# Patient Record
Sex: FEMALE | Race: OTHER | URBAN - METROPOLITAN AREA
[De-identification: names, ages, dates, MRNs, and addresses within clinical notes are randomized per-mention and may not be internally consistent; named-entity substitution may affect disease eponyms.]

---

## 2023-09-12 ENCOUNTER — APPOINTMENT (OUTPATIENT)
Dept: ULTRASOUND IMAGING | Age: 83
DRG: 981 | End: 2023-09-12
Attending: INTERNAL MEDICINE
Payer: MEDICARE

## 2023-09-12 ENCOUNTER — HOSPITAL ENCOUNTER (INPATIENT)
Age: 83
LOS: 17 days | Discharge: OTHER INSTITUTION | DRG: 981 | End: 2023-09-29
Attending: INTERNAL MEDICINE | Admitting: INTERNAL MEDICINE
Payer: MEDICARE

## 2023-09-12 DIAGNOSIS — B96.20 BACTEREMIA DUE TO ESCHERICHIA COLI: ICD-10-CM

## 2023-09-12 DIAGNOSIS — K80.00 CALCULUS OF GALLBLADDER WITH ACUTE CHOLECYSTITIS WITHOUT OBSTRUCTION: ICD-10-CM

## 2023-09-12 DIAGNOSIS — R10.11 RIGHT UPPER QUADRANT ABDOMINAL PAIN: ICD-10-CM

## 2023-09-12 DIAGNOSIS — R78.81 BACTEREMIA DUE TO ESCHERICHIA COLI: ICD-10-CM

## 2023-09-12 DIAGNOSIS — I49.9 CARDIAC ARRHYTHMIA, UNSPECIFIED CARDIAC ARRHYTHMIA TYPE: Primary | ICD-10-CM

## 2023-09-12 DIAGNOSIS — Z88.1 ALLERGY TO MULTIPLE ANTIBIOTICS: ICD-10-CM

## 2023-09-12 DIAGNOSIS — K63.1 PERFORATED BOWEL (HCC): ICD-10-CM

## 2023-09-12 PROBLEM — I10 BENIGN ESSENTIAL HTN: Status: ACTIVE | Noted: 2023-09-12

## 2023-09-12 PROBLEM — K52.9 ACUTE GASTROENTERITIS: Status: ACTIVE | Noted: 2023-09-12

## 2023-09-12 PROBLEM — K83.8 DILATION OF BILIARY TRACT: Status: ACTIVE | Noted: 2023-09-12

## 2023-09-12 PROBLEM — E78.2 MIXED HYPERLIPIDEMIA: Status: ACTIVE | Noted: 2023-09-12

## 2023-09-12 PROBLEM — R79.89 ABNORMAL LFTS: Status: ACTIVE | Noted: 2023-09-12

## 2023-09-12 PROBLEM — E11.9 DIABETES MELLITUS TYPE 2 IN NONOBESE (HCC): Status: ACTIVE | Noted: 2023-09-12

## 2023-09-12 PROBLEM — R63.4 WEIGHT LOSS: Status: ACTIVE | Noted: 2023-09-12

## 2023-09-12 PROBLEM — K80.50 CHOLEDOCHOLITHIASIS: Status: ACTIVE | Noted: 2023-09-12

## 2023-09-12 LAB
A1AT SERPL-MCNC: 136 MG/DL (ref 90–200)
ABSOLUTE BANDS #: 3.02 K/UL (ref 0–1)
AFP SERPL-MCNC: 2.1 UG/L
ALBUMIN SERPL-MCNC: 3.3 G/DL (ref 3.5–5.2)
ALBUMIN SERPL-MCNC: 3.3 G/DL (ref 3.5–5.2)
ALP SERPL-CCNC: 59 U/L (ref 35–104)
ALP SERPL-CCNC: 59 U/L (ref 35–104)
ALT SERPL-CCNC: 210 U/L (ref 5–33)
ALT SERPL-CCNC: 210 U/L (ref 5–33)
ANION GAP SERPL CALCULATED.3IONS-SCNC: 6 MMOL/L (ref 9–17)
AST SERPL-CCNC: 324 U/L
AST SERPL-CCNC: 324 U/L
BANDS: 19 % (ref 0–10)
BASOPHILS # BLD: 0 K/UL (ref 0–0.2)
BASOPHILS NFR BLD: 0 % (ref 0–2)
BILIRUB DIRECT SERPL-MCNC: 1.1 MG/DL
BILIRUB INDIRECT SERPL-MCNC: 0.2 MG/DL (ref 0–1)
BILIRUB SERPL-MCNC: 1.3 MG/DL (ref 0.3–1.2)
BILIRUB SERPL-MCNC: 1.3 MG/DL (ref 0.3–1.2)
BUN SERPL-MCNC: 23 MG/DL (ref 8–23)
CALCIUM SERPL-MCNC: 9.4 MG/DL (ref 8.6–10.4)
CERULOPLASMIN SERPL-MCNC: 19 MG/DL (ref 16–45)
CHLORIDE SERPL-SCNC: 103 MMOL/L (ref 98–107)
CK SERPL-CCNC: 145 U/L (ref 26–192)
CO2 SERPL-SCNC: 21 MMOL/L (ref 20–31)
CREAT SERPL-MCNC: 1 MG/DL (ref 0.5–0.9)
EOSINOPHIL # BLD: 0 K/UL (ref 0–0.4)
EOSINOPHILS RELATIVE PERCENT: 0 % (ref 0–4)
ERYTHROCYTE [DISTWIDTH] IN BLOOD BY AUTOMATED COUNT: 12.9 % (ref 11.5–14.9)
FERRITIN SERPL-MCNC: 238 NG/ML (ref 13–150)
FOLATE SERPL-MCNC: 17.5 NG/ML
GFR SERPL CREATININE-BSD FRML MDRD: 57 ML/MIN/1.73M2
GLUCOSE SERPL-MCNC: 172 MG/DL (ref 70–99)
HAV IGM SERPL QL IA: NONREACTIVE
HBV CORE IGM SERPL QL IA: NONREACTIVE
HBV SURFACE AG SERPL QL IA: NONREACTIVE
HCT VFR BLD AUTO: 27 % (ref 36–46)
HCV AB SERPL QL IA: NONREACTIVE
HGB BLD-MCNC: 8.6 G/DL (ref 12–16)
INR PPP: 1.1
IRON SATN MFR SERPL: 5 % (ref 20–55)
IRON SERPL-MCNC: 13 UG/DL (ref 37–145)
LYMPHOCYTES NFR BLD: 0.48 K/UL (ref 1–4.8)
LYMPHOCYTES RELATIVE PERCENT: 3 % (ref 24–44)
MCH RBC QN AUTO: 29 PG (ref 26–34)
MCHC RBC AUTO-ENTMCNC: 31.9 G/DL (ref 31–37)
MCV RBC AUTO: 90.9 FL (ref 80–100)
METAMYELOCYTES ABSOLUTE COUNT: 0.95 K/UL
METAMYELOCYTES: 6 %
MONOCYTES NFR BLD: 0.64 K/UL (ref 0.1–1.3)
MONOCYTES NFR BLD: 4 % (ref 1–7)
MORPHOLOGY: ABNORMAL
MORPHOLOGY: ABNORMAL
NEUTROPHILS NFR BLD: 68 % (ref 36–66)
NEUTS SEG NFR BLD: 10.81 K/UL (ref 1.3–9.1)
PLATELET # BLD AUTO: 179 K/UL (ref 150–450)
PMV BLD AUTO: 7.8 FL (ref 6–12)
POTASSIUM SERPL-SCNC: 5.3 MMOL/L (ref 3.7–5.3)
PROT SERPL-MCNC: 5.7 G/DL (ref 6.4–8.3)
PROT SERPL-MCNC: 5.7 G/DL (ref 6.4–8.3)
PROTHROMBIN TIME: 14.1 SEC (ref 11.8–14.6)
RBC # BLD AUTO: 2.97 M/UL (ref 4–5.2)
SODIUM SERPL-SCNC: 130 MMOL/L (ref 135–144)
TIBC SERPL-MCNC: 237 UG/DL (ref 250–450)
UNSATURATED IRON BINDING CAPACITY: 224 UG/DL (ref 112–347)
VIT B12 SERPL-MCNC: 850 PG/ML (ref 232–1245)
WBC OTHER # BLD: 15.9 K/UL (ref 3.5–11)

## 2023-09-12 PROCEDURE — 86225 DNA ANTIBODY NATIVE: CPT

## 2023-09-12 PROCEDURE — 86376 MICROSOMAL ANTIBODY EACH: CPT

## 2023-09-12 PROCEDURE — 2580000003 HC RX 258: Performed by: NURSE PRACTITIONER

## 2023-09-12 PROCEDURE — 86038 ANTINUCLEAR ANTIBODIES: CPT

## 2023-09-12 PROCEDURE — 82550 ASSAY OF CK (CPK): CPT

## 2023-09-12 PROCEDURE — 6360000002 HC RX W HCPCS: Performed by: INTERNAL MEDICINE

## 2023-09-12 PROCEDURE — 82103 ALPHA-1-ANTITRYPSIN TOTAL: CPT

## 2023-09-12 PROCEDURE — 85025 COMPLETE CBC W/AUTO DIFF WBC: CPT

## 2023-09-12 PROCEDURE — 83516 IMMUNOASSAY NONANTIBODY: CPT

## 2023-09-12 PROCEDURE — 99222 1ST HOSP IP/OBS MODERATE 55: CPT | Performed by: INTERNAL MEDICINE

## 2023-09-12 PROCEDURE — 82105 ALPHA-FETOPROTEIN SERUM: CPT

## 2023-09-12 PROCEDURE — 80074 ACUTE HEPATITIS PANEL: CPT

## 2023-09-12 PROCEDURE — 36415 COLL VENOUS BLD VENIPUNCTURE: CPT

## 2023-09-12 PROCEDURE — 76705 ECHO EXAM OF ABDOMEN: CPT

## 2023-09-12 PROCEDURE — 99223 1ST HOSP IP/OBS HIGH 75: CPT | Performed by: INTERNAL MEDICINE

## 2023-09-12 PROCEDURE — 82728 ASSAY OF FERRITIN: CPT

## 2023-09-12 PROCEDURE — 80053 COMPREHEN METABOLIC PANEL: CPT

## 2023-09-12 PROCEDURE — 83540 ASSAY OF IRON: CPT

## 2023-09-12 PROCEDURE — 2580000003 HC RX 258: Performed by: INTERNAL MEDICINE

## 2023-09-12 PROCEDURE — 82607 VITAMIN B-12: CPT

## 2023-09-12 PROCEDURE — 83550 IRON BINDING TEST: CPT

## 2023-09-12 PROCEDURE — 6360000002 HC RX W HCPCS: Performed by: NURSE PRACTITIONER

## 2023-09-12 PROCEDURE — 82746 ASSAY OF FOLIC ACID SERUM: CPT

## 2023-09-12 PROCEDURE — 2500000003 HC RX 250 WO HCPCS: Performed by: NURSE PRACTITIONER

## 2023-09-12 PROCEDURE — 82390 ASSAY OF CERULOPLASMIN: CPT

## 2023-09-12 PROCEDURE — 80076 HEPATIC FUNCTION PANEL: CPT

## 2023-09-12 PROCEDURE — 85610 PROTHROMBIN TIME: CPT

## 2023-09-12 PROCEDURE — 1200000000 HC SEMI PRIVATE

## 2023-09-12 PROCEDURE — C9113 INJ PANTOPRAZOLE SODIUM, VIA: HCPCS | Performed by: NURSE PRACTITIONER

## 2023-09-12 RX ORDER — LOSARTAN POTASSIUM 50 MG/1
50 TABLET ORAL DAILY
Status: ON HOLD | COMMUNITY
End: 2023-09-28 | Stop reason: HOSPADM

## 2023-09-12 RX ORDER — POLYETHYLENE GLYCOL 3350 17 G/17G
17 POWDER, FOR SOLUTION ORAL DAILY PRN
Status: DISCONTINUED | OUTPATIENT
Start: 2023-09-12 | End: 2023-09-29 | Stop reason: HOSPADM

## 2023-09-12 RX ORDER — PRAVASTATIN SODIUM 40 MG
40 TABLET ORAL DAILY
COMMUNITY

## 2023-09-12 RX ORDER — ONDANSETRON 4 MG/1
4 TABLET, ORALLY DISINTEGRATING ORAL EVERY 8 HOURS PRN
Status: DISCONTINUED | OUTPATIENT
Start: 2023-09-12 | End: 2023-09-29 | Stop reason: HOSPADM

## 2023-09-12 RX ORDER — GABAPENTIN 300 MG/1
300 CAPSULE ORAL 2 TIMES DAILY
Status: ON HOLD | COMMUNITY
End: 2023-09-29 | Stop reason: SDUPTHER

## 2023-09-12 RX ORDER — ACETAMINOPHEN 325 MG/1
650 TABLET ORAL EVERY 6 HOURS PRN
Status: DISCONTINUED | OUTPATIENT
Start: 2023-09-12 | End: 2023-09-29 | Stop reason: HOSPADM

## 2023-09-12 RX ORDER — ACETAMINOPHEN 650 MG/1
650 SUPPOSITORY RECTAL EVERY 6 HOURS PRN
Status: DISCONTINUED | OUTPATIENT
Start: 2023-09-12 | End: 2023-09-29 | Stop reason: HOSPADM

## 2023-09-12 RX ORDER — ENOXAPARIN SODIUM 100 MG/ML
40 INJECTION SUBCUTANEOUS DAILY
Status: DISCONTINUED | OUTPATIENT
Start: 2023-09-12 | End: 2023-09-29 | Stop reason: HOSPADM

## 2023-09-12 RX ORDER — METRONIDAZOLE 500 MG/100ML
500 INJECTION, SOLUTION INTRAVENOUS EVERY 8 HOURS
Status: DISCONTINUED | OUTPATIENT
Start: 2023-09-12 | End: 2023-09-22

## 2023-09-12 RX ORDER — DEXTROSE MONOHYDRATE, SODIUM CHLORIDE, AND POTASSIUM CHLORIDE 50; 1.49; 4.5 G/1000ML; G/1000ML; G/1000ML
INJECTION, SOLUTION INTRAVENOUS CONTINUOUS
Status: DISCONTINUED | OUTPATIENT
Start: 2023-09-12 | End: 2023-09-14

## 2023-09-12 RX ORDER — MAGNESIUM SULFATE 1 G/100ML
1000 INJECTION INTRAVENOUS PRN
Status: DISCONTINUED | OUTPATIENT
Start: 2023-09-12 | End: 2023-09-29 | Stop reason: HOSPADM

## 2023-09-12 RX ORDER — FAMOTIDINE 20 MG/1
20 TABLET, FILM COATED ORAL 2 TIMES DAILY
COMMUNITY

## 2023-09-12 RX ORDER — ONDANSETRON 2 MG/ML
4 INJECTION INTRAMUSCULAR; INTRAVENOUS EVERY 6 HOURS PRN
Status: DISCONTINUED | OUTPATIENT
Start: 2023-09-12 | End: 2023-09-29 | Stop reason: HOSPADM

## 2023-09-12 RX ORDER — SODIUM CHLORIDE 0.9 % (FLUSH) 0.9 %
10 SYRINGE (ML) INJECTION PRN
Status: DISCONTINUED | OUTPATIENT
Start: 2023-09-12 | End: 2023-09-29 | Stop reason: HOSPADM

## 2023-09-12 RX ORDER — MULTIVIT WITH MINERALS/LUTEIN
250 TABLET ORAL 2 TIMES DAILY
COMMUNITY

## 2023-09-12 RX ORDER — SODIUM CHLORIDE 0.9 % (FLUSH) 0.9 %
5-40 SYRINGE (ML) INJECTION EVERY 12 HOURS SCHEDULED
Status: DISCONTINUED | OUTPATIENT
Start: 2023-09-12 | End: 2023-09-29 | Stop reason: HOSPADM

## 2023-09-12 RX ORDER — POTASSIUM CHLORIDE 20 MEQ/1
40 TABLET, EXTENDED RELEASE ORAL PRN
Status: DISCONTINUED | OUTPATIENT
Start: 2023-09-12 | End: 2023-09-29 | Stop reason: HOSPADM

## 2023-09-12 RX ORDER — POTASSIUM CHLORIDE 7.45 MG/ML
10 INJECTION INTRAVENOUS PRN
Status: DISCONTINUED | OUTPATIENT
Start: 2023-09-12 | End: 2023-09-29 | Stop reason: HOSPADM

## 2023-09-12 RX ORDER — MELOXICAM 15 MG/1
15 TABLET ORAL DAILY
Status: ON HOLD | COMMUNITY
End: 2023-09-28 | Stop reason: HOSPADM

## 2023-09-12 RX ORDER — GLUCOSAMINE SULFATE 500 MG
500 CAPSULE ORAL 3 TIMES DAILY
COMMUNITY

## 2023-09-12 RX ORDER — SODIUM CHLORIDE 9 MG/ML
INJECTION, SOLUTION INTRAVENOUS PRN
Status: DISCONTINUED | OUTPATIENT
Start: 2023-09-12 | End: 2023-09-29 | Stop reason: HOSPADM

## 2023-09-12 RX ADMIN — CEFTRIAXONE SODIUM 1000 MG: 1 INJECTION, POWDER, FOR SOLUTION INTRAMUSCULAR; INTRAVENOUS at 11:17

## 2023-09-12 RX ADMIN — ENOXAPARIN SODIUM 40 MG: 100 INJECTION SUBCUTANEOUS at 13:24

## 2023-09-12 RX ADMIN — SODIUM CHLORIDE, PRESERVATIVE FREE 10 ML: 5 INJECTION INTRAVENOUS at 19:37

## 2023-09-12 RX ADMIN — POTASSIUM CHLORIDE, DEXTROSE MONOHYDRATE AND SODIUM CHLORIDE: 150; 5; 450 INJECTION, SOLUTION INTRAVENOUS at 11:14

## 2023-09-12 RX ADMIN — SODIUM CHLORIDE, PRESERVATIVE FREE 40 MG: 5 INJECTION INTRAVENOUS at 17:59

## 2023-09-12 RX ADMIN — METRONIDAZOLE 500 MG: 500 INJECTION, SOLUTION INTRAVENOUS at 22:59

## 2023-09-12 RX ADMIN — POTASSIUM CHLORIDE, DEXTROSE MONOHYDRATE AND SODIUM CHLORIDE: 150; 5; 450 INJECTION, SOLUTION INTRAVENOUS at 06:26

## 2023-09-12 RX ADMIN — METRONIDAZOLE 500 MG: 500 INJECTION, SOLUTION INTRAVENOUS at 13:24

## 2023-09-12 NOTE — PLAN OF CARE
Problem: Discharge Planning  Goal: Discharge to home or other facility with appropriate resources  Outcome: Progressing  Flowsheets (Taken 9/12/2023 0906)  Discharge to home or other facility with appropriate resources:   Identify barriers to discharge with patient and caregiver   Arrange for needed discharge resources and transportation as appropriate   Identify discharge learning needs (meds, wound care, etc)   Refer to discharge planning if patient needs post-hospital services based on physician order or complex needs related to functional status, cognitive ability or social support system     Problem: Safety - Adult  Goal: Free from fall injury  Outcome: Progressing     Problem: Pain  Goal: Verbalizes/displays adequate comfort level or baseline comfort level  Outcome: Progressing

## 2023-09-12 NOTE — PROGRESS NOTES
Pt direct admitted from [address] [place] [address]. Assessment as charted. No distress noted. Will continue to monitor.

## 2023-09-12 NOTE — CONSULTS
PROTIME 14.1   INR 1.1       BMP  Recent Labs     09/12/23  0852   *   K 5.3      CO2 21   BUN 23   CREATININE 1.0*   GLUCOSE 172*   CALCIUM 9.4       LIVER WORK UP  Hepatitis Functional Panel:  Recent Labs     09/12/23  0852   ALKPHOS 59  59   *  210*   *  324*   PROT 5.7*  5.7*   BILITOT 1.3*  1.3*   BILIDIR 1.1*   LABALBU 3.3*  3.3*       AMYLASE/LIPASE/AMMONIA  No results for input(s): \"AMYLASE\", \"LIPASE\", \"AMMONIA\" in the last 72 hours. Acute Hepatitis Panel   No results found for: \"HEPBSAG\", \"HEPCAB\", \"HEPBIGM\", \"HEPAIGM\"    HCV Genotype   No results found for: \"HEPATITISCGENOTYPE\"    HCV Quantitative   No results found for: \"HCVQNT\"    Metabolic work up:  Ceruloplasmin  AA work up:  Alpha 1 antitrypsin   No results found for: \"A1A\"  AMA:  No results found for: \"MITOAB\"    ASMA:  No results found for: \"SMOOTHMUSCAB\"    ANCA:    COTY:  No results found for: \"COTY\"    Anti - Liver/Kidney Ab:  No results found for: \"LIVER-KIDNEYMICROSOMALAB\"    Ceruloplasmin:  No results found for: \"CERULOPLSM\"    Celiac panel:  No results found for: \"TISSTRNTIIGG\", \"TTGIGA\", \"IGA\"    Cancer Markers:  CEA:    No results for input(s): \"CEA\" in the last 72 hours. Ca 125:   No results for input(s): \"\" in the last 72 hours. Ca 19-9:     Invalid input(s): \"\"  AFP: No results for input(s): \"AFP\" in the last 72 hours. ASSESSMENT and PLAN:   Joce Garland is a 82 y.o.  female  with PMH of DM, breast cancer s/p surgery, GERD, HTN who presented to [address] [place] [address] with abdominal pain. CT w/ biliary and PD dilation  and abnormal LFTs.     # Abdominal pain (epigastric / Right upper quadrant, lipase normal)  # Abnormal CT Abdomen w/ biliary and PD dilation  # Abnormal LFTs with AST >300, ALT >100 (normal TB and Alk)    Plan:  - Obtain MRCP / MRI  - Obtain US gallbladder  - Trend daily LFTs  - Antibiotics  - Check CK level  - Obtain workup for liver disease        Thank you for allowing

## 2023-09-12 NOTE — PLAN OF CARE
Patient is being bumped for an ICU patient-triple study. Pt may eat after her ultrasound if dr allows. Please keep NPO after midnight and patient will be scanned at 9am on 9/13/2023. Any questions, please call MRI at [phone]. Thank you!

## 2023-09-12 NOTE — CARE COORDINATION
Case Management Assessment  Initial Evaluation    Date/Time of Evaluation: 9/12/2023 12:20 PM  Assessment Completed by: Ana Stephenson RN    If patient is discharged prior to next notation, then this note serves as note for discharge by case management. Patient Name: Collins Pak                   YOB: 1940  Diagnosis: Choledocholithiasis [K80.50]                   Date / Time: 9/12/2023  3:31 AM    Patient Admission Status: Inpatient   Readmission Risk (Low < 19, Mod (19-27), High > 27): No data recorded  Current PCP: No primary care provider on file. PCP verified by CM? Yes    Chart Reviewed: Yes      History Provided by: Patient  Patient Orientation: Alert and Oriented    Patient Cognition: Alert    Hospitalization in the last 30 days (Readmission):  No    If yes, Readmission Assessment in CM Navigator will be completed. Advance Directives:      Code Status: Full Code   Patient's Primary Decision Maker is: Legal Next of Kin    Primary Decision Maker: Jin,Mahesh [facility] - [phone]    Discharge Planning:    Patient lives with: Alone Type of Home: House  Primary Care Giver: Self  Patient Support Systems include: Children, Family Members, Friends/Neighbors   Current Financial resources: Medicare  Current community resources: None  Current services prior to admission: Durable Medical Equipment            Current DME: Cane            Type of Home Care services:  OT, PT, Nursing Services    ADLS  Prior functional level: Independent in ADLs/IADLs  Current functional level: Independent in ADLs/IADLs    PT AM-PAC:   /24  OT AM-PAC:   /24    Family can provide assistance at DC: No (family live out of [place]. friend, [facility], helps)  Would you like Case Management to discuss the discharge plan with any other family members/significant others, and if so, who?  No  Plans to Return to Present Housing: Yes  Other Identified Issues/Barriers to RETURNING to current housing: No  Potential not observed

## 2023-09-12 NOTE — ACP (ADVANCE CARE PLANNING)
Advance Care Planning     Advance Care Planning Activator (Inpatient)  Conversation Note      Date of ACP Conversation: 9/12/2023     Conversation Conducted with: Patient with Decision Making Capacity    ACP Activator: Nima Rivera RN      Health Care Decision Maker:     Current Designated Health Care Decision Maker:     Primary Decision Maker: Olman Joy - [phone]  Click here to complete Healthcare Decision Makers including section of the Healthcare Decision Maker Relationship (ie \"Primary\")  Today we documented Decision Maker(s). The patient will provide ACP documents. Care Preferences    Ventilation: \"If you were in your present state of health and suddenly became very ill and were unable to breathe on your own, what would your preference be about the use of a ventilator (breathing machine) if it were available to you? \"      Would the patient desire the use of ventilator (breathing machine)?: yes    \"If your health worsens and it becomes clear that your chance of recovery is unlikely, what would your preference be about the use of a ventilator (breathing machine) if it were available to you? \"     Would the patient desire the use of ventilator (breathing machine)?: No      Resuscitation  \"CPR works best to restart the heart when there is a sudden event, like a heart attack, in someone who is otherwise healthy. Unfortunately, CPR does not typically restart the heart for people who have serious health conditions or who are very sick. \"    \"In the event your heart stopped as a result of an underlying serious health condition, would you want attempts to be made to restart your heart (answer \"yes\" for attempt to resuscitate) or would you prefer a natural death (answer \"no\" for do not attempt to resuscitate)? \" yes       [] Yes   [] No   Educated Patient / Hastings Crofts regarding differences between Advance Directives and portable DNR orders.     Length of ACP Conversation in minutes:

## 2023-09-12 NOTE — PLAN OF CARE
Please have patient or POA answer all MRI Screening questions in Epic. Exam will be scheduled after form has been completed and reviewed. Pt must be NPO for approximately 6 hours. Thank you!

## 2023-09-12 NOTE — PROGRESS NOTES
09/12/23 1449   Encounter Summary   Encounter Overview/Reason  Advance Care Planning   Service Provided For: Patient   Referral/Consult From: Patient   Support System Children;Friends/neighbors   Last Encounter  09/12/23   Complexity of Encounter Moderate   Begin Time 1325   End Time  1415   Total Time Calculated 50 min   Advance Care Planning   Type Completed AD/ACP document(s);ACP conversation   Assessment/Intervention/Outcome   Assessment Calm;Coping;Peaceful   Intervention Active listening;Prayer (assurance of)/Sanderson;Sustaining Presence/Ministry of presence   Outcome Peace; Receptive;Engaged in conversation

## 2023-09-12 NOTE — ACP (ADVANCE CARE PLANNING)
Advance Care Planning     Advance Care Planning Inpatient Note  [facility] Department    Today's Date: 9/12/2023  Unit: [address] MED SURG    Received request from patient. Upon review of chart and communication with care team, patient's decision making abilities are not in question. . Patient was/were present in the room during visit. Goals of ACP Conversation:  Discuss advance care planning documents  Facilitate a discussion related to patient's goals of care as they align with the patient's values and beliefs. Completing the documents. Health Care Decision Makers:       Primary Decision Maker: Giovana Vintondale - Child - [phone]    Secondary Decision Maker: Edu Palomo - Child - [phone]    Supplemental (Other) Decision Maker: Pardeepkorygraciela Newton - [phone]  Summary:  Completed New Documents  The primary decision maker, Mariela Beasley, PT's son, iis a medical doctor in [place], [place]. Advance Care Planning Documents (Patient Wishes):  Healthcare Power of /Advance Directive Appointment of [address]   No Living Will. Assessment:  PT wants to be a FULL code and stay as much as possible, but she wants to leave all the decision to her son who is a doctor. She was very emotional as today is when her  passed away 12 (16) years ago. Interventions:  Provided education on documents for clarity and greater understanding  Discussed and provided education on state decision maker hierarchy  Assisted in the completion of documents according to patient's wishes at this time  Encouraged ongoing ACP conversation with future decision makers and loved ones    Care Preferences Communicated:     Hospitalization:  If the patient's health worsens and it becomes clear that the chance of recovery is unlikely,     the patient wants hospitalization.     Ventilation:   If the patient, in their present state of health, suddenly became very ill and unable to breathe on their own,     the

## 2023-09-12 NOTE — CONSULTS
General Surgery Consult      Pt Name: Damaso Brenner  MRN: [id number]  YOB: 1940  Date of evaluation: 9/12/2023  Primary Care Physician: No primary care provider on file. Patient evaluated at the request of  HERBIE Velasquez PA-C  Reason for evaluation: Choledocholithiasis, abdominal pain    SUBJECTIVE:   History of Chief Complaint:    Damaso Brenner is a 82 y.o. female who presents with abdominal pain. Patient was transferred from [facility] earlier today. Patient is seen and examined at bedside this afternoon. Patient states that abdominal pain started last Friday. Complaining of pain to right upper quadrant of abdomen. Patient does complain of nausea without vomiting. States that her son is a physician who recommended that she follow a fat-free diet, however pain continued. She does complain of chills, also states she had fever. Review of CT abdomen pelvis completed yesterday at [facility] emergency department reveals biliary duct dilatation with the common bile duct measuring 1.4 cm. Additional findings noted. Pertinent lab work reviewed from the emergency department at [facility] yesterday reveals potassium of 5.3,  , alkaline phosphatase 66, white blood cell count 12.2, hemoglobin 10.1. Positive for nitrates, leukocytes, bacteria per UA. Patient with significant medical history for diabetes and hypertension, as well as breast cancer. Prior abdominal/pelvic surgeries include hysterectomy. Patient states that she was taking aspirin every other day. Past Medical History   has a past medical history of Arthritis, Cancer ([address]), Chronic back pain, Diabetes mellitus ([address]), DM (diabetes mellitus) ([address]), GERD (gastroesophageal reflux disease), Hypertension, Spinal stenosis, and Spondylolysis.     Past Surgical History   has a past surgical history that includes back surgery; Breast surgery; joint replacement; and Tonsillectomy.     Medications    Current Facility-Administered Medications:     dextrose 5 % and 0.45 % NaCl with KCl 20 mEq infusion, , IntraVENous, Continuous, GRACE Arauz CNP, Last Rate: 125 mL/hr at 09/12/23 1114, New Bag at 09/12/23 1114    sodium chloride flush 0.9 % injection 5-40 mL, 5-40 mL, IntraVENous, 2 times per day, GRACE Arauz CNP    sodium chloride flush 0.9 % injection 10 mL, 10 mL, IntraVENous, PRN, GRACE Arauz - CNP    0.9 % sodium chloride infusion, , IntraVENous, PRN, GRACE Arauz CNP    potassium chloride (KLOR-CON M) extended release tablet 40 mEq, 40 mEq, Oral, PRN **OR** potassium bicarb-citric acid (EFFER-K) effervescent tablet 40 mEq, 40 mEq, Oral, PRN **OR** potassium chloride 10 mEq/100 mL IVPB (Peripheral Line), 10 mEq, IntraVENous, PRN, GRACE Arauz - CNP    magnesium sulfate 1000 mg in dextrose 5% 100 mL IVPB, 1,000 mg, IntraVENous, PRN, GRACE Arauz - CNP    enoxaparin (LOVENOX) injection 40 mg, 40 mg, SubCUTAneous, Daily, GRACE Arauz CNP, 40 mg at 09/12/23 1324    ondansetron (ZOFRAN-ODT) disintegrating tablet 4 mg, 4 mg, Oral, Q8H PRN **OR** ondansetron (ZOFRAN) injection 4 mg, 4 mg, IntraVENous, Q6H PRN, GRACE Arauz - CNP    acetaminophen (TYLENOL) tablet 650 mg, 650 mg, Oral, Q6H PRN **OR** acetaminophen (TYLENOL) suppository 650 mg, 650 mg, Rectal, Q6H PRN, GRACE Arauz CNP    polyethylene glycol (GLYCOLAX) packet 17 g, 17 g, Oral, Daily PRN, GRACE Arauz CNP    cefTRIAXone (ROCEPHIN) 1,000 mg in sodium chloride 0.9 % 50 mL IVPB (mini-bag), 1,000 mg, IntraVENous, Q24H, Radha Langston MD, Stopped at 09/12/23 1157    metronidazole (FLAGYL) 500 mg in 0.9% NaCl 100 mL IVPB premix, 500 mg, IntraVENous, Q8H, Radha Langston MD, Stopped at 09/12/23 1441    pantoprazole (PROTONIX) 40 mg in sodium chloride (PF) 0.9 % 10 mL injection, 40 mg,

## 2023-09-12 NOTE — H&P
[address] [facility] Internal Medicine  oYssi Wright MD; Jody Avery MD; Delvin Cleaning MD; MD Alessandra Kapoor MD; Julio Cesar Fabian MD    [facility] Internal Medicine   [address] [place] [address] [address]     HISTORY AND PHYSICAL EXAMINATION            Date:   9/12/2023  Patient name:  Johnny Breaux  Date of admission:  9/12/2023  3:31 AM  MRN:   [id number]  Account:  [de-identified]  YOB: 1940  PCP:    No primary care provider on file. Room:   204204301  Code Status:    Full Code    Chief Complaint:     No chief complaint on file. Abdominal pain    History Obtained From:     patient    History of Present Illness:     Johnny Breaux is a 82 y.o.  female who presents with No chief complaint on file. and is admitted to the hospital for the management of Choledocholithiasis. Past Medical History:     Past Medical History:   Diagnosis Date    Arthritis     Cancer ([facility])     Chronic back pain     Diabetes mellitus ([address] [place] [address])     DM (diabetes mellitus) ([facility])     GERD (gastroesophageal reflux disease)     Hypertension     Spinal stenosis     Spondylolysis         Past Surgical History:     Past Surgical History:   Procedure Laterality Date    BACK SURGERY      BREAST SURGERY      JOINT REPLACEMENT      TONSILLECTOMY          Medications Prior to Admission:     Prior to Admission medications    Medication Sig Start Date End Date Taking?  Authorizing Provider   Ascorbic Acid (VITAMIN C) 250 MG tablet Take 1 tablet by mouth in the morning and at bedtime   Yes Historical Provider, MD   ASPIRIN 81 PO Take 81 mg by mouth daily   Yes Historical Provider, MD   vitamin D (CHOLECALCIFEROL) 25 MCG (1000 UT) TABS tablet Take 1 tablet by mouth daily   Yes Historical Provider, MD   cyanocobalamin 1000 MCG tablet Take 1 tablet by mouth daily   Yes Historical Provider, MD   famotidine (PEPCID) 20 MG tablet Take 1 tablet by mouth 2 times daily   Yes Historical Provider, MD over  Neurologic: There are no new focal motor or sensory deficits, normal muscle tone and bulk, no abnormal sensation, normal speech, cranial nerves II through XII grossly intact  Skin: No gross lesions, rashes, bruising or bleeding on exposed skin area  Extremities: peripheral pulses palpable, no pedal edema or calf pain with palpation  Psych: normal affect    Investigations:      Laboratory Testing:  Recent Results (from the past 24 hour(s))   Hepatic Function Panel    Collection Time: 09/12/23  8:52 AM   Result Value Ref Range    Albumin 3.3 (L) 3.5 - 5.2 g/dL    Alkaline Phosphatase 59 35 - 104 U/L     (H) 5 - 33 U/L     (H) <32 U/L    Total Bilirubin 1.3 (H) 0.3 - 1.2 mg/dL    Bilirubin, Direct 1.1 (H) <0.3 mg/dL    Bilirubin, Indirect 0.2 0.0 - 1.0 mg/dL    Total Protein 5.7 (L) 6.4 - 8.3 g/dL   CBC with Auto Differential    Collection Time: 09/12/23  8:52 AM   Result Value Ref Range    WBC 15.9 (H) 3.5 - 11.0 k/uL    RBC 2.97 (L) 4.0 - 5.2 m/uL    Hemoglobin 8.6 (L) 12.0 - 16.0 g/dL    Hematocrit 27.0 (L) 36 - 46 %    MCV 90.9 80 - 100 fL    MCH 29.0 26 - 34 pg    MCHC 31.9 31 - 37 g/dL    RDW 12.9 11.5 - 14.9 %    Platelets 772 557 - 314 k/uL    MPV 7.8 6.0 - 12.0 fL    Neutrophils % PENDING %    Lymphocytes % PENDING %    Monocytes % PENDING %    Eosinophils % PENDING %    Basophils % PENDING %    Neutrophils Absolute PENDING k/uL    Lymphocytes Absolute PENDING k/uL    Monocytes Absolute PENDING k/uL    Eosinophils Absolute PENDING k/uL    Basophils Absolute PENDING 0.0 - 0.2 k/uL   Protime-INR    Collection Time: 09/12/23  8:52 AM   Result Value Ref Range    Protime 14.1 11.8 - 14.6 sec    INR 1.1    Comprehensive Metabolic Panel w/ Reflex to MG    Collection Time: 09/12/23  8:52 AM   Result Value Ref Range    Sodium PENDING mmol/L    Potassium PENDING mmol/L    Chloride PENDING mmol/L    CO2 PENDING mmol/L    Anion Gap PENDING mmol/L    Glucose PENDING mg/dL    BUN PENDING mg/dL

## 2023-09-13 ENCOUNTER — APPOINTMENT (OUTPATIENT)
Dept: MRI IMAGING | Age: 83
DRG: 981 | End: 2023-09-13
Attending: INTERNAL MEDICINE
Payer: MEDICARE

## 2023-09-13 PROBLEM — B96.20 BACTEREMIA, ESCHERICHIA COLI: Status: ACTIVE | Noted: 2023-09-13

## 2023-09-13 PROBLEM — R78.81 BACTEREMIA, ESCHERICHIA COLI: Status: ACTIVE | Noted: 2023-09-13

## 2023-09-13 LAB
ABSOLUTE BANDS #: 2.09 K/UL (ref 0–1)
ALBUMIN SERPL-MCNC: 3.2 G/DL (ref 3.5–5.2)
ALP SERPL-CCNC: 66 U/L (ref 35–104)
ALT SERPL-CCNC: 138 U/L (ref 5–33)
ANION GAP SERPL CALCULATED.3IONS-SCNC: 7 MMOL/L (ref 9–17)
AST SERPL-CCNC: 113 U/L
BANDS: 14 % (ref 0–10)
BASOPHILS # BLD: 0 K/UL (ref 0–0.2)
BASOPHILS NFR BLD: 0 % (ref 0–2)
BILIRUB SERPL-MCNC: 1.1 MG/DL (ref 0.3–1.2)
BUN SERPL-MCNC: 14 MG/DL (ref 8–23)
CALCIUM SERPL-MCNC: 9.5 MG/DL (ref 8.6–10.4)
CHLORIDE SERPL-SCNC: 104 MMOL/L (ref 98–107)
CO2 SERPL-SCNC: 20 MMOL/L (ref 20–31)
CREAT SERPL-MCNC: 0.8 MG/DL (ref 0.5–0.9)
EOSINOPHIL # BLD: 0.15 K/UL (ref 0–0.4)
EOSINOPHILS RELATIVE PERCENT: 1 % (ref 0–4)
ERYTHROCYTE [DISTWIDTH] IN BLOOD BY AUTOMATED COUNT: 12.8 % (ref 11.5–14.9)
GFR SERPL CREATININE-BSD FRML MDRD: >60 ML/MIN/1.73M2
GLUCOSE SERPL-MCNC: 162 MG/DL (ref 70–99)
HCT VFR BLD AUTO: 26.5 % (ref 36–46)
HGB BLD-MCNC: 8.5 G/DL (ref 12–16)
LIPASE SERPL-CCNC: 20 U/L (ref 13–60)
LYMPHOCYTES NFR BLD: 0.6 K/UL (ref 1–4.8)
LYMPHOCYTES RELATIVE PERCENT: 4 % (ref 24–44)
MAGNESIUM SERPL-MCNC: 2 MG/DL (ref 1.6–2.6)
MCH RBC QN AUTO: 29.3 PG (ref 26–34)
MCHC RBC AUTO-ENTMCNC: 32.2 G/DL (ref 31–37)
MCV RBC AUTO: 90.9 FL (ref 80–100)
MONOCYTES NFR BLD: 0.3 K/UL (ref 0.1–1.3)
MONOCYTES NFR BLD: 2 % (ref 1–7)
MORPHOLOGY: ABNORMAL
MORPHOLOGY: ABNORMAL
NEUTROPHILS NFR BLD: 79 % (ref 36–66)
NEUTS SEG NFR BLD: 11.76 K/UL (ref 1.3–9.1)
PHOSPHATE SERPL-MCNC: 2.2 MG/DL (ref 2.6–4.5)
PLATELET # BLD AUTO: 170 K/UL (ref 150–450)
PMV BLD AUTO: 8.2 FL (ref 6–12)
POTASSIUM SERPL-SCNC: 4.9 MMOL/L (ref 3.7–5.3)
PROT SERPL-MCNC: 5.9 G/DL (ref 6.4–8.3)
RBC # BLD AUTO: 2.92 M/UL (ref 4–5.2)
SODIUM SERPL-SCNC: 131 MMOL/L (ref 135–144)
WBC OTHER # BLD: 14.9 K/UL (ref 3.5–11)

## 2023-09-13 PROCEDURE — C9113 INJ PANTOPRAZOLE SODIUM, VIA: HCPCS | Performed by: NURSE PRACTITIONER

## 2023-09-13 PROCEDURE — APPSS30 APP SPLIT SHARED TIME 16-30 MINUTES: Performed by: NURSE PRACTITIONER

## 2023-09-13 PROCEDURE — 85025 COMPLETE CBC W/AUTO DIFF WBC: CPT

## 2023-09-13 PROCEDURE — 2580000003 HC RX 258: Performed by: NURSE PRACTITIONER

## 2023-09-13 PROCEDURE — 84100 ASSAY OF PHOSPHORUS: CPT

## 2023-09-13 PROCEDURE — 6360000002 HC RX W HCPCS: Performed by: NURSE PRACTITIONER

## 2023-09-13 PROCEDURE — 2500000003 HC RX 250 WO HCPCS: Performed by: NURSE PRACTITIONER

## 2023-09-13 PROCEDURE — 99232 SBSQ HOSP IP/OBS MODERATE 35: CPT | Performed by: INTERNAL MEDICINE

## 2023-09-13 PROCEDURE — 2580000003 HC RX 258: Performed by: INTERNAL MEDICINE

## 2023-09-13 PROCEDURE — A4216 STERILE WATER/SALINE, 10 ML: HCPCS | Performed by: NURSE PRACTITIONER

## 2023-09-13 PROCEDURE — 36415 COLL VENOUS BLD VENIPUNCTURE: CPT

## 2023-09-13 PROCEDURE — 83690 ASSAY OF LIPASE: CPT

## 2023-09-13 PROCEDURE — 83735 ASSAY OF MAGNESIUM: CPT

## 2023-09-13 PROCEDURE — 82977 ASSAY OF GGT: CPT

## 2023-09-13 PROCEDURE — 6360000002 HC RX W HCPCS: Performed by: INTERNAL MEDICINE

## 2023-09-13 PROCEDURE — 80053 COMPREHEN METABOLIC PANEL: CPT

## 2023-09-13 PROCEDURE — 1200000000 HC SEMI PRIVATE

## 2023-09-13 PROCEDURE — 76377 3D RENDER W/INTRP POSTPROCES: CPT

## 2023-09-13 RX ADMIN — METRONIDAZOLE 500 MG: 500 INJECTION, SOLUTION INTRAVENOUS at 06:09

## 2023-09-13 RX ADMIN — METRONIDAZOLE 500 MG: 500 INJECTION, SOLUTION INTRAVENOUS at 10:44

## 2023-09-13 RX ADMIN — POTASSIUM CHLORIDE, DEXTROSE MONOHYDRATE AND SODIUM CHLORIDE: 150; 5; 450 INJECTION, SOLUTION INTRAVENOUS at 13:53

## 2023-09-13 RX ADMIN — METRONIDAZOLE 500 MG: 500 INJECTION, SOLUTION INTRAVENOUS at 18:00

## 2023-09-13 RX ADMIN — ONDANSETRON 4 MG: 2 INJECTION INTRAMUSCULAR; INTRAVENOUS at 21:45

## 2023-09-13 RX ADMIN — SODIUM CHLORIDE, PRESERVATIVE FREE 40 MG: 5 INJECTION INTRAVENOUS at 10:14

## 2023-09-13 RX ADMIN — POTASSIUM CHLORIDE, DEXTROSE MONOHYDRATE AND SODIUM CHLORIDE: 150; 5; 450 INJECTION, SOLUTION INTRAVENOUS at 02:02

## 2023-09-13 RX ADMIN — ENOXAPARIN SODIUM 40 MG: 100 INJECTION SUBCUTANEOUS at 10:14

## 2023-09-13 RX ADMIN — CEFTRIAXONE SODIUM 1000 MG: 1 INJECTION, POWDER, FOR SOLUTION INTRAMUSCULAR; INTRAVENOUS at 10:11

## 2023-09-13 NOTE — CARE COORDINATION
ONGOING DISCHARGE PLAN:    Patient is alert and oriented x4. Spoke with patient regarding discharge plan and patient confirms that plan is still home with [facility]. MRCP today. Active order for IV Rocephin and IV Flagyl. Will continue to follow for additional discharge needs. If patient is discharged prior to next notation, then this note serves as note for discharge by case management.     Electronically signed by Myriam Ochoa RN on 9/13/2023 at 11:18 AM

## 2023-09-13 NOTE — PROGRESS NOTES
[address] [facility] Internal Medicine  China Aiken MD; Neha Goldberg MD; Ruba Lira MD; MD Rosenda Peterson MD; Lorena Oliver MD    [facility] Internal Medicine   [address]    Progress note            Date:   9/13/2023  Patient name:  Rajeev Cook  Date of admission:  9/12/2023  3:31 AM  MRN:   [id number]  Account:  [de-identified]  YOB: 1940  PCP:    No primary care provider on file. Room:   204204301  Code Status:    Full Code    Chief Complaint:     No chief complaint on file. Abdominal pain    History Obtained From:     patient    History of Present Illness:     Rajeev Cook is a 82 y.o. Unavailable / unknown female who presents with No chief complaint on file. and is admitted to the hospital for the management of Choledocholithiasis. Past Medical History:     Past Medical History:   Diagnosis Date    Arthritis     Cancer ([facility])     Chronic back pain     Diabetes mellitus ([address] [place] [address])     DM (diabetes mellitus) ([address] [place] [address])     GERD (gastroesophageal reflux disease)     Hypertension     Spinal stenosis     Spondylolysis         Past Surgical History:     Past Surgical History:   Procedure Laterality Date    BACK SURGERY      BREAST SURGERY      JOINT REPLACEMENT      TONSILLECTOMY          Medications Prior to Admission:     Prior to Admission medications    Medication Sig Start Date End Date Taking?  Authorizing Provider   Ascorbic Acid (VITAMIN C) 250 MG tablet Take 1 tablet by mouth in the morning and at bedtime   Yes Historical Provider, MD   ASPIRIN 81 PO Take 81 mg by mouth daily   Yes Historical Provider, MD   vitamin D (CHOLECALCIFEROL) 25 MCG (1000 UT) TABS tablet Take 1 tablet by mouth daily   Yes Historical Provider, MD   cyanocobalamin 1000 MCG tablet Take 1 tablet by mouth daily   Yes Historical Provider, MD   famotidine (PEPCID) 20 MG tablet Take 1 tablet by mouth 2 times daily   Yes Historical Provider,

## 2023-09-13 NOTE — PLAN OF CARE
Problem: Discharge Planning  Goal: Discharge to home or other facility with appropriate resources  9/13/2023 1815 by Dulce Renee RN  Outcome: Progressing  9/13/2023 0638 by Boy De La Paz RN  Outcome: Progressing     Problem: Safety - Adult  Goal: Free from fall injury  9/13/2023 1815 by Dulce Renee RN  Outcome: Progressing  9/13/2023 0638 by Boy De La Paz RN  Outcome: Progressing     Problem: Pain  Goal: Verbalizes/displays adequate comfort level or baseline comfort level  9/13/2023 1815 by Dulce Renee RN  Outcome: Progressing  9/13/2023 0638 by Boy De La Paz RN  Outcome: Progressing

## 2023-09-13 NOTE — PROGRESS NOTES
Patient was seen and examined. Resting comfortably. Afebrile vital signs are stable. N.p.o. for MRCP. Urine output is adequate. Abdomen is soft nondistended nothing acute. Extremity nontender. Blood work reviewed. Sodium is improved. Potassium creatinine is normal.  Liver function test shows normal alkaline phosphatase and total bilirubin. ALT has improved AST has improved as well. Liver function test is improved significantly. WBC count is slightly improved. Hemoglobin 8.5. Stable. Patient has positive blood cultures. She has UTI. MRCP pending. Depending on the results I will make further recommendations. Gallbladder ultrasound shows sludge. Borderline dilated common bile duct. Small right pleural effusion. If MRCP is negative then patient needs to be treated for UTI first and recover medically. Patient eventually will need elective robotic cholecystectomy once cleared. Discussed with charge nurse.

## 2023-09-14 ENCOUNTER — APPOINTMENT (OUTPATIENT)
Age: 83
DRG: 981 | End: 2023-09-14
Attending: INTERNAL MEDICINE
Payer: MEDICARE

## 2023-09-14 ENCOUNTER — ANESTHESIA EVENT (OUTPATIENT)
Dept: OPERATING ROOM | Age: 83
End: 2023-09-14
Payer: MEDICARE

## 2023-09-14 ENCOUNTER — APPOINTMENT (OUTPATIENT)
Dept: GENERAL RADIOLOGY | Age: 83
DRG: 981 | End: 2023-09-14
Attending: INTERNAL MEDICINE
Payer: MEDICARE

## 2023-09-14 LAB
ALBUMIN SERPL-MCNC: 3.2 G/DL (ref 3.5–5.2)
ALP SERPL-CCNC: 72 U/L (ref 35–104)
ALT SERPL-CCNC: 90 U/L (ref 5–33)
ANION GAP SERPL CALCULATED.3IONS-SCNC: 7 MMOL/L (ref 9–17)
AST SERPL-CCNC: 44 U/L
BASOPHILS # BLD: 0 K/UL (ref 0–0.2)
BASOPHILS NFR BLD: 0 % (ref 0–2)
BILIRUB SERPL-MCNC: 0.4 MG/DL (ref 0.3–1.2)
BNP SERPL-MCNC: 5509 PG/ML
BUN SERPL-MCNC: 12 MG/DL (ref 8–23)
CALCIUM SERPL-MCNC: 9.7 MG/DL (ref 8.6–10.4)
CHLORIDE SERPL-SCNC: 104 MMOL/L (ref 98–107)
CO2 SERPL-SCNC: 21 MMOL/L (ref 20–31)
CREAT SERPL-MCNC: 0.8 MG/DL (ref 0.5–0.9)
ECHO AO ROOT DIAM: 2.8 CM
ECHO AO ROOT INDEX: 1.78 CM/M2
ECHO AV AREA PEAK VELOCITY: 2.1 CM2
ECHO AV AREA VTI: 2.3 CM2
ECHO AV AREA/BSA PEAK VELOCITY: 1.3 CM2/M2
ECHO AV AREA/BSA VTI: 1.5 CM2/M2
ECHO AV MEAN GRADIENT: 5 MMHG
ECHO AV MEAN VELOCITY: 1 M/S
ECHO AV PEAK GRADIENT: 10 MMHG
ECHO AV PEAK VELOCITY: 1.6 M/S
ECHO AV VELOCITY RATIO: 0.69
ECHO AV VTI: 30.1 CM
ECHO BSA: 1.6 M2
ECHO EST RA PRESSURE: 3 MMHG
ECHO LA AREA 2C: 20.1 CM2
ECHO LA AREA 4C: 14.6 CM2
ECHO LA DIAMETER INDEX: 2.55 CM/M2
ECHO LA DIAMETER: 4 CM
ECHO LA MAJOR AXIS: 5.1 CM
ECHO LA MINOR AXIS: 5.3 CM
ECHO LA TO AORTIC ROOT RATIO: 1.43
ECHO LA VOL 2C: 62 ML (ref 22–52)
ECHO LA VOL 4C: 34 ML (ref 22–52)
ECHO LA VOL BP: 47 ML (ref 22–52)
ECHO LA VOL/BSA BIPLANE: 30 ML/M2 (ref 16–34)
ECHO LA VOLUME INDEX A2C: 39 ML/M2 (ref 16–34)
ECHO LA VOLUME INDEX A4C: 22 ML/M2 (ref 16–34)
ECHO LV E' LATERAL VELOCITY: 7 CM/S
ECHO LV E' SEPTAL VELOCITY: 5 CM/S
ECHO LV FRACTIONAL SHORTENING: 36 % (ref 28–44)
ECHO LV INTERNAL DIMENSION DIASTOLE INDEX: 2.68 CM/M2
ECHO LV INTERNAL DIMENSION DIASTOLIC: 4.2 CM (ref 3.9–5.3)
ECHO LV INTERNAL DIMENSION SYSTOLIC INDEX: 1.72 CM/M2
ECHO LV INTERNAL DIMENSION SYSTOLIC: 2.7 CM
ECHO LV IVSD: 1.2 CM (ref 0.6–0.9)
ECHO LV MASS 2D: 178.2 G (ref 67–162)
ECHO LV MASS INDEX 2D: 113.5 G/M2 (ref 43–95)
ECHO LV POSTERIOR WALL DIASTOLIC: 1.2 CM (ref 0.6–0.9)
ECHO LV RELATIVE WALL THICKNESS RATIO: 0.57
ECHO LVOT AREA: 3.1 CM2
ECHO LVOT AV VTI INDEX: 0.74
ECHO LVOT DIAM: 2 CM
ECHO LVOT MEAN GRADIENT: 2 MMHG
ECHO LVOT PEAK GRADIENT: 4 MMHG
ECHO LVOT PEAK VELOCITY: 1.1 M/S
ECHO LVOT STROKE VOLUME INDEX: 44.4 ML/M2
ECHO LVOT SV: 69.7 ML
ECHO LVOT VTI: 22.2 CM
ECHO MV A VELOCITY: 1.08 M/S
ECHO MV AREA VTI: 1.8 CM2
ECHO MV E DECELERATION TIME (DT): 183 MS
ECHO MV E VELOCITY: 0.93 M/S
ECHO MV E/A RATIO: 0.86
ECHO MV E/E' LATERAL: 13.29
ECHO MV E/E' RATIO (AVERAGED): 15.94
ECHO MV E/E' SEPTAL: 18.6
ECHO MV LVOT VTI INDEX: 1.73
ECHO MV MAX VELOCITY: 1.3 M/S
ECHO MV MEAN GRADIENT: 2 MMHG
ECHO MV MEAN VELOCITY: 0.7 M/S
ECHO MV PEAK GRADIENT: 7 MMHG
ECHO MV VTI: 38.5 CM
ECHO RIGHT VENTRICULAR SYSTOLIC PRESSURE (RVSP): 24 MMHG
ECHO RV TAPSE: 1.8 CM (ref 1.7–?)
ECHO TV REGURGITANT MAX VELOCITY: 2.29 M/S
ECHO TV REGURGITANT PEAK GRADIENT: 21 MMHG
EOSINOPHIL # BLD: 0.4 K/UL (ref 0–0.4)
EOSINOPHILS RELATIVE PERCENT: 4 % (ref 0–4)
ERYTHROCYTE [DISTWIDTH] IN BLOOD BY AUTOMATED COUNT: 13.1 % (ref 11.5–14.9)
GFR SERPL CREATININE-BSD FRML MDRD: >60 ML/MIN/1.73M2
GGT SERPL-CCNC: 217 U/L (ref 5–36)
GLUCOSE SERPL-MCNC: 113 MG/DL (ref 70–99)
HCT VFR BLD AUTO: 28.3 % (ref 36–46)
HGB BLD-MCNC: 9.3 G/DL (ref 12–16)
LKM AB TITR SER IF: NORMAL {TITER}
LYMPHOCYTES NFR BLD: 0.6 K/UL (ref 1–4.8)
LYMPHOCYTES RELATIVE PERCENT: 6 % (ref 24–44)
MCH RBC QN AUTO: 29.7 PG (ref 26–34)
MCHC RBC AUTO-ENTMCNC: 32.8 G/DL (ref 31–37)
MCV RBC AUTO: 90.7 FL (ref 80–100)
MONOCYTES NFR BLD: 0.7 K/UL (ref 0.1–1.3)
MONOCYTES NFR BLD: 6 % (ref 1–7)
NEUTROPHILS NFR BLD: 84 % (ref 36–66)
NEUTS SEG NFR BLD: 8.8 K/UL (ref 1.3–9.1)
PLATELET # BLD AUTO: 200 K/UL (ref 150–450)
PMV BLD AUTO: 8.4 FL (ref 6–12)
POTASSIUM SERPL-SCNC: 4.4 MMOL/L (ref 3.7–5.3)
PROT SERPL-MCNC: 6.1 G/DL (ref 6.4–8.3)
RBC # BLD AUTO: 3.12 M/UL (ref 4–5.2)
SMOOTH MUSCLE ANTIBODY: 16 UNITS (ref 0–19)
SODIUM SERPL-SCNC: 132 MMOL/L (ref 135–144)
TROPONIN I SERPL HS-MCNC: 28 NG/L (ref 0–14)
TROPONIN I SERPL HS-MCNC: 28 NG/L (ref 0–14)
WBC OTHER # BLD: 10.5 K/UL (ref 3.5–11)

## 2023-09-14 PROCEDURE — C9113 INJ PANTOPRAZOLE SODIUM, VIA: HCPCS | Performed by: NURSE PRACTITIONER

## 2023-09-14 PROCEDURE — APPSS30 APP SPLIT SHARED TIME 16-30 MINUTES: Performed by: NURSE PRACTITIONER

## 2023-09-14 PROCEDURE — 6370000000 HC RX 637 (ALT 250 FOR IP): Performed by: INTERNAL MEDICINE

## 2023-09-14 PROCEDURE — 1200000000 HC SEMI PRIVATE

## 2023-09-14 PROCEDURE — 6360000002 HC RX W HCPCS: Performed by: INTERNAL MEDICINE

## 2023-09-14 PROCEDURE — 2580000003 HC RX 258: Performed by: NURSE PRACTITIONER

## 2023-09-14 PROCEDURE — 2500000003 HC RX 250 WO HCPCS: Performed by: INTERNAL MEDICINE

## 2023-09-14 PROCEDURE — 85025 COMPLETE CBC W/AUTO DIFF WBC: CPT

## 2023-09-14 PROCEDURE — 93306 TTE W/DOPPLER COMPLETE: CPT

## 2023-09-14 PROCEDURE — 99223 1ST HOSP IP/OBS HIGH 75: CPT | Performed by: INTERNAL MEDICINE

## 2023-09-14 PROCEDURE — 6360000002 HC RX W HCPCS: Performed by: NURSE PRACTITIONER

## 2023-09-14 PROCEDURE — A4216 STERILE WATER/SALINE, 10 ML: HCPCS | Performed by: NURSE PRACTITIONER

## 2023-09-14 PROCEDURE — 36415 COLL VENOUS BLD VENIPUNCTURE: CPT

## 2023-09-14 PROCEDURE — 84484 ASSAY OF TROPONIN QUANT: CPT

## 2023-09-14 PROCEDURE — 80053 COMPREHEN METABOLIC PANEL: CPT

## 2023-09-14 PROCEDURE — 71045 X-RAY EXAM CHEST 1 VIEW: CPT

## 2023-09-14 PROCEDURE — 99232 SBSQ HOSP IP/OBS MODERATE 35: CPT | Performed by: INTERNAL MEDICINE

## 2023-09-14 PROCEDURE — 93005 ELECTROCARDIOGRAM TRACING: CPT

## 2023-09-14 PROCEDURE — 93306 TTE W/DOPPLER COMPLETE: CPT | Performed by: INTERNAL MEDICINE

## 2023-09-14 PROCEDURE — 2580000003 HC RX 258: Performed by: INTERNAL MEDICINE

## 2023-09-14 PROCEDURE — 99233 SBSQ HOSP IP/OBS HIGH 50: CPT | Performed by: INTERNAL MEDICINE

## 2023-09-14 PROCEDURE — 83880 ASSAY OF NATRIURETIC PEPTIDE: CPT

## 2023-09-14 RX ORDER — GABAPENTIN 300 MG/1
300 CAPSULE ORAL 2 TIMES DAILY
Status: DISCONTINUED | OUTPATIENT
Start: 2023-09-14 | End: 2023-09-29 | Stop reason: HOSPADM

## 2023-09-14 RX ORDER — LOSARTAN POTASSIUM 50 MG/1
50 TABLET ORAL DAILY
Status: DISCONTINUED | OUTPATIENT
Start: 2023-09-14 | End: 2023-09-24

## 2023-09-14 RX ORDER — PRAVASTATIN SODIUM 40 MG
40 TABLET ORAL DAILY
Status: DISCONTINUED | OUTPATIENT
Start: 2023-09-14 | End: 2023-09-29 | Stop reason: HOSPADM

## 2023-09-14 RX ORDER — ASPIRIN 81 MG/1
81 TABLET ORAL DAILY
Status: DISCONTINUED | OUTPATIENT
Start: 2023-09-14 | End: 2023-09-29 | Stop reason: HOSPADM

## 2023-09-14 RX ADMIN — METRONIDAZOLE 500 MG: 500 INJECTION, SOLUTION INTRAVENOUS at 10:35

## 2023-09-14 RX ADMIN — CEFTRIAXONE SODIUM 1000 MG: 1 INJECTION, POWDER, FOR SOLUTION INTRAMUSCULAR; INTRAVENOUS at 12:18

## 2023-09-14 RX ADMIN — PRAVASTATIN SODIUM 40 MG: 40 TABLET ORAL at 10:33

## 2023-09-14 RX ADMIN — ENOXAPARIN SODIUM 40 MG: 100 INJECTION SUBCUTANEOUS at 10:33

## 2023-09-14 RX ADMIN — METRONIDAZOLE 500 MG: 500 INJECTION, SOLUTION INTRAVENOUS at 02:44

## 2023-09-14 RX ADMIN — SODIUM CHLORIDE, PRESERVATIVE FREE 5 ML: 5 INJECTION INTRAVENOUS at 21:31

## 2023-09-14 RX ADMIN — SODIUM CHLORIDE, PRESERVATIVE FREE 40 MG: 5 INJECTION INTRAVENOUS at 10:34

## 2023-09-14 RX ADMIN — METRONIDAZOLE 500 MG: 500 INJECTION, SOLUTION INTRAVENOUS at 17:48

## 2023-09-14 RX ADMIN — LOSARTAN POTASSIUM 50 MG: 50 TABLET, FILM COATED ORAL at 10:33

## 2023-09-14 RX ADMIN — GABAPENTIN 300 MG: 300 CAPSULE ORAL at 21:28

## 2023-09-14 RX ADMIN — POTASSIUM CHLORIDE, DEXTROSE MONOHYDRATE AND SODIUM CHLORIDE: 150; 5; 450 INJECTION, SOLUTION INTRAVENOUS at 04:14

## 2023-09-14 RX ADMIN — GABAPENTIN 300 MG: 300 CAPSULE ORAL at 10:33

## 2023-09-14 RX ADMIN — SODIUM CHLORIDE, PRESERVATIVE FREE 20 ML: 5 INJECTION INTRAVENOUS at 10:35

## 2023-09-14 RX ADMIN — ASPIRIN 81 MG: 81 TABLET, COATED ORAL at 10:33

## 2023-09-14 NOTE — PLAN OF CARE
Problem: Discharge Planning  Goal: Discharge to home or other facility with appropriate resources  9/14/2023 1622 by Erica Gonzales RN  Outcome: Progressing  Flowsheets (Taken 9/14/2023 1622)  Discharge to home or other facility with appropriate resources:   Identify barriers to discharge with patient and caregiver   Arrange for needed discharge resources and transportation as appropriate     Problem: Safety - Adult  Goal: Free from fall injury  9/14/2023 1622 by Erica Gonzales RN  Outcome: Progressing  Flowsheets (Taken 9/14/2023 1622)  Free From Fall Injury: Instruct family/caregiver on patient safety     Problem: Pain  Goal: Verbalizes/displays adequate comfort level or baseline comfort level  9/14/2023 1622 by Erica Gonzales RN  Outcome: Progressing     Problem: Chronic Conditions and Co-morbidities  Goal: Patient's chronic conditions and co-morbidity symptoms are monitored and maintained or improved  Outcome: Progressing

## 2023-09-14 NOTE — CONSULTS
Date:   9/14/2023  Patient name: Chasity Arreola  Date of admission:  9/12/2023  3:31 AM  MRN:   [id number]  YOB: 1940  PCP: No primary care provider on file. Reason for Admission: Choledocholithiasis [K80.50]    Cardiology consult       Referring physician: Dr Ryann Gutierrez  Admission with abdominal pain, fever and chills abnormal ALT AST, mildly dilated CBD  E. coli bacteremia, likely biliary source  Elevated proBNP, borderline abnormal high-sensitivity troponin  Anemia, severe iron deficiency  Hypertension  Hyperlipidemia  Chronic back pain  Severe iron deficiency with anemia  History of breast cancer status post surgery    Investigation work-up    ECG 9/14/2023 -12:30 PM  Rhythm, no ischemic ECG changes      MRI abdomen without contrast  Mild prominence of the common bile duct without intrahepatic biliary dilatation or convincing evidence for choledocholithiasis  Gallbladder ultrasound 9/12/2023  Borderline dilated common bile duct small right pleural effusion      2D echo 9/14/2023  Normal LV size mild LVH normal wall motion ejection fraction more than 55%  Grade 1 LV diastolic dysfunction  No significant valvular abnormality noted  No pericardial effusion  Normal IVC diameter and respiratory variation      History of present illness    82years old female with a past medical history of pretension, hyperlipidemia, breast cancer status postsurgery, gastroesophageal reflux disease sleep presented to [facility] with abdominal pain. Her symptoms started about a week prior to the admission. She felt better after avoiding fatty food. She had a relapse of symptoms again a day before admission that prompted ER visit. CT abdomen showed 1.4 cm CBD dilation that prompted her transport to [facility].  She has elevated AST and ALT however alkaline phosphatase and bilirubin normal.  Patient also reported having chills and fever with a temperature of 101-102.   Further history examination she has been experiencing tiredness and shortness of breath for many weeks. Has no previous history of coronary intervention. Early this morning she had lower chest and upper abdominal discomfort like a very heavy pressure which lasted for about half an hour. No nausea no diaphoresis no palpitation.     Lab work on admission  Sodium 130, potassium 5.3, BUN 23, creatinine 1.0, glucose 172  Albumin 3.3, alkaline phosphatase 59, , , bilirubin 1.3  WBC 15.9, hemoglobin 8.6, MCV 90.9, neutrophils 68, platelets 998  Ferritin 238, iron 13, saturation 5, TIBC 237, B12 850, INR 1.1    Current evaluation    Patient seen and examined  Elderly tired looking female  She was resting on bed in upright position  At present she does not have abdominal discomfort chest pain  ECG monitor sinus rhythm  No peripheral edema veins were normal    Today's lab work showed proBNP 5509, sensitivity troponin 28  Sodium 132, potassium 4.4, glucose 113, albumin 3.2, AST 44, ALT 90, bilirubin 0.4  Hemoglobin 9.3, WBC 10.5, platelets 332    Medications:   Scheduled Meds:   aspirin  81 mg Oral Daily    gabapentin  300 mg Oral BID    losartan  50 mg Oral Daily    pravastatin  40 mg Oral Daily    sodium chloride flush  5-40 mL IntraVENous 2 times per day    enoxaparin  40 mg SubCUTAneous Daily    cefTRIAXone (ROCEPHIN) IV  1,000 mg IntraVENous Q24H    metroNIDAZOLE  500 mg IntraVENous Q8H    pantoprazole (PROTONIX) 40 mg in sodium chloride (PF) 0.9 % 10 mL injection  40 mg IntraVENous Daily     Continuous Infusions:   sodium chloride       CBC:   Recent Labs     09/12/23  0852 09/13/23  0549 09/14/23  0531   WBC 15.9* 14.9* 10.5   HGB 8.6* 8.5* 9.3*    170 200     BMP:    Recent Labs     09/12/23  0852 09/13/23  0549 09/14/23  0531   * 131* 132*   K 5.3 4.9 4.4    104 104   CO2 21 20 21   BUN 23 14 12   CREATININE 1.0* 0.8 0.8   GLUCOSE 172* 162* 113*     Hepatic:   Recent Labs     09/12/23  9748

## 2023-09-14 NOTE — CARE COORDINATION
ONGOING DISCHARGE PLAN:    Patient is alert and oriented x4. Spoke with patient regarding discharge plan and patient confirms that plan is possibly rehab facility with IV antibiotics. Patient from home alone and does not have anyone to assist with IV atb. Family lives in [facility]. +BC bacteremia, IV rocephin daily/IV flagyl every 8 hrs, WBC 10.5 improved  Will need 2 weeks antibiotics per notes    MRCP negative    General surgery and GI following    New cardio consult-tachycardia, chest pressure    PT/OT ordered                        Post Acute Facility/Agency List     Provided patient with the following list, the list includes the overall star ratings obtained from CMS per the Medicare Web site (www.Medicare.gov):     [] [address]  [] Acute Inpatient Rehabilitation Facilities  [x] Skilled Nursing Facilities  [] Home Care    Provided verbal instructions on how to utilize the QR Code to obtain additional detailed star ratings from www. Medicare. gov     offered to print and provide the detailed list:    [x]Accepted   []Declined         Will continue to follow for additional discharge needs. If patient is discharged prior to next notation, then this note serves as note for discharge by case management.     Electronically signed by Vik Lopez RN on 9/14/2023 at 9:36 AM

## 2023-09-14 NOTE — PROGRESS NOTES
[employer] lab called for blood culture sensitivities. Results to be faxed. 1512: Dr. Romeo Cm notified of sensitivities, no new orders received at this time.

## 2023-09-14 NOTE — PROGRESS NOTES
[facility] Internal Medicine  Yossi Wright MD; Jody Avery MD; Delvin Cleaning MD; MD Alessandra Kapoor MD; Julio Cesar Fabian MD    [facility] Internal Medicine   [address]    Progress note            Date:   9/14/2023  Patient name:  Johnny Breaux  Date of admission:  9/12/2023  3:31 AM  MRN:   [id number]  Account:  [de-identified]  YOB: 1940  PCP:    No primary care provider on file. Room:   [facility]  Code Status:    Full Code    Chief Complaint:     No chief complaint on file. Abdominal pain    History Obtained From:     patient    History of Present Illness:     Johnny Breaux is a 82 y.o. Unavailable / unknown female who presents with No chief complaint on file. and is admitted to the hospital for the management of Choledocholithiasis. Past Medical History:     Past Medical History:   Diagnosis Date    Arthritis     Cancer ([address] [facility])     Chronic back pain     Diabetes mellitus ([address] [place] [address])     DM (diabetes mellitus) ([address] [place] [address])     GERD (gastroesophageal reflux disease)     Hypertension     Spinal stenosis     Spondylolysis         Past Surgical History:     Past Surgical History:   Procedure Laterality Date    BACK SURGERY      BREAST SURGERY      JOINT REPLACEMENT      TONSILLECTOMY          Medications Prior to Admission:     Prior to Admission medications    Medication Sig Start Date End Date Taking?  Authorizing Provider   Ascorbic Acid (VITAMIN C) 250 MG tablet Take 1 tablet by mouth in the morning and at bedtime   Yes Historical Provider, MD   ASPIRIN 81 PO Take 81 mg by mouth daily   Yes Historical Provider, MD   vitamin D (CHOLECALCIFEROL) 25 MCG (1000 UT) TABS tablet Take 1 tablet by mouth daily   Yes Historical Provider, MD   cyanocobalamin 1000 MCG tablet Take 1 tablet by mouth daily   Yes Historical Provider, MD   famotidine (PEPCID) 20 MG tablet Take 1 tablet by mouth 2 times daily   Yes Historical Provider, Ao Root Index 1.78 cm/m2    AV Velocity Ratio 0.69     LVOT:AV VTI Index 0.74     SACHIN/BSA VTI 1.5 cm2/m2    SACHIN/BSA Peak Velocity 1.3 cm2/m2    MV:LVOT VTI Index 1.73     Est. RA Pressure 3 mmHg    RVSP 24 mmHg       Imaging/Diagnostics:  No results found. Assessment :      Hospital Problems             Last Modified POA    * (Principal) Choledocholithiasis 9/12/2023 Yes    Benign essential HTN 9/12/2023 Yes    Diabetes mellitus type 2 in nonobese [facility]) 9/12/2023 Yes    Mixed hyperlipidemia 9/12/2023 Yes    Acute gastroenteritis 9/12/2023 Yes    Dilation of biliary tract 9/12/2023 Yes    Abnormal LFTs 9/12/2023 Yes    Right upper quadrant abdominal pain 9/12/2023 Yes    Weight loss 9/12/2023 Yes    Bacteremia, escherichia coli 9/13/2023 Yes     Plan:     Patient status inpatient in the Med/Surge    3  31-year-old female admitted with acute abdominal pain possibly secondary to choledocholithiasis, 1.4 cm CBD dilation, MRCP ordered, discussed with gastroenterology, general surgery to see the patient,  Gastroenteritis, continue Rocephin and Flagyl,  Diabetes mellitus, continue to monitor sugars, check A1c,  Hypertension, continue home medications,  Hyperlipidemia, will hold statins at this time  DVT prophylaxis with Lovenox,  Full CODE STATUS    2.  Disposition 2d    September 14,  Seen and examined face-to-face,   abdominal pain little better,  Had MRCP done, seems to be negative,  Ultrasound no obvious evidence of cholecystitis,  Gastroenteritis, inflammation around pylorus,   E. coli bacteremia, on Rocephin and Flagyl at this time, infectious disease consulted  general surgery not planning to do any surgery at this time,   Iron deficiency anemia, with iron saturations of 5, stool for occult blood ordered, no recent EGD or colonoscopy, gastroenterology on board  possible discharge on 2 weeks of abx,  Chest pain and supraventricular tachycardia episode this morning, stat EKG, looks normal at this time, troponin

## 2023-09-14 NOTE — CARE COORDINATION
ONGOING DISCHARGE PLAN:    RN spoke with Dr Winston Smith he stated the patient will discharge on oral antibiotics. No need for SNF at this time. Patient updated.    Electronically signed by Mee Underwood RN on 9/14/2023 at 10:07 AM

## 2023-09-14 NOTE — PROGRESS NOTES
Patient tachycardic with hr in 150s. Nurse went to check patient, patient states that she has some chest discomfort and rates it a 3/10. Nurse updated attending NP, Monica Alaniz. 160.02

## 2023-09-14 NOTE — PLAN OF CARE
Problem: Discharge Planning  Goal: Discharge to home or other facility with appropriate resources  9/14/2023 0554 by Angel Bower RN  Outcome: Progressing     Problem: Pain  Goal: Verbalizes/displays adequate comfort level or baseline comfort level  9/14/2023 0554 by Angel Bower RN  Outcome: Progressing     Problem: Safety - Adult  Goal: Free from fall injury  9/14/2023 0554 by Angel Bower RN  Outcome: Adequate for Discharge  Note: Patient up with standby assist, bed alarm on.

## 2023-09-15 ENCOUNTER — APPOINTMENT (OUTPATIENT)
Dept: CT IMAGING | Age: 83
DRG: 981 | End: 2023-09-15
Attending: INTERNAL MEDICINE
Payer: MEDICARE

## 2023-09-15 ENCOUNTER — APPOINTMENT (OUTPATIENT)
Dept: GENERAL RADIOLOGY | Age: 83
DRG: 981 | End: 2023-09-15
Attending: INTERNAL MEDICINE
Payer: MEDICARE

## 2023-09-15 ENCOUNTER — APPOINTMENT (OUTPATIENT)
Dept: INTERVENTIONAL RADIOLOGY/VASCULAR | Age: 83
DRG: 981 | End: 2023-09-15
Attending: INTERNAL MEDICINE
Payer: MEDICARE

## 2023-09-15 ENCOUNTER — ANESTHESIA (OUTPATIENT)
Dept: OPERATING ROOM | Age: 83
End: 2023-09-15
Payer: MEDICARE

## 2023-09-15 ENCOUNTER — ANESTHESIA EVENT (OUTPATIENT)
Dept: OPERATING ROOM | Age: 83
End: 2023-09-15
Payer: MEDICARE

## 2023-09-15 LAB
ALBUMIN SERPL-MCNC: 3.4 G/DL (ref 3.5–5.2)
ALP SERPL-CCNC: 81 U/L (ref 35–104)
ALT SERPL-CCNC: 60 U/L (ref 5–33)
ANION GAP SERPL CALCULATED.3IONS-SCNC: 9 MMOL/L (ref 9–17)
AST SERPL-CCNC: 22 U/L
BASOPHILS # BLD: 0 K/UL (ref 0–0.2)
BASOPHILS NFR BLD: 1 % (ref 0–2)
BILIRUB DIRECT SERPL-MCNC: 0.2 MG/DL
BILIRUB INDIRECT SERPL-MCNC: 0.2 MG/DL (ref 0–1)
BILIRUB SERPL-MCNC: 0.4 MG/DL (ref 0.3–1.2)
BILIRUB UR QL STRIP: NEGATIVE
BUN SERPL-MCNC: 16 MG/DL (ref 8–23)
CALCIUM SERPL-MCNC: 10.3 MG/DL (ref 8.6–10.4)
CHLORIDE SERPL-SCNC: 102 MMOL/L (ref 98–107)
CLARITY UR: CLEAR
CO2 SERPL-SCNC: 22 MMOL/L (ref 20–31)
COLOR UR: YELLOW
COMMENT: NORMAL
CREAT SERPL-MCNC: 0.9 MG/DL (ref 0.5–0.9)
EOSINOPHIL # BLD: 0.5 K/UL (ref 0–0.4)
EOSINOPHILS RELATIVE PERCENT: 7 % (ref 0–4)
ERYTHROCYTE [DISTWIDTH] IN BLOOD BY AUTOMATED COUNT: 13.4 % (ref 11.5–14.9)
GFR SERPL CREATININE-BSD FRML MDRD: >60 ML/MIN/1.73M2
GLUCOSE BLD-MCNC: 115 MG/DL (ref 65–105)
GLUCOSE BLD-MCNC: 90 MG/DL (ref 65–105)
GLUCOSE SERPL-MCNC: 102 MG/DL (ref 70–99)
GLUCOSE UR STRIP-MCNC: NEGATIVE MG/DL
HCT VFR BLD AUTO: 31.9 % (ref 36–46)
HGB BLD-MCNC: 10.4 G/DL (ref 12–16)
HGB UR QL STRIP.AUTO: NEGATIVE
KETONES UR STRIP-MCNC: NEGATIVE MG/DL
LEUKOCYTE ESTERASE UR QL STRIP: NEGATIVE
LYMPHOCYTES NFR BLD: 1 K/UL (ref 1–4.8)
LYMPHOCYTES RELATIVE PERCENT: 15 % (ref 24–44)
MCH RBC QN AUTO: 29.6 PG (ref 26–34)
MCHC RBC AUTO-ENTMCNC: 32.5 G/DL (ref 31–37)
MCV RBC AUTO: 90.8 FL (ref 80–100)
MONOCYTES NFR BLD: 0.8 K/UL (ref 0.1–1.3)
MONOCYTES NFR BLD: 12 % (ref 1–7)
NEUTROPHILS NFR BLD: 65 % (ref 36–66)
NEUTS SEG NFR BLD: 4.6 K/UL (ref 1.3–9.1)
NITRITE UR QL STRIP: NEGATIVE
PH UR STRIP: 5.5 [PH] (ref 5–8)
PLATELET # BLD AUTO: 245 K/UL (ref 150–450)
PMV BLD AUTO: 8.2 FL (ref 6–12)
POTASSIUM SERPL-SCNC: 4.6 MMOL/L (ref 3.7–5.3)
PROT SERPL-MCNC: 6.5 G/DL (ref 6.4–8.3)
PROT UR STRIP-MCNC: NEGATIVE MG/DL
RBC # BLD AUTO: 3.52 M/UL (ref 4–5.2)
SODIUM SERPL-SCNC: 133 MMOL/L (ref 135–144)
SP GR UR STRIP: 1.03 (ref 1–1.03)
UROBILINOGEN UR STRIP-ACNC: NORMAL EU/DL (ref 0–1)
WBC OTHER # BLD: 6.9 K/UL (ref 3.5–11)

## 2023-09-15 PROCEDURE — 2580000003 HC RX 258: Performed by: INTERNAL MEDICINE

## 2023-09-15 PROCEDURE — 3700000000 HC ANESTHESIA ATTENDED CARE: Performed by: SURGERY

## 2023-09-15 PROCEDURE — 2709999900 HC NON-CHARGEABLE SUPPLY: Performed by: SURGERY

## 2023-09-15 PROCEDURE — 2500000003 HC RX 250 WO HCPCS: Performed by: NURSE ANESTHETIST, CERTIFIED REGISTERED

## 2023-09-15 PROCEDURE — 2580000003 HC RX 258: Performed by: SURGERY

## 2023-09-15 PROCEDURE — 6370000000 HC RX 637 (ALT 250 FOR IP): Performed by: INTERNAL MEDICINE

## 2023-09-15 PROCEDURE — 6360000002 HC RX W HCPCS: Performed by: NURSE PRACTITIONER

## 2023-09-15 PROCEDURE — 2580000003 HC RX 258

## 2023-09-15 PROCEDURE — 2709999900 HC NON-CHARGEABLE SUPPLY: Performed by: INTERNAL MEDICINE

## 2023-09-15 PROCEDURE — 3700000000 HC ANESTHESIA ATTENDED CARE: Performed by: INTERNAL MEDICINE

## 2023-09-15 PROCEDURE — 0DQ98ZZ REPAIR DUODENUM, VIA NATURAL OR ARTIFICIAL OPENING ENDOSCOPIC: ICD-10-PCS | Performed by: SURGERY

## 2023-09-15 PROCEDURE — 6360000002 HC RX W HCPCS: Performed by: INTERNAL MEDICINE

## 2023-09-15 PROCEDURE — 6360000002 HC RX W HCPCS: Performed by: ANESTHESIOLOGY

## 2023-09-15 PROCEDURE — 43235 EGD DIAGNOSTIC BRUSH WASH: CPT | Performed by: SURGERY

## 2023-09-15 PROCEDURE — 2580000003 HC RX 258: Performed by: NURSE PRACTITIONER

## 2023-09-15 PROCEDURE — 3600000012 HC SURGERY LEVEL 2 ADDTL 15MIN: Performed by: SURGERY

## 2023-09-15 PROCEDURE — 94761 N-INVAS EAR/PLS OXIMETRY MLT: CPT

## 2023-09-15 PROCEDURE — 6360000004 HC RX CONTRAST MEDICATION: Performed by: SURGERY

## 2023-09-15 PROCEDURE — 2700000000 HC OXYGEN THERAPY PER DAY

## 2023-09-15 PROCEDURE — 7100000000 HC PACU RECOVERY - FIRST 15 MIN: Performed by: SURGERY

## 2023-09-15 PROCEDURE — 2500000003 HC RX 250 WO HCPCS

## 2023-09-15 PROCEDURE — A4216 STERILE WATER/SALINE, 10 ML: HCPCS | Performed by: NURSE PRACTITIONER

## 2023-09-15 PROCEDURE — 80048 BASIC METABOLIC PNL TOTAL CA: CPT

## 2023-09-15 PROCEDURE — C9113 INJ PANTOPRAZOLE SODIUM, VIA: HCPCS | Performed by: NURSE PRACTITIONER

## 2023-09-15 PROCEDURE — 85025 COMPLETE CBC W/AUTO DIFF WBC: CPT

## 2023-09-15 PROCEDURE — 43840 GSTRRPHY SUTR DUOL/GSTR ULCR: CPT | Performed by: SURGERY

## 2023-09-15 PROCEDURE — 6360000002 HC RX W HCPCS

## 2023-09-15 PROCEDURE — 3600000002 HC SURGERY LEVEL 2 BASE: Performed by: SURGERY

## 2023-09-15 PROCEDURE — 74018 RADEX ABDOMEN 1 VIEW: CPT

## 2023-09-15 PROCEDURE — 2000000000 HC ICU R&B

## 2023-09-15 PROCEDURE — 6360000002 HC RX W HCPCS: Performed by: NURSE ANESTHETIST, CERTIFIED REGISTERED

## 2023-09-15 PROCEDURE — 36415 COLL VENOUS BLD VENIPUNCTURE: CPT

## 2023-09-15 PROCEDURE — 97535 SELF CARE MNGMENT TRAINING: CPT

## 2023-09-15 PROCEDURE — 74177 CT ABD & PELVIS W/CONTRAST: CPT

## 2023-09-15 PROCEDURE — 7100000000 HC PACU RECOVERY - FIRST 15 MIN: Performed by: INTERNAL MEDICINE

## 2023-09-15 PROCEDURE — 0DB68ZX EXCISION OF STOMACH, VIA NATURAL OR ARTIFICIAL OPENING ENDOSCOPIC, DIAGNOSTIC: ICD-10-PCS | Performed by: INTERNAL MEDICINE

## 2023-09-15 PROCEDURE — 97162 PT EVAL MOD COMPLEX 30 MIN: CPT

## 2023-09-15 PROCEDURE — 2500000003 HC RX 250 WO HCPCS: Performed by: SURGERY

## 2023-09-15 PROCEDURE — 7100000001 HC PACU RECOVERY - ADDTL 15 MIN: Performed by: INTERNAL MEDICINE

## 2023-09-15 PROCEDURE — 3609018500 HC EGD US SCOPE W/ADJACENT STRUCTURES: Performed by: INTERNAL MEDICINE

## 2023-09-15 PROCEDURE — 2720000010 HC SURG SUPPLY STERILE: Performed by: SURGERY

## 2023-09-15 PROCEDURE — 81003 URINALYSIS AUTO W/O SCOPE: CPT

## 2023-09-15 PROCEDURE — C1769 GUIDE WIRE: HCPCS | Performed by: INTERNAL MEDICINE

## 2023-09-15 PROCEDURE — 88342 IMHCHEM/IMCYTCHM 1ST ANTB: CPT

## 2023-09-15 PROCEDURE — 3700000001 HC ADD 15 MINUTES (ANESTHESIA): Performed by: INTERNAL MEDICINE

## 2023-09-15 PROCEDURE — 97116 GAIT TRAINING THERAPY: CPT

## 2023-09-15 PROCEDURE — 7100000001 HC PACU RECOVERY - ADDTL 15 MIN: Performed by: SURGERY

## 2023-09-15 PROCEDURE — 88305 TISSUE EXAM BY PATHOLOGIST: CPT

## 2023-09-15 PROCEDURE — 82947 ASSAY GLUCOSE BLOOD QUANT: CPT

## 2023-09-15 PROCEDURE — 3700000001 HC ADD 15 MINUTES (ANESTHESIA): Performed by: SURGERY

## 2023-09-15 PROCEDURE — 99232 SBSQ HOSP IP/OBS MODERATE 35: CPT | Performed by: INTERNAL MEDICINE

## 2023-09-15 PROCEDURE — 97166 OT EVAL MOD COMPLEX 45 MIN: CPT

## 2023-09-15 PROCEDURE — 80076 HEPATIC FUNCTION PANEL: CPT

## 2023-09-15 PROCEDURE — 2580000003 HC RX 258: Performed by: NURSE ANESTHETIST, CERTIFIED REGISTERED

## 2023-09-15 PROCEDURE — C9399 UNCLASSIFIED DRUGS OR BIOLOG: HCPCS | Performed by: NURSE ANESTHETIST, CERTIFIED REGISTERED

## 2023-09-15 RX ORDER — METOCLOPRAMIDE HYDROCHLORIDE 5 MG/ML
10 INJECTION INTRAMUSCULAR; INTRAVENOUS
Status: DISCONTINUED | OUTPATIENT
Start: 2023-09-15 | End: 2023-09-15 | Stop reason: HOSPADM

## 2023-09-15 RX ORDER — SODIUM CHLORIDE 0.9 % (FLUSH) 0.9 %
5-40 SYRINGE (ML) INJECTION EVERY 12 HOURS SCHEDULED
Status: DISCONTINUED | OUTPATIENT
Start: 2023-09-15 | End: 2023-09-15 | Stop reason: HOSPADM

## 2023-09-15 RX ORDER — PROPOFOL 10 MG/ML
INJECTION, EMULSION INTRAVENOUS PRN
Status: DISCONTINUED | OUTPATIENT
Start: 2023-09-15 | End: 2023-09-15 | Stop reason: SDUPTHER

## 2023-09-15 RX ORDER — ONDANSETRON 2 MG/ML
INJECTION INTRAMUSCULAR; INTRAVENOUS PRN
Status: DISCONTINUED | OUTPATIENT
Start: 2023-09-15 | End: 2023-09-15 | Stop reason: SDUPTHER

## 2023-09-15 RX ORDER — FENTANYL CITRATE 0.05 MG/ML
25 INJECTION, SOLUTION INTRAMUSCULAR; INTRAVENOUS EVERY 5 MIN PRN
Status: DISCONTINUED | OUTPATIENT
Start: 2023-09-15 | End: 2023-09-15 | Stop reason: HOSPADM

## 2023-09-15 RX ORDER — LIDOCAINE HYDROCHLORIDE 10 MG/ML
INJECTION, SOLUTION EPIDURAL; INFILTRATION; INTRACAUDAL; PERINEURAL PRN
Status: DISCONTINUED | OUTPATIENT
Start: 2023-09-15 | End: 2023-09-15 | Stop reason: SDUPTHER

## 2023-09-15 RX ORDER — METRONIDAZOLE 500 MG/100ML
INJECTION, SOLUTION INTRAVENOUS PRN
Status: DISCONTINUED | OUTPATIENT
Start: 2023-09-15 | End: 2023-09-15 | Stop reason: SDUPTHER

## 2023-09-15 RX ORDER — PHENYLEPHRINE HYDROCHLORIDE 10 MG/ML
INJECTION INTRAVENOUS PRN
Status: DISCONTINUED | OUTPATIENT
Start: 2023-09-15 | End: 2023-09-15 | Stop reason: SDUPTHER

## 2023-09-15 RX ORDER — DIPHENHYDRAMINE HYDROCHLORIDE 50 MG/ML
12.5 INJECTION INTRAMUSCULAR; INTRAVENOUS
Status: DISCONTINUED | OUTPATIENT
Start: 2023-09-15 | End: 2023-09-15 | Stop reason: HOSPADM

## 2023-09-15 RX ORDER — BUPIVACAINE HYDROCHLORIDE AND EPINEPHRINE 2.5; 5 MG/ML; UG/ML
INJECTION, SOLUTION EPIDURAL; INFILTRATION; INTRACAUDAL; PERINEURAL PRN
Status: DISCONTINUED | OUTPATIENT
Start: 2023-09-15 | End: 2023-09-15 | Stop reason: ALTCHOICE

## 2023-09-15 RX ORDER — ONDANSETRON 2 MG/ML
4 INJECTION INTRAMUSCULAR; INTRAVENOUS
Status: DISCONTINUED | OUTPATIENT
Start: 2023-09-15 | End: 2023-09-15 | Stop reason: HOSPADM

## 2023-09-15 RX ORDER — ROCURONIUM BROMIDE 10 MG/ML
INJECTION, SOLUTION INTRAVENOUS PRN
Status: DISCONTINUED | OUTPATIENT
Start: 2023-09-15 | End: 2023-09-15 | Stop reason: SDUPTHER

## 2023-09-15 RX ORDER — HYDRALAZINE HYDROCHLORIDE 20 MG/ML
10 INJECTION INTRAMUSCULAR; INTRAVENOUS
Status: DISCONTINUED | OUTPATIENT
Start: 2023-09-15 | End: 2023-09-15 | Stop reason: HOSPADM

## 2023-09-15 RX ORDER — DEXAMETHASONE SODIUM PHOSPHATE 4 MG/ML
INJECTION, SOLUTION INTRA-ARTICULAR; INTRALESIONAL; INTRAMUSCULAR; INTRAVENOUS; SOFT TISSUE PRN
Status: DISCONTINUED | OUTPATIENT
Start: 2023-09-15 | End: 2023-09-15 | Stop reason: SDUPTHER

## 2023-09-15 RX ORDER — GLYCOPYRROLATE 0.2 MG/ML
INJECTION INTRAMUSCULAR; INTRAVENOUS PRN
Status: DISCONTINUED | OUTPATIENT
Start: 2023-09-15 | End: 2023-09-15 | Stop reason: SDUPTHER

## 2023-09-15 RX ORDER — METRONIDAZOLE 500 MG/100ML
500 INJECTION, SOLUTION INTRAVENOUS EVERY 8 HOURS
Status: DISCONTINUED | OUTPATIENT
Start: 2023-09-16 | End: 2023-09-16 | Stop reason: SDUPTHER

## 2023-09-15 RX ORDER — LIDOCAINE HYDROCHLORIDE 10 MG/ML
1 INJECTION, SOLUTION EPIDURAL; INFILTRATION; INTRACAUDAL; PERINEURAL
Status: DISCONTINUED | OUTPATIENT
Start: 2023-09-15 | End: 2023-09-15 | Stop reason: HOSPADM

## 2023-09-15 RX ORDER — SODIUM CHLORIDE 9 MG/ML
INJECTION, SOLUTION INTRAVENOUS PRN
Status: DISCONTINUED | OUTPATIENT
Start: 2023-09-15 | End: 2023-09-15 | Stop reason: HOSPADM

## 2023-09-15 RX ORDER — ONDANSETRON 2 MG/ML
4 INJECTION INTRAMUSCULAR; INTRAVENOUS EVERY 6 HOURS PRN
Status: DISCONTINUED | OUTPATIENT
Start: 2023-09-15 | End: 2023-09-17 | Stop reason: SDUPTHER

## 2023-09-15 RX ORDER — SODIUM CHLORIDE, SODIUM LACTATE, POTASSIUM CHLORIDE, CALCIUM CHLORIDE 600; 310; 30; 20 MG/100ML; MG/100ML; MG/100ML; MG/100ML
INJECTION, SOLUTION INTRAVENOUS CONTINUOUS
Status: DISCONTINUED | OUTPATIENT
Start: 2023-09-15 | End: 2023-09-18

## 2023-09-15 RX ORDER — LABETALOL HYDROCHLORIDE 5 MG/ML
10 INJECTION, SOLUTION INTRAVENOUS
Status: DISCONTINUED | OUTPATIENT
Start: 2023-09-15 | End: 2023-09-15 | Stop reason: HOSPADM

## 2023-09-15 RX ORDER — MEPERIDINE HYDROCHLORIDE 25 MG/ML
12.5 INJECTION INTRAMUSCULAR; INTRAVENOUS; SUBCUTANEOUS EVERY 5 MIN PRN
Status: DISCONTINUED | OUTPATIENT
Start: 2023-09-15 | End: 2023-09-15 | Stop reason: HOSPADM

## 2023-09-15 RX ORDER — FENTANYL CITRATE 50 UG/ML
INJECTION, SOLUTION INTRAMUSCULAR; INTRAVENOUS PRN
Status: DISCONTINUED | OUTPATIENT
Start: 2023-09-15 | End: 2023-09-15 | Stop reason: SDUPTHER

## 2023-09-15 RX ORDER — MAGNESIUM HYDROXIDE 1200 MG/15ML
LIQUID ORAL CONTINUOUS PRN
Status: COMPLETED | OUTPATIENT
Start: 2023-09-15 | End: 2023-09-15

## 2023-09-15 RX ORDER — SODIUM CHLORIDE 0.9 % (FLUSH) 0.9 %
5-40 SYRINGE (ML) INJECTION PRN
Status: DISCONTINUED | OUTPATIENT
Start: 2023-09-15 | End: 2023-09-15 | Stop reason: HOSPADM

## 2023-09-15 RX ORDER — DIPHENHYDRAMINE HYDROCHLORIDE 50 MG/ML
50 INJECTION INTRAMUSCULAR; INTRAVENOUS ONCE
Status: COMPLETED | OUTPATIENT
Start: 2023-09-15 | End: 2023-09-15

## 2023-09-15 RX ORDER — ACETAMINOPHEN 325 MG/1
650 TABLET ORAL
Status: DISCONTINUED | OUTPATIENT
Start: 2023-09-15 | End: 2023-09-15 | Stop reason: HOSPADM

## 2023-09-15 RX ORDER — SODIUM CHLORIDE 9 MG/ML
INJECTION, SOLUTION INTRAVENOUS CONTINUOUS
Status: DISCONTINUED | OUTPATIENT
Start: 2023-09-15 | End: 2023-09-15

## 2023-09-15 RX ORDER — SODIUM CHLORIDE 9 MG/ML
INJECTION, SOLUTION INTRAVENOUS CONTINUOUS PRN
Status: DISCONTINUED | OUTPATIENT
Start: 2023-09-15 | End: 2023-09-15 | Stop reason: SDUPTHER

## 2023-09-15 RX ORDER — MIDAZOLAM HYDROCHLORIDE 1 MG/ML
INJECTION INTRAMUSCULAR; INTRAVENOUS PRN
Status: DISCONTINUED | OUTPATIENT
Start: 2023-09-15 | End: 2023-09-15 | Stop reason: SDUPTHER

## 2023-09-15 RX ORDER — FENTANYL CITRATE 0.05 MG/ML
50 INJECTION, SOLUTION INTRAMUSCULAR; INTRAVENOUS
Status: DISCONTINUED | OUTPATIENT
Start: 2023-09-15 | End: 2023-09-29 | Stop reason: HOSPADM

## 2023-09-15 RX ADMIN — PHENYLEPHRINE HYDROCHLORIDE 200 MCG: 10 INJECTION INTRAVENOUS at 16:05

## 2023-09-15 RX ADMIN — PROPOFOL 220 MG: 10 INJECTION, EMULSION INTRAVENOUS at 13:03

## 2023-09-15 RX ADMIN — CEFEPIME 2000 MG: 2 INJECTION, POWDER, FOR SOLUTION INTRAVENOUS at 16:07

## 2023-09-15 RX ADMIN — SODIUM CHLORIDE: 9 INJECTION, SOLUTION INTRAVENOUS at 15:43

## 2023-09-15 RX ADMIN — GABAPENTIN 300 MG: 300 CAPSULE ORAL at 09:49

## 2023-09-15 RX ADMIN — HYDROMORPHONE HYDROCHLORIDE 0.5 MG: 1 INJECTION, SOLUTION INTRAMUSCULAR; INTRAVENOUS; SUBCUTANEOUS at 17:50

## 2023-09-15 RX ADMIN — PRAVASTATIN SODIUM 40 MG: 40 TABLET ORAL at 09:49

## 2023-09-15 RX ADMIN — SODIUM CHLORIDE: 9 INJECTION, SOLUTION INTRAVENOUS at 12:56

## 2023-09-15 RX ADMIN — LIDOCAINE HYDROCHLORIDE 50 MG: 10 INJECTION, SOLUTION EPIDURAL; INFILTRATION; INTRACAUDAL; PERINEURAL at 15:51

## 2023-09-15 RX ADMIN — ONDANSETRON 4 MG: 2 INJECTION INTRAMUSCULAR; INTRAVENOUS at 16:54

## 2023-09-15 RX ADMIN — LOSARTAN POTASSIUM 50 MG: 50 TABLET, FILM COATED ORAL at 09:49

## 2023-09-15 RX ADMIN — MIDAZOLAM 2 MG: 1 INJECTION INTRAMUSCULAR; INTRAVENOUS at 15:43

## 2023-09-15 RX ADMIN — SODIUM CHLORIDE, PRESERVATIVE FREE 10 ML: 5 INJECTION INTRAVENOUS at 09:59

## 2023-09-15 RX ADMIN — SUGAMMADEX 200 MG: 100 INJECTION, SOLUTION INTRAVENOUS at 17:09

## 2023-09-15 RX ADMIN — DIPHENHYDRAMINE HYDROCHLORIDE 50 MG: 50 INJECTION INTRAMUSCULAR; INTRAVENOUS at 14:36

## 2023-09-15 RX ADMIN — METRONIDAZOLE 500 MG: 500 SOLUTION INTRAVENOUS at 16:37

## 2023-09-15 RX ADMIN — METRONIDAZOLE 500 MG: 500 INJECTION, SOLUTION INTRAVENOUS at 11:04

## 2023-09-15 RX ADMIN — FENTANYL CITRATE 50 MCG: 50 INJECTION, SOLUTION INTRAMUSCULAR; INTRAVENOUS at 16:18

## 2023-09-15 RX ADMIN — DEXAMETHASONE SODIUM PHOSPHATE 4 MG: 4 INJECTION INTRA-ARTICULAR; INTRALESIONAL; INTRAMUSCULAR; INTRAVENOUS; SOFT TISSUE at 16:54

## 2023-09-15 RX ADMIN — ROCURONIUM BROMIDE 50 MG: 10 INJECTION, SOLUTION INTRAVENOUS at 15:51

## 2023-09-15 RX ADMIN — SODIUM CHLORIDE, POTASSIUM CHLORIDE, SODIUM LACTATE AND CALCIUM CHLORIDE: 600; 310; 30; 20 INJECTION, SOLUTION INTRAVENOUS at 20:32

## 2023-09-15 RX ADMIN — METHYLPREDNISOLONE SODIUM SUCCINATE 40 MG: 40 INJECTION INTRAMUSCULAR; INTRAVENOUS at 14:37

## 2023-09-15 RX ADMIN — ONDANSETRON 4 MG: 2 INJECTION INTRAMUSCULAR; INTRAVENOUS at 09:49

## 2023-09-15 RX ADMIN — ASPIRIN 81 MG: 81 TABLET, COATED ORAL at 09:49

## 2023-09-15 RX ADMIN — SODIUM CHLORIDE, PRESERVATIVE FREE 40 MG: 5 INJECTION INTRAVENOUS at 09:49

## 2023-09-15 RX ADMIN — SUGAMMADEX 200 MG: 100 INJECTION, SOLUTION INTRAVENOUS at 17:20

## 2023-09-15 RX ADMIN — FENTANYL CITRATE 25 MCG: 50 INJECTION, SOLUTION INTRAMUSCULAR; INTRAVENOUS at 17:08

## 2023-09-15 RX ADMIN — GLYCOPYRROLATE 0.1 MG: 0.2 INJECTION INTRAMUSCULAR; INTRAVENOUS at 13:16

## 2023-09-15 RX ADMIN — PROPOFOL 100 MG: 10 INJECTION, EMULSION INTRAVENOUS at 15:51

## 2023-09-15 RX ADMIN — DIATRIZOATE MEGLUMINE AND DIATRIZOATE SODIUM 30 ML: 660; 100 LIQUID ORAL; RECTAL at 15:11

## 2023-09-15 RX ADMIN — CEFTRIAXONE SODIUM 2000 MG: 2 INJECTION, POWDER, FOR SOLUTION INTRAMUSCULAR; INTRAVENOUS at 10:01

## 2023-09-15 RX ADMIN — METRONIDAZOLE 500 MG: 500 INJECTION, SOLUTION INTRAVENOUS at 02:25

## 2023-09-15 RX ADMIN — FENTANYL CITRATE 50 MCG: 50 INJECTION, SOLUTION INTRAMUSCULAR; INTRAVENOUS at 15:43

## 2023-09-15 RX ADMIN — LIDOCAINE HYDROCHLORIDE 40 MG: 10 INJECTION, SOLUTION EPIDURAL; INFILTRATION; INTRACAUDAL; PERINEURAL at 13:03

## 2023-09-15 ASSESSMENT — PAIN DESCRIPTION - FREQUENCY
FREQUENCY: CONTINUOUS

## 2023-09-15 ASSESSMENT — PAIN DESCRIPTION - LOCATION
LOCATION: ABDOMEN

## 2023-09-15 ASSESSMENT — PAIN - FUNCTIONAL ASSESSMENT: PAIN_FUNCTIONAL_ASSESSMENT: 0-10

## 2023-09-15 ASSESSMENT — PAIN SCALES - GENERAL
PAINLEVEL_OUTOF10: 10
PAINLEVEL_OUTOF10: 10
PAINLEVEL_OUTOF10: 8
PAINLEVEL_OUTOF10: 10
PAINLEVEL_OUTOF10: 5

## 2023-09-15 ASSESSMENT — PAIN DESCRIPTION - PAIN TYPE
TYPE: SURGICAL PAIN
TYPE: SURGICAL PAIN
TYPE: ACUTE PAIN
TYPE: SURGICAL PAIN
TYPE: SURGICAL PAIN
TYPE: ACUTE PAIN

## 2023-09-15 ASSESSMENT — PAIN DESCRIPTION - DESCRIPTORS
DESCRIPTORS: ACHING
DESCRIPTORS: ACHING

## 2023-09-15 ASSESSMENT — PAIN DESCRIPTION - ORIENTATION
ORIENTATION: MID
ORIENTATION: MID

## 2023-09-15 NOTE — PROGRESS NOTES
Spondylolysis        Past Surgical  History:     Past Surgical History:   Procedure Laterality Date    BACK SURGERY      BREAST SURGERY      JOINT REPLACEMENT      TONSILLECTOMY         Medications:      aspirin  81 mg Oral Daily    gabapentin  300 mg Oral BID    losartan  50 mg Oral Daily    pravastatin  40 mg Oral Daily    sodium chloride flush  5-40 mL IntraVENous 2 times per day    enoxaparin  40 mg SubCUTAneous Daily    cefTRIAXone (ROCEPHIN) IV  1,000 mg IntraVENous Q24H    metroNIDAZOLE  500 mg IntraVENous Q8H    pantoprazole (PROTONIX) 40 mg in sodium chloride (PF) 0.9 % 10 mL injection  40 mg IntraVENous Daily       Social History:     Social History     Socioeconomic History    Marital status: Unknown     Spouse name: Not on file    Number of children: Not on file    Years of education: Not on file    Highest education level: Not on file   Occupational History    Not on file   Tobacco Use    Smoking status: Never    Smokeless tobacco: Never   Substance and Sexual Activity    Alcohol use: Not on file    Drug use: Not on file    Sexual activity: Not on file   Other Topics Concern    Not on file   Social History Narrative    Not on file     Social Determinants of Health     Financial Resource Strain: Not on file   Food Insecurity: Not on file   Transportation Needs: Not on file   Physical Activity: Not on file   Stress: Not on file   Social Connections: Not on file   Intimate Partner Violence: Not on file   Housing Stability: Not on file       Family History:   History reviewed. No pertinent family history.    Medical Decision Making:   I have independently reviewed/ordered the following labs:    CBC with Differential:   Recent Labs     09/13/23  0549 09/14/23  0531   WBC 14.9* 10.5   HGB 8.5* 9.3*   HCT 26.5* 28.3*    200   LYMPHOPCT 4* 6*   MONOPCT 2 6       BMP:  Recent Labs     09/13/23  0549 09/14/23  0531   * 132*   K 4.9 4.4    104   CO2 20 21   BUN 14 12   CREATININE 0.8 0.8   MG 2.0 --        Hepatic Function Panel:   Recent Labs     09/12/23  0852 09/13/23  0549 09/14/23  0531   PROT 5.7*  5.7* 5.9* 6.1*   LABALBU 3.3*  3.3* 3.2* 3.2*   BILIDIR 1.1*  --   --    IBILI 0.2  --   --    BILITOT 1.3*  1.3* 1.1 0.4   ALKPHOS 59  59 66 72   *  210* 138* 90*   *  324* 113* 44*       No results for input(s): \"RPR\" in the last 72 hours. No results for input(s): \"HIV\" in the last 72 hours. No results for input(s): \"BC\" in the last 72 hours. Lab Results   Component Value Date/Time    CREATININE 0.8 09/14/2023 05:31 AM    GLUCOSE 113 09/14/2023 05:31 AM       Detailed results: Thank you for allowing us to participate in the care of this patient. Please call with questions. This note is created with the assistance of a speech recognition program.  While intending to generate adocument that actually reflects the content of the visit, the document can still have some errors including those of syntax and sound a like substitutions which may escape proof reading. It such instances, actual meaningcan be extrapolated by contextual diversion.     Christina Merida MD  Office: [phone]  Perfect serve / office [phone]

## 2023-09-15 NOTE — SIGNIFICANT EVENT
ACS Surgeon On Call    Full consult to follow. Called to bedside by Dr. Jack Savage in PACU due to concern of duodenal perforation after EGD/EUS. Xray was obtained which showed large amt of free air. The patient was hemodynamically stable and had some distention and pain in the abdomen more so on the right side. I obtained a CT w/ oral contrast to confirm extravasation. CT was reviewed with Dr. Job Leonardo in [address] [place] [address] and extravasation was confirmed. Decision made to take the patient to the OR immediately for repair of duodenal perforation. I discussed the CT findings with the patient and her son (on the phone) and they were both agreeable to proceed with operation.      Brock May MD

## 2023-09-15 NOTE — PROGRESS NOTES
Writer informed LORRAINE Jon that per Dr. Maulik Eaton, midline would have to wait. See note timed for 9803.

## 2023-09-15 NOTE — OP NOTE
PROCEDURE NOTE    DATE OF PROCEDURE: 9/15/2023     SURGEON: Yung Ryan MD  Facility: [facility]  ASSISTANT: None  Anesthesia: Monitored anesthesia care  PREOPERATIVE DIAGNOSIS: Abnormal LFT's R/o- bile duct stones    Diagnosis:    POSTOPERATIVE DIAGNOSIS: As described below    OPERATION: Upper GI endoscopy with Biopsy with EUS    ANESTHESIA: Moderate Sedation     ESTIMATED BLOOD LOSS: Less than 50 ml    COMPLICATIONS: None. SPECIMENS:  Was Obtained: gastric    HISTORY: The patient is a 82y.o. year old female with history of above preop diagnosis. I recommended esophagogastroduodenoscopy with possible biopsy and I explained the risk, benefits, expected outcome, and alternatives to the procedure. Risks included but are not limited to bleeding, infection, respiratory distress, hypotension, and perforation of the esophagus, stomach, or duodenum. Patient understands and is in agreement. PROCEDURE: The patient was given IV conscious sedation. The patient's SPO2 remained above 90% throughout the procedure. The gastroscope was inserted orally and advanced under direct vision through the esophagus, through the stomach, through the pylorus, and into the descending duodenum. Post sedation note : The patient's SPO2 remained above 90% throughout the procedure. the vital signs remained stable , and no immediate complication form the procedure noted, patient will be ready for d/c when criteria is met .       Findings:    Retropharyngeal area was grossly normal appearing    Esophagus: normal    Esophagogastric markings: Diaphragmatic hiatus- 35 cm; GE junction- 35 cm; Squamo-columnar junction- 35 cm    Stomach:    Fundus: normal    Body: normal    Antrum: abnormal: patchy erythema, biopsy obtained    Duodenum:     Descending: normal    Bulb: normal  Then, we introduced Pentax echoendoscope orally and advanced under direct vision through the esophagus, through the stomach, through the pylorus, and into the

## 2023-09-15 NOTE — PROGRESS NOTES
Writer spoke with Mindy Acosta RN regarding needing patient for midline placement. Per Dr. Mame Bonilla, who is at the bedside with patient, who is experiencing pain and needing an NG/KUB, midline will have to wait.

## 2023-09-15 NOTE — DISCHARGE INSTR - COC
Continuity of Care Form    Patient Name: Valentina Or   :  1940  MRN:  [id number]    Admit date:  2023  Discharge date:      Code Status Order: Full Code   Advance Directives:   [address] Documentation       Date/Time Healthcare Directive Type of Healthcare Directive Copy in [facility] Agent's Name Healthcare Agent's Phone Number    09/15/23 1207 Yes, patient has an advance directive for healthcare treatment Living will -- -- -- --            Admitting Physician:  Darcie Zarate MD  PCP: No primary care provider on file.     Discharging Nurse: [facility] Unit/Room#: [address]  Discharging Unit Phone Number: [phone]    Emergency Contact:   Extended Emergency Contact Information  Primary Emergency Contact: Bandar Montero Phone: [phone]  Relation: Child  Secondary Emergency Contact: [facility] Phone: [phone]  Relation: Child    Past Surgical History:  Past Surgical History:   Procedure Laterality Date    BACK SURGERY      BREAST SURGERY      JOINT REPLACEMENT      TONSILLECTOMY         Immunization History:   Immunization History   Administered Date(s) Administered    COVID-19, PFIZER Bivalent, DO NOT Dilute, (age 12y+), IM, 30 mcg/0.3 mL 09/15/2022    COVID-19, PFIZER GRAY top, DO NOT Dilute, (age 15 y+), IM, 30 mcg/0.3 mL 2022    COVID-19, PFIZER PURPLE top, DILUTE for use, (age 15 y+), 30mcg/0.3mL 2021, 2021, 10/05/2021       Active Problems:  Patient Active Problem List   Diagnosis Code    Choledocholithiasis K80.50    Benign essential HTN I10    Diabetes mellitus type 2 in nonobese ([address] [facility]) E11.9    Mixed hyperlipidemia E78.2    Acute gastroenteritis K52.9    Dilation of biliary tract K83.8    Abnormal LFTs R79.89    Right upper quadrant abdominal pain R10.11    Weight loss R63.4    Bacteremia, escherichia coli R78.81, B96.20       Isolation/Infection:   Isolation

## 2023-09-15 NOTE — CARE COORDINATION
ONGOING DISCHARGE PLAN:    Patient is alert and oriented x4. Spoke with patient regarding discharge plan and patient confirms that plan is still SNF. Waiting on choices. Post Acute Facility/Agency List     Provided patient with the following list, the list includes the overall star ratings obtained from CMS per the Medicare Web site (www.Medicare.gov):     [] [address]  [] Acute Inpatient Rehabilitation Facilities  [x] Skilled Nursing Facilities  [] Home Care    Provided verbal instructions on how to utilize the QR Code to obtain additional detailed star ratings from www. Medicare. gov     offered to print and provide the detailed list:    [x]Accepted   []Declined    PT/OT evals    Plan for EUS w/ GI today    ID consult-ecoli bacteremia IV rocephin thru 9/26, IV flagyl can be changed to oral    Will need midline    Dr Haley Shukla consulted-chest pain  Echo     Will continue to follow for additional discharge needs. If patient is discharged prior to next notation, then this note serves as note for discharge by case management.     Electronically signed by Linda Agee RN on 9/15/2023 at 9:02 AM

## 2023-09-15 NOTE — PROGRESS NOTES
Physical Therapy  Facility/Department: [facility] MED SURG  Physical Therapy Initial Assessment    Name: Tata Mahoney  : 1940  MRN: [id number]  Date of Service: 9/15/2023    Discharge Recommendations:  Patient would benefit from continued therapy after discharge, Therapy recommended at discharge   PT Equipment Recommendations  Equipment Needed:  (TBD)      Patient Diagnosis(es): The encounter diagnosis was Cardiac arrhythmia, unspecified cardiac arrhythmia type. Past Medical History:  has a past medical history of Arthritis, Cancer ([address]), Chronic back pain, Diabetes mellitus ([address]), DM (diabetes mellitus) ([address]), GERD (gastroesophageal reflux disease), Hypertension, Spinal stenosis, and Spondylolysis. Past Surgical History:  has a past surgical history that includes back surgery; Breast surgery; joint replacement; and Tonsillectomy. Assessment   Body Structures, Functions, Activity Limitations Requiring Skilled Therapeutic Intervention: Decreased functional mobility ; Decreased endurance;Decreased balance;Decreased strength  Assessment: continue per POC to maxmize potential for safe D/C  Treatment Diagnosis: impaired mobility due to weakness  Specific Instructions for Next Treatment: advance gait distance using wheeled walker, advance to 1 step and instruct in exercise program  Therapy Prognosis: Good  Decision Making: Medium Complexity  History: pt admitted due to choldocholithiasis  Exam: ROM, MMT, balance and mobility assessments  Clinical Presentation: SBA for bed mobility, CGA x 1 for transfers sit <> stand and gait using wheeled walker 10' x 2 and 18' x 2, FALL RISK due to C/O light headedness  Barriers to Learning: none  Requires PT Follow-Up: Yes  Activity Tolerance  Activity Tolerance: Patient tolerated treatment well     Plan   Physcial Therapy Plan  General Plan:  (5-7 treatments/ week)  Specific Instructions for Next Treatment: advance gait distance using wheeled walker, advance to 1 step and instruct Equipment: Shower chair, Grab bars in shower, Grab bars around toilet  Home Equipment: Ar[employer] Jackelyn, rolling, [address]  Has the patient had two or more falls in the past year or any fall with injury in the past year?: No  ADL Assistance: Independent  Homemaking Assistance: Independent  Homemaking Responsibilities: Yes  Ambulation Assistance: Independent (uses s cane)  Transfer Assistance: Independent  Active : Yes  Mode of Transportation: Car  Occupation: Retired  Type of Occupation: dietician  IADL Comments: sleeps in an adjustable  bed- has step to climb up into it  Additional Comments: dtr lives in [facility], son lives in [place]; states that she has a 80year old friend and that they help each other  Vision/Hearing  Vision  Vision: Impaired  Vision Exceptions: Wears glasses for reading  Hearing  Hearing: Within functional limits    Cognition   Orientation  Overall Orientation Status: Within Functional Limits  Orientation Level: Oriented to person;Oriented to situation;Oriented to place;Oriented to time;Oriented X4  Cognition  Overall Cognitive Status: WFL     Objective   O2 Device: None (Room air)     Observation/Palpation  Observation: peripheral IV left anterior proximal forearm  Gross Assessment  Sensation: Impaired (C/O intermittant tingling and constant numbness right hand and toes)     AROM RLE (degrees)  RLE AROM: WFL  AROM LLE (degrees)  LLE AROM : WFL  AROM RUE (degrees)  RUE General AROM: see OT for UE assessment  AROM LUE (degrees)  LUE General AROM: see OT for UE assessment  Strength RLE  Comment: grossly 3+/5  Strength LLE  Comment: grossly 3+/5  Strength RUE  Comment: see OT for UE assessment  Strength LUE  Comment: see OT for UE assessment           Bed mobility  Rolling to Left: Stand by assistance  Supine to Sit: Stand by assistance  Sit to Supine: Stand by assistance  Scooting: Stand by assistance  Bed Mobility Comments: pt dangled at the EOB w/ SBA- C/O light headedness  Transfers  Sit to

## 2023-09-15 NOTE — PROGRESS NOTES
Stairs to enter without rails  Entrance Stairs - Number of Steps: 1 step w/o rail  Entrance Stairs - Rails: None  Bathroom Shower/Tub: Tub/Shower unit, Walk-in shower, Doors, Shower chair with back (uses walk in the most)  Bathroom Toilet: Standard (w/ grab bars)  Bathroom Equipment: Shower chair, Grab bars in shower, Grab bars around toilet  Home Equipment: Shelby King, rolling, Cane  Has the patient had two or more falls in the past year or any fall with injury in the past year?: No  ADL Assistance: [address] Independent  Homemaking Responsibilities: Yes  Ambulation Assistance: Independent (uses s cane)  Transfer Assistance: Independent  Active : Yes  Mode of Transportation: Car  Occupation: Retired  Type of Occupation: dietician  IADL Comments: sleeps in an adjustable  bed- has step to climb up into it  Additional Comments: dtr lives in [facility], son lives in [place]; states that she has a 80year old friend and that they help each other    Objective  Orientation  Overall Orientation Status: Within Functional Limits  Orientation Level: Oriented to person, Oriented to situation, Oriented to place, Oriented to time, Oriented X4  Cognition  Overall Cognitive Status: WFL   Sensation  Overall Sensation Status: WFL    ADL  Feeding: NPO  Grooming: Supervision  UE Bathing: Stand by assistance  LE Bathing: Supervision  UE Dressing: Stand by assistance  LE Dressing: Stand by assistance  Toileting: Stand by assistance  Toileting Skilled Clinical Factors: Pt completed toileting/toilet hygiene and clothing mgmt with SBA for safety  Additional Comments: ADL scores are based on skilled observation and clinical reasoning unless otherwise noted.  Pt is currently limited by decreased strength, endurance, activity tolerance, and increased pain which impacts the pt's ability to safely and independently complete self-care    UE Function  LUE PROM (degrees)  LUE PROM: WFL  LUE AROM (degrees)  LUE AROM : Exceptions  LUE General AROM: 0-100 deg at shoulder, otherwise WFL  Left Hand AROM (degrees)  Left Hand AROM: WFL  Tone LUE  LUE Tone: Normotonic  LUE Strength  Gross LUE Strength: WFL  L Hand General: 4/5  LUE Strength Comment: 3+/5 at shoulder, otherwise 4/5    RUE AROM (degrees)  RUE AROM : WFL  Right Hand AROM (degrees)  Right Hand AROM: WFL  Tone RUE  RUE Tone: Normotonic  RUE Strength  Gross RUE Strength: WFL  R Hand General: 4/5  RUE Strength Comment: 3+/5 at shoulder, otherwise 4/5    Fine Motor Skills/Coordination  Coordination  Movements Are Fluid And Coordinated: Yes     Bed Mobility  Bed mobility  Rolling to Left: Stand by assistance  Supine to Sit: Stand by assistance  Sit to Supine: Stand by assistance  Scooting: Stand by assistance  Bed Mobility Comments: pt dangled at the EOB w/ SBA- C/O light headedness    Balance  Balance  Sitting Balance: Supervision  Standing Balance: Stand by assistance  Standing Balance  Time: 5-6 minutes  Activity: Functional transfers, mobility  Comment: BUE support on RW    Transfers  Transfers  Sit to stand: Stand by assistance  Stand to sit: Stand by assistance  Transfer Comments: SBA for safety, good hand placement  Toilet Transfers  Toilet - Technique: Ambulating  Equipment Used: Standard toilet  Toilet Transfer: Stand by assistance  Toilet Transfers Comments: SBA for safety, good use of GB    Functional Mobility  Functional - Mobility Device: Rolling Walker  Activity: To/from bathroom  Assist Level: Contact guard assistance  Functional Mobility Comments: CGA intially, progressed to SBA    Assessment  Assessment  Performance deficits / Impairments: Decreased functional mobility , Decreased ADL status, Decreased ROM, Decreased strength, Decreased safe awareness, Decreased endurance, Decreased balance, Decreased high-level IADLs  Treatment Diagnosis: Impaired self-care status  Prognosis: Good  Decision Making: Medium Complexity  Discharge Recommendations: Patient would

## 2023-09-15 NOTE — PROGRESS NOTES
Dr. Jonathan Barlow notified that patient was given aspirin 81 mg this morning at 0949. Ok to proceed.

## 2023-09-15 NOTE — PROGRESS NOTES
Patient was seen and examined earlier. She is awake alert in no acute distress. Afebrile vital signs are stable. Tolerating diet. No acute abdominal pain. Abdomen is soft nothing acute. Extremity mild edema. Blood work reviewed. Continue medical management. Treatment of UTI. Imaging findings blood work all reviewed. Patient can follow-up in the office with me postdischarge to discuss possible cholecystectomy on an elective basis. Liver function test is improved. Blood work all discussed with the patient at length. She expresses understanding and agrees with the current management plan. Discussed with charge nurse and the nurse taking care of the patient.

## 2023-09-15 NOTE — PROGRESS NOTES
Patient received from recovery. Monitor and SpO2 applied. VS obtained. Assessment completed. Call light within reach, bed in low position, wheels locked, alarm on.

## 2023-09-15 NOTE — BRIEF OP NOTE
Brief Postoperative Note      Patient: Amadou Crook  YOB: 1940  MRN: [id number]    Date of Procedure: 9/15/2023    Pre-Op Diagnosis Codes:     * Perforated bowel ([address]) [K63.1]    Post-Op Diagnosis: Same       Procedure(s):  LAPAROTOMY EXPLORATORY  EGD DIAGNOSTIC ONLY    Surgeon(s):  Debbie Richards MD    Assistant:  * No surgical staff found *    Anesthesia: General    Estimated Blood Loss (mL): Minimal    Complications: None    Specimens:   ID Type Source Tests Collected by Time Destination   1 : urine from babb insertion Urine Urine, indwelling catheter URINALYSIS WITH REFLEX TO CULTURE Lyssa Elizalde RN 9/15/2023 1637        Implants:  * No implants in log *      Drains:   Closed/Suction Drain Medial;Right RUQ (Active)       NG/OG/NJ/NE Tube Nasogastric 18 fr Right nostril (Active)       Urinary Catheter 09/15/23 2 Way (Active)       Findings: 1.5 cm perforation in the first portion of the duodenum.        Electronically signed by Ricardo Yang MD on 9/15/2023 at 5:13 PM

## 2023-09-15 NOTE — PROGRESS NOTES
[address] [facility] Internal Medicine  Katie Russ MD; Markell Lutz MD; Kris Clark MD; MD Polina Busby MD; Lesvia Hicks MD    [facility] Internal Medicine   [address] note            Date:   9/15/2023  Patient name:  Torsten Grewal  Date of admission:  9/12/2023  3:31 AM  MRN:   [id number]  Account:  [de-identified]  YOB: 1940  PCP:    No primary care provider on file. Room:   [facility] OR [facility]  Code Status:    Full Code    Chief Complaint:     No chief complaint on file. Abdominal pain    History Obtained From:     patient    History of Present Illness:     Torsten Grewal is a 82 y.o. Unavailable / unknown female who presents with No chief complaint on file. and is admitted to the hospital for the management of Choledocholithiasis. Past Medical History:     Past Medical History:   Diagnosis Date    Arthritis     Cancer ([facility])     Chronic back pain     Diabetes mellitus ([address] [place] [address])     DM (diabetes mellitus) ([facility])     GERD (gastroesophageal reflux disease)     Hypertension     Spinal stenosis     Spondylolysis         Past Surgical History:     Past Surgical History:   Procedure Laterality Date    BACK SURGERY      BREAST SURGERY      JOINT REPLACEMENT      TONSILLECTOMY          Medications Prior to Admission:     Prior to Admission medications    Medication Sig Start Date End Date Taking?  Authorizing Provider   Ascorbic Acid (VITAMIN C) 250 MG tablet Take 1 tablet by mouth in the morning and at bedtime   Yes Historical Provider, MD   ASPIRIN 81 PO Take 81 mg by mouth daily   Yes Historical Provider, MD   vitamin D (CHOLECALCIFEROL) 25 MCG (1000 UT) TABS tablet Take 1 tablet by mouth daily   Yes Historical Provider, MD   cyanocobalamin 1000 MCG tablet Take 1 tablet by mouth daily   Yes Historical Provider, MD   famotidine (PEPCID) 20 MG tablet Take 1 tablet by mouth 2 times daily   Yes Historical R/o infection as cause of current fatigue  Probable sinusitis  R/o pneumonia    morning, stat EKG, looks normal at this time, troponin 28, BNP greater than 5000,  Cardiology on board echocardiogram ordered,  IV fluids stopped      9/15/23    Having EUS today    Vitals ok  Hb 10.4 , wbc 6.9  Liver fx ok             Consultations:   [facility] CONSULT TO SPIRITUAL SERVICES  IP CONSULT TO GI  IP CONSULT TO CARDIOLOGY  IP CONSULT TO INFECTIOUS DISEASES     Patient is admitted as inpatient status because of co-morbidities listed above, severity of signs and symptoms as outlined, requirement for current medical therapies and most importantly because of direct risk to patient if care not provided in a hospital setting. Expected length of stay > 48 hours. Soniya Salinas MD  9/15/2023  1:08 PM    Copy sent to Dr. King [place] primary care provider on file. Please note that this chart was generated using voice recognition Dragon dictation software. Although every effort was made to ensure the accuracy of this automated transcription, some errors in transcription may have occurred.

## 2023-09-16 ENCOUNTER — APPOINTMENT (OUTPATIENT)
Dept: GENERAL RADIOLOGY | Age: 83
DRG: 981 | End: 2023-09-16
Attending: INTERNAL MEDICINE
Payer: MEDICARE

## 2023-09-16 PROBLEM — K80.00 CALCULUS OF GALLBLADDER WITH ACUTE CHOLECYSTITIS WITHOUT OBSTRUCTION: Status: ACTIVE | Noted: 2023-09-16

## 2023-09-16 PROBLEM — K63.1 PERFORATED BOWEL (HCC): Status: ACTIVE | Noted: 2023-09-16

## 2023-09-16 PROBLEM — I49.9 CARDIAC ARRHYTHMIA: Status: ACTIVE | Noted: 2023-09-16

## 2023-09-16 LAB
ALBUMIN SERPL-MCNC: 3 G/DL (ref 3.5–5.2)
ALP SERPL-CCNC: 73 U/L (ref 35–104)
ALT SERPL-CCNC: 56 U/L (ref 5–33)
ANION GAP SERPL CALCULATED.3IONS-SCNC: 10 MMOL/L (ref 9–17)
AST SERPL-CCNC: 61 U/L
BASOPHILS # BLD: 0.1 K/UL (ref 0–0.2)
BASOPHILS NFR BLD: 1 % (ref 0–2)
BILIRUB DIRECT SERPL-MCNC: 0.2 MG/DL
BILIRUB INDIRECT SERPL-MCNC: 0.1 MG/DL (ref 0–1)
BILIRUB SERPL-MCNC: 0.3 MG/DL (ref 0.3–1.2)
BUN SERPL-MCNC: 19 MG/DL (ref 8–23)
CALCIUM SERPL-MCNC: 9.3 MG/DL (ref 8.6–10.4)
CHLORIDE SERPL-SCNC: 105 MMOL/L (ref 98–107)
CO2 SERPL-SCNC: 19 MMOL/L (ref 20–31)
CREAT SERPL-MCNC: 0.8 MG/DL (ref 0.5–0.9)
EOSINOPHIL # BLD: 0 K/UL (ref 0–0.4)
EOSINOPHILS RELATIVE PERCENT: 0 % (ref 0–4)
ERYTHROCYTE [DISTWIDTH] IN BLOOD BY AUTOMATED COUNT: 13.5 % (ref 11.5–14.9)
GFR SERPL CREATININE-BSD FRML MDRD: >60 ML/MIN/1.73M2
GLUCOSE SERPL-MCNC: 120 MG/DL (ref 70–99)
HCT VFR BLD AUTO: 29.7 % (ref 36–46)
HGB BLD-MCNC: 9.5 G/DL (ref 12–16)
LYMPHOCYTES NFR BLD: 0.7 K/UL (ref 1–4.8)
LYMPHOCYTES RELATIVE PERCENT: 7 % (ref 24–44)
MCH RBC QN AUTO: 29 PG (ref 26–34)
MCHC RBC AUTO-ENTMCNC: 32 G/DL (ref 31–37)
MCV RBC AUTO: 90.8 FL (ref 80–100)
MONOCYTES NFR BLD: 0.6 K/UL (ref 0.1–1.3)
MONOCYTES NFR BLD: 6 % (ref 1–7)
NEUTROPHILS NFR BLD: 86 % (ref 36–66)
NEUTS SEG NFR BLD: 9.7 K/UL (ref 1.3–9.1)
PLATELET # BLD AUTO: 238 K/UL (ref 150–450)
PMV BLD AUTO: 8.4 FL (ref 6–12)
POTASSIUM SERPL-SCNC: 3.9 MMOL/L (ref 3.7–5.3)
PROT SERPL-MCNC: 5.9 G/DL (ref 6.4–8.3)
RBC # BLD AUTO: 3.27 M/UL (ref 4–5.2)
SODIUM SERPL-SCNC: 134 MMOL/L (ref 135–144)
WBC OTHER # BLD: 11.1 K/UL (ref 3.5–11)

## 2023-09-16 PROCEDURE — 99233 SBSQ HOSP IP/OBS HIGH 50: CPT | Performed by: INTERNAL MEDICINE

## 2023-09-16 PROCEDURE — 74018 RADEX ABDOMEN 1 VIEW: CPT

## 2023-09-16 PROCEDURE — 36415 COLL VENOUS BLD VENIPUNCTURE: CPT

## 2023-09-16 PROCEDURE — 6360000002 HC RX W HCPCS: Performed by: SURGERY

## 2023-09-16 PROCEDURE — 80048 BASIC METABOLIC PNL TOTAL CA: CPT

## 2023-09-16 PROCEDURE — 2580000003 HC RX 258: Performed by: INTERNAL MEDICINE

## 2023-09-16 PROCEDURE — 94761 N-INVAS EAR/PLS OXIMETRY MLT: CPT

## 2023-09-16 PROCEDURE — 6360000002 HC RX W HCPCS: Performed by: INTERNAL MEDICINE

## 2023-09-16 PROCEDURE — 2580000003 HC RX 258: Performed by: SURGERY

## 2023-09-16 PROCEDURE — 85025 COMPLETE CBC W/AUTO DIFF WBC: CPT

## 2023-09-16 PROCEDURE — C9113 INJ PANTOPRAZOLE SODIUM, VIA: HCPCS | Performed by: INTERNAL MEDICINE

## 2023-09-16 PROCEDURE — 99231 SBSQ HOSP IP/OBS SF/LOW 25: CPT | Performed by: INTERNAL MEDICINE

## 2023-09-16 PROCEDURE — 80076 HEPATIC FUNCTION PANEL: CPT

## 2023-09-16 PROCEDURE — 99233 SBSQ HOSP IP/OBS HIGH 50: CPT | Performed by: SURGERY

## 2023-09-16 PROCEDURE — 2700000000 HC OXYGEN THERAPY PER DAY

## 2023-09-16 PROCEDURE — 2060000000 HC ICU INTERMEDIATE R&B

## 2023-09-16 RX ORDER — HYDRALAZINE HYDROCHLORIDE 20 MG/ML
10 INJECTION INTRAMUSCULAR; INTRAVENOUS EVERY 6 HOURS PRN
Status: DISCONTINUED | OUTPATIENT
Start: 2023-09-16 | End: 2023-09-29 | Stop reason: HOSPADM

## 2023-09-16 RX ORDER — SUCRALFATE 1 G/1
1 TABLET ORAL EVERY 6 HOURS SCHEDULED
Status: DISCONTINUED | OUTPATIENT
Start: 2023-09-16 | End: 2023-09-29 | Stop reason: HOSPADM

## 2023-09-16 RX ADMIN — FENTANYL CITRATE 50 MCG: 0.05 INJECTION, SOLUTION INTRAMUSCULAR; INTRAVENOUS at 12:09

## 2023-09-16 RX ADMIN — FENTANYL CITRATE 50 MCG: 0.05 INJECTION, SOLUTION INTRAMUSCULAR; INTRAVENOUS at 09:50

## 2023-09-16 RX ADMIN — FENTANYL CITRATE 50 MCG: 0.05 INJECTION, SOLUTION INTRAMUSCULAR; INTRAVENOUS at 16:27

## 2023-09-16 RX ADMIN — FENTANYL CITRATE 50 MCG: 0.05 INJECTION, SOLUTION INTRAMUSCULAR; INTRAVENOUS at 18:30

## 2023-09-16 RX ADMIN — SODIUM CHLORIDE, POTASSIUM CHLORIDE, SODIUM LACTATE AND CALCIUM CHLORIDE: 600; 310; 30; 20 INJECTION, SOLUTION INTRAVENOUS at 09:56

## 2023-09-16 RX ADMIN — METRONIDAZOLE 500 MG: 500 INJECTION, SOLUTION INTRAVENOUS at 02:17

## 2023-09-16 RX ADMIN — SODIUM CHLORIDE, PRESERVATIVE FREE 40 MG: 5 INJECTION INTRAVENOUS at 07:46

## 2023-09-16 RX ADMIN — HYDRALAZINE HYDROCHLORIDE 10 MG: 20 INJECTION INTRAMUSCULAR; INTRAVENOUS at 17:48

## 2023-09-16 RX ADMIN — METRONIDAZOLE 500 MG: 500 INJECTION, SOLUTION INTRAVENOUS at 18:31

## 2023-09-16 RX ADMIN — SODIUM CHLORIDE, PRESERVATIVE FREE 10 ML: 5 INJECTION INTRAVENOUS at 07:45

## 2023-09-16 RX ADMIN — FENTANYL CITRATE 50 MCG: 0.05 INJECTION, SOLUTION INTRAMUSCULAR; INTRAVENOUS at 14:26

## 2023-09-16 RX ADMIN — CEFEPIME 2000 MG: 2 INJECTION, POWDER, FOR SOLUTION INTRAVENOUS at 14:27

## 2023-09-16 RX ADMIN — SODIUM CHLORIDE, POTASSIUM CHLORIDE, SODIUM LACTATE AND CALCIUM CHLORIDE: 600; 310; 30; 20 INJECTION, SOLUTION INTRAVENOUS at 17:51

## 2023-09-16 RX ADMIN — FENTANYL CITRATE 50 MCG: 0.05 INJECTION, SOLUTION INTRAMUSCULAR; INTRAVENOUS at 07:46

## 2023-09-16 RX ADMIN — METRONIDAZOLE 500 MG: 500 INJECTION, SOLUTION INTRAVENOUS at 09:52

## 2023-09-16 RX ADMIN — HYDRALAZINE HYDROCHLORIDE 10 MG: 20 INJECTION INTRAMUSCULAR; INTRAVENOUS at 09:18

## 2023-09-16 RX ADMIN — FENTANYL CITRATE 50 MCG: 0.05 INJECTION, SOLUTION INTRAMUSCULAR; INTRAVENOUS at 20:41

## 2023-09-16 ASSESSMENT — PAIN DESCRIPTION - LOCATION
LOCATION: ABDOMEN

## 2023-09-16 ASSESSMENT — PAIN SCALES - GENERAL
PAINLEVEL_OUTOF10: 8
PAINLEVEL_OUTOF10: 7
PAINLEVEL_OUTOF10: 4
PAINLEVEL_OUTOF10: 9
PAINLEVEL_OUTOF10: 7
PAINLEVEL_OUTOF10: 9
PAINLEVEL_OUTOF10: 8
PAINLEVEL_OUTOF10: 8

## 2023-09-16 ASSESSMENT — PAIN DESCRIPTION - DESCRIPTORS
DESCRIPTORS: ACHING;DISCOMFORT
DESCRIPTORS: ACHING;SHARP
DESCRIPTORS: ACHING;DULL
DESCRIPTORS: ACHING;STABBING

## 2023-09-16 ASSESSMENT — ENCOUNTER SYMPTOMS
EYES NEGATIVE: 1
RESPIRATORY NEGATIVE: 1
ABDOMINAL PAIN: 1

## 2023-09-16 NOTE — ANESTHESIA POSTPROCEDURE EVALUATION
Department of Anesthesiology  Postprocedure Note    Patient: Kennedi Barbosa  MRN: [id number]  YOB: 1940  Date of evaluation: 9/16/2023      Procedure Summary     Date: 09/15/23 Room / Location: [address] [place] [address]    Anesthesia Start: 5302 Anesthesia Stop: 2518    Procedures:       LAPAROTOMY EXPLORATORY (Abdomen)      EGD DIAGNOSTIC ONLY Diagnosis:       Perforated bowel ([address])      (Perforated bowel ([address]) [K63.1])    Surgeons: Mis Floyd MD Responsible Provider: Dale Stone MD    Anesthesia Type: general ASA Status: 3 - Emergent          Anesthesia Type: No value filed. Corry Phase I: Corry Score: 8    Corry Phase II:        Anesthesia Post Evaluation    Comments: POD #1 Patient seen sitting up in chair. Denied any anesthesia related issues.

## 2023-09-16 NOTE — PROGRESS NOTES
Patient assisted into chair with two assist. Patient dizzy when she first sat up, but recovered upon standing. Followed directions appropriately. Patient is sitting in chair comfortably with son at bedside.

## 2023-09-16 NOTE — PROGRESS NOTES
[facility] [address] -   Infectious diseases evaluation  admission date 9/12/2023    reason for consultation:   Bacteremia    Impression :   Current:  E. coli bacteremia, likely biliary source  Abdominal pain/elevated liver enzymes and biliary dilatation. Status post*upper GI endoscopy with EUS 9/15/2023  Perforated bowel status post repair of duodenal perforation 9/15/2023  Diabetes mellitus  Hypertension  Hyperlipidemia  History of breast cancer    Recommendations   IV ceftriaxone 2 g daily through 9/26/2023  IV Flagyl m through 9/26/2023  Midline was placed  Supportive care  Follow CBC and renal function    Infection Control Recommendations   [place] Precautions      Antimicrobial Stewardship Recommendations   Simplification of therapy  Targeted therapy      History of Present Illness:   Initial history:  Matthieu Mayorga is a 82y.o.-year-old female who was transferred from [facility] 9/12/2023. She presented with right upper quadrant abdominal pain for several days associated with the nausea and vomiting. Symptoms moderate to severe, no alleviating or aggravating factors. She denied urinary symptoms, denied fever or chills, no other complaints  CT abdomen pelvis completed at [facility] showed biliary duct dilatation with the common bile duct measuring 1.4 cm. Liver enzymes were elevated with ALT of 130, , WBC 12.2. Blood cultures done at [facility] grew E. coli that was sensitive to cefazolin and ceftriaxone. Gallbladder ultrasound showed gallbladder sludge with dilated common bile duct  GI consulted, MRCP was completed but limited due to motion artifact. Plan for EUS on 9/15/2023  Interval changes  9/16/2023   Afebrile, had EUS yesterday, was found to have perforated bowel status post duodenal perforation repair 9/15/2023.   She is at the ICU, NG tube in place, complaining of abdominal discomfort, ELENI drain in place with Never    Smokeless tobacco: Never   Substance and Sexual Activity    Alcohol use: Not on file    Drug use: Not on file    Sexual activity: Not on file   Other Topics Concern    Not on file   Social History Narrative    Not on file     Social Determinants of Health     Financial Resource Strain: Not on file   Food Insecurity: Not on file   Transportation Needs: Not on file   Physical Activity: Not on file   Stress: Not on file   Social Connections: Not on file   Intimate Partner Violence: Not on file   Housing Stability: Not on file       Family History:   History reviewed. No pertinent family history. Medical Decision Making:   I have independently reviewed/ordered the following labs:    CBC with Differential:   Recent Labs     09/15/23  0907 09/16/23 0438   WBC 6.9 11.1*   HGB 10.4* 9.5*   HCT 31.9* 29.7*    238   LYMPHOPCT 15* 7*   MONOPCT 12* 6       BMP:  Recent Labs     09/15/23  0907 09/16/23 0438   * 134*   K 4.6 3.9    105   CO2 22 19*   BUN 16 19   CREATININE 0.9 0.8       Hepatic Function Panel:   Recent Labs     09/15/23  0907 09/16/23 0438   PROT 6.5 5.9*   LABALBU 3.4* 3.0*   BILIDIR 0.2 0.2   IBILI 0.2 0.1   BILITOT 0.4 0.3   ALKPHOS 81 73   ALT 60* 56*   AST 22 61*       No results for input(s): \"RPR\" in the last 72 hours. No results for input(s): \"HIV\" in the last 72 hours. No results for input(s): \"BC\" in the last 72 hours. Lab Results   Component Value Date/Time    CREATININE 0.8 09/16/2023 04:38 AM    GLUCOSE 120 09/16/2023 04:38 AM       Detailed results: Thank you for allowing us to participate in the care of this patient. Please call with questions. This note is created with the assistance of a speech recognition program.  While intending to generate adocument that actually reflects the content of the visit, the document can still have some errors including those of syntax and sound a like substitutions which may escape proof reading.   It such instances,

## 2023-09-16 NOTE — PROGRESS NOTES
NP with GI notified about strict NPO from surgery team, and writer will not be able to administer Carafate.

## 2023-09-16 NOTE — PROGRESS NOTES
[address] [facility] Internal Medicine  [facility] Internal Medicine  Randell Fischer MD; Britney Vang MD; Norberto Salgado MD; MD Mathew Raza MD; Froy Faith MD; Reuben Dunn MD        Progress Note    9/16/2023    9:23 AM    Name:   Sahara Polanco  MRN:     [id number]     Acct:      [de-identified]   Room:   2003/2003-01  IP Day:  4  Admit Date:  9/12/2023  3:31 AM    PCP:   No primary care provider on file. Code Status:  Full Code    Subjective:     C/C: No chief complaint on file. Abdominal pain      Interval History Status: Improving    HPI:     Initially admitted for management of abdominal pain, noted to have choledocholithiasis    Review of Systems:     Denies any shortness of breath or cough  Denies chest pain or palpitations  Appropriate postsurgical abdominal pain, no vomiting  Denies any new numbness tremors or weakness. Medications: Allergies:     Allergies   Allergen Reactions    Ace Inhibitors Nausea Only    Mixed Ragweed     Sulfa Antibiotics Nausea Only    Contrast [Iodides] Nausea And Vomiting    Pcn [Penicillins] Rash       Current Meds:   Scheduled Meds:    cefepime  2,000 mg IntraVENous Q24H    [Held by provider] aspirin  81 mg Oral Daily    [Held by provider] gabapentin  300 mg Oral BID    [Held by provider] losartan  50 mg Oral Daily    [Held by provider] pravastatin  40 mg Oral Daily    sodium chloride flush  5-40 mL IntraVENous 2 times per day    [Held by provider] enoxaparin  40 mg SubCUTAneous Daily    metroNIDAZOLE  500 mg IntraVENous Q8H    pantoprazole (PROTONIX) 40 mg in sodium chloride (PF) 0.9 % 10 mL injection  40 mg IntraVENous Daily     Continuous Infusions:    lactated ringers IV soln 125 mL/hr at 09/15/23 2032    sodium chloride       PRN Meds: hydrALAZINE, diatrizoate meglumine-sodium, fentanNYL, ondansetron, sodium chloride flush, sodium chloride, potassium chloride **OR** potassium alternative oral replacement **OR** abdominal binder and drains present, appropriate postsurgical tenderness,   Extremities:  no edema, redness, tenderness in the calves  Skin:  no gross lesions, rashes, induration  Neuro:  Alert, oriented X 3, no new focal weakness  Assessment:        Primary Problem  Choledocholithiasis    Active Hospital Problems    Diagnosis Date Noted    Bacteremia, escherichia coli [R78.81, B96.20] 09/13/2023    Choledocholithiasis [K80.50] 09/12/2023    Benign essential HTN [I10] 09/12/2023    Diabetes mellitus type 2 in nonobese ([address]) [E11.9] 09/12/2023    Mixed hyperlipidemia [E78.2] 09/12/2023    Acute gastroenteritis [K52.9] 09/12/2023    Dilation of biliary tract [K83.8] 09/12/2023    Abnormal LFTs [R79.89] 09/12/2023    Right upper quadrant abdominal pain [R10.11] 09/12/2023    Weight loss [R63.4] 09/12/2023           DVT prophylaxis: Mechanical only  Antibiotics: Cefepime and Flagyl    Plan:        56-year-old female initially admitted with acute abdominal pain likely due to choledocholithiasis, 1.4 cm CBD dilatation, elevated LFT, ultrasound no cholecystitis, MRCP unremarkable    Also treated for gastroenteritis-Rocephin and Flagyl started  Type 2 diabetes  HTN, HLD  Iron deficiency anemia with very low iron  Noted to have E. coli bacteremia ID was consulted likely biliary source    On 9/14 she developed chest pain, SVT, cardiology was consulted  EUS on 9/15-concern for duodenal perforation General surgery consulted, perforation confirmed on CT abdomen and emergent laparotomy was done on 9/15    9/16  Patient is on 3 L nasal cannula oxygen  She is awake alert oriented cooperative with exam  Blood pressure on the high side-as needed hydralazine added  Remains n.p.o., LFTs have downtrended since admission  Urine output has been good  Blood sugar controlled  Hemoglobin overall stable  Patient currently on cefepime and Flagyl via PICC line all oral medications have been held  Plan is for n.p.o. until Monday probably repeat

## 2023-09-16 NOTE — PROGRESS NOTES
Writer called patient's son at [phone] and informed him that patient will be moving to PCU room 1094.

## 2023-09-16 NOTE — PROGRESS NOTES
SURGERY     PLEASE HOLD ALL ORAL MEDICATIONS. PATIENT IS STRICT NPO. NGT IS TO REMAIN TO LIS. DO NO GIVE ANY PO.     Shola Horn MD

## 2023-09-16 NOTE — PLAN OF CARE
Problem: Discharge Planning  Goal: Discharge to home or other facility with appropriate resources  Outcome: Progressing  Flowsheets (Taken 9/16/2023 1200)  Discharge to home or other facility with appropriate resources:   Identify barriers to discharge with patient and caregiver   Arrange for needed discharge resources and transportation as appropriate     Problem: Safety - Adult  Goal: Free from fall injury  Outcome: Progressing  Flowsheets (Taken 9/16/2023 0908)  Free From Fall Injury: Instruct family/caregiver on patient safety     Problem: Pain  Goal: Verbalizes/displays adequate comfort level or baseline comfort level  Outcome: Progressing     Problem: Chronic Conditions and Co-morbidities  Goal: Patient's chronic conditions and co-morbidity symptoms are monitored and maintained or improved  Outcome: Progressing  Flowsheets (Taken 9/16/2023 1200)  Care Plan - Patient's Chronic Conditions and Co-Morbidity Symptoms are Monitored and Maintained or Improved:   Monitor and assess patient's chronic conditions and comorbid symptoms for stability, deterioration, or improvement   Collaborate with multidisciplinary team to address chronic and comorbid conditions and prevent exacerbation or deterioration   Update acute care plan with appropriate goals if chronic or comorbid symptoms are exacerbated and prevent overall improvement and discharge     Problem: Skin/Tissue Integrity  Goal: Absence of new skin breakdown  Description: 1.   Monitor for areas of redness and/or skin breakdown  Problem: ABCDS Injury Assessment  Goal: Absence of physical injury  Outcome: Progressing     Outcome: Progressing

## 2023-09-16 NOTE — PROGRESS NOTES
Secure message sent to Dr. Shalini Ngo (surgery) regarding minimal output from NGT. KUB prior to surgery suggested advancement, writer was told NG was advanced while in the OR. Writer rec'd order for repeat KUB. no constipation/no abdominal pain/no diarrhea/no vomiting/no nausea no vomiting/no diarrhea/no nausea/no constipation/no abdominal pain/abdominal hernia

## 2023-09-16 NOTE — PROGRESS NOTES
Dr. Christy Mosquera notified of elevated BP, patient is normally on PO cozaar. Patient is strict NPO. Orders rec'd for PRN hydralazine for SBP greater than 160.

## 2023-09-16 NOTE — PROGRESS NOTES
Dr. Raiza Cosme at bedside to see patient, dressing to be changed tomorrow. Patient's babb to removed and patient up to chair today per MD. Keep NGT to LIS. Okay with starting DVT prophylaxis from surgery standpoint.

## 2023-09-16 NOTE — PROGRESS NOTES
Date:   9/16/2023  Patient name: Monica Serna  Date of admission:  9/12/2023  3:31 AM  MRN:   [id number]  YOB: 1940  PCP: No primary care provider on file. Reason for Admission: Choledocholithiasis [K80.50]    Cardiology follow-up:       Referring physician: Dr Rhonda Oliver  Admission 9/12/23 with abdominal pain, fever and chills abnormal ALT AST, mildly dilated CBD  E. coli bacteremia, likely biliary source  9/15/2023 exploratory laparotomy, repair of duodenal perforation, EGD, drain placement  Elevated proBNP, borderline abnormal high-sensitivity troponin  Anemia, severe iron deficiency  Hypertension  Hyperlipidemia  Chronic back pain  Severe iron deficiency with anemia  History of breast cancer status post surgery     Investigation work-up     ECG 9/14/2023 -12:30 PM  Rhythm, no ischemic ECG changes        MRI abdomen without contrast  Mild prominence of the common bile duct without intrahepatic biliary dilatation or convincing evidence for choledocholithiasis  Gallbladder ultrasound 9/12/2023  Borderline dilated common bile duct small right pleural effusion        2D echo 9/14/2023  Normal LV size mild LVH normal wall motion ejection fraction more than 55%  Grade 1 LV diastolic dysfunction  No significant valvular abnormality noted  No pericardial effusion  Normal IVC diameter and respiratory variation    History of present illness     82years old female with a past medical history of pretension, hyperlipidemia, breast cancer status postsurgery, gastroesophageal reflux disease sleep presented to [facility] with abdominal pain. Her symptoms started about a week prior to the admission. She felt better after avoiding fatty food. She had a relapse of symptoms again a day before admission that prompted ER visit.   CT abdomen showed 1.4 cm CBD dilation that prompted her transport to [facility].  She has elevated AST and ALT however alkaline phosphatase and

## 2023-09-16 NOTE — CONSULTS
General Surgery  Consult    PATIENT NAME: Jatin Shelby  AGE: 82 y.o. MEDICAL RECORD NO. [id number]  DATE: 9/16/2023  SURGEON: Anthony Smith  PRIMARY CARE PHYSICIAN: No primary care provider on file. Patient evaluated at the request of  Dr. Elissa Triplett  Reason for evaluation: duodenal injury    Patient information was obtained from patient. History/Exam limitations: none. IMPRESSION:     Patient Active Problem List   Diagnosis    Choledocholithiasis    Benign essential HTN    Diabetes mellitus type 2 in nonobese [facility])    Mixed hyperlipidemia    Acute gastroenteritis    Dilation of biliary tract    Abnormal LFTs    Right upper quadrant abdominal pain    Weight loss    Bacteremia, escherichia coli   81 yo F with duodenal perforation now repaired. POD 1 from repair of duodenal perforation. PLAN:     Keep patient strict NPO. Continue NGT, IVF. Remove babb today. Continue IV abx for 4 days. Monitor ELENI drain output. Up to chair and ambulate today. Keep dressing clear and dry. Plan for Upper GI on Monday morning. HISTORY:   History of Chief Complaint:    Jatin Shelby is a 82 y.o. female who presented with concern for choledocholithiasis. She underwent an EGD EUS yesterday with resulting duodenal injury. I was called for immediate evaluation in PACU. CT with PO contrast was obtained immediately which showed active extravasation. She was taken emergently to the operating room for repair where I repaired the duodenal perforation and left a ELENI drain. EGD was performed after repair which showed negative air leak confirming robust repair. The patient was placed in ICU last night for closer monitoring. Today she is doing well. She states her abdominal pain is improved. No nausea or vomiting. Just complaining of some dry mouth. I spoke with her son yesterday and today and explained to him her clinical status.        Past Medical History   has a past medical history of Arthritis, Cancer ([address]), Chronic back

## 2023-09-16 NOTE — PROGRESS NOTES
09/16/23 1137   Encounter Summary   Encounter Overview/Reason  Attempted Encounter  (Pt with staff)   Service Provided For: Patient   Referral/Consult From: Rounding   Complexity of Encounter Low   Spiritual/Emotional needs   Type Spiritual Support   Assessment/Intervention/Outcome   Assessment Unable to assess   Intervention Prayer (assurance of)/[place]

## 2023-09-16 NOTE — PROGRESS NOTES
Patient has lost IV access due to infiltration. Multiple RNs at bedside, including house supervisor trying for IV. Secure message sent to Gaye Sahu NP regarding issue. Orders entered for PICC line placement. Access RN en route.

## 2023-09-16 NOTE — OP NOTE
Operative Note      Patient: Nyla Underwood  YOB: 1940  MRN: [id number]    Date of Procedure: 9/15/2023    Pre-Op Diagnosis Codes:     * Perforated bowel ([address]) [K63.1]    Post-Op Diagnosis: Same       OPERATION  1. Exploratory Laparotomy  2. Repair of duodenal perforation  3. EGD  4. Drain placement    Surgeon(s):  Salma Lopez MD    Assistant:   * No surgical staff found *    Anesthesia: General    Estimated Blood Loss (mL): Minimal    Complications: None    Specimens:   ID Type Source Tests Collected by Time Destination   1 : urine from babb insertion Urine Urine, indwelling catheter URINALYSIS WITH REFLEX TO CULTURE Denae Johnson RN 9/15/2023 1637        Implants:  * No implants in log *      Drains:   Closed/Suction Drain Medial;Right RUQ (Active)   Site Description Clean, dry & intact 09/16/23 0800   Dressing Status Clean, dry & intact 09/16/23 0800   Drainage Appearance Bloody 09/16/23 0800   Drain Status To bulb suction 09/16/23 0800   Output (ml) 90 ml 09/16/23 0628       NG/OG/NJ/NE Tube Nasogastric 18 fr Right nostril (Active)   Surrounding Skin Clean, dry & intact 09/16/23 0800   Securement device Adhesive based gudino 09/16/23 0800   Status Suction-low intermittent 09/16/23 0800   Placement Verified Gastric Contents 09/16/23 0800   Action Taken Tubing changed;Retaped 09/16/23 0800       Urinary Catheter 09/15/23 2 Way (Active)   Catheter Indications Perioperative use for selected surgical procedures 09/16/23 0800   Site Assessment No urethral drainage 09/16/23 0800   Urine Color Yellow 09/16/23 0800   Urine Appearance Clear 09/16/23 0800   Collection Container Standard 09/16/23 0800   Securement Method Leg strap 09/16/23 0800   Catheter Care  Soap and water 09/16/23 0800   Catheter Best Practices  Drainage tube clipped to bed;Catheter secured to thigh; Bag below bladder;Bag not on floor; Lack of dependent loop in tubing;Drainage bag less than half full 09/16/23 0800   Status

## 2023-09-16 NOTE — PROGRESS NOTES
General Surgery Note    Notes reviewed with surgical events from yesterday are noted. Patient taken for exploratory surgery for duodenal perforation and bleeding by Dr. Lisa Arechiga. Patient was being followed by Dr. Rick Muro for choledocholithiasis with plans for outpatient elective cholecystectomy. Since patient has acquired a new surgeon, we will sign off and defer future care to Dr. Lisa Arechiga.      - Electronically signed by Evans Krishna MD, FACS  at 9/16/2023 2:05 PM

## 2023-09-17 LAB
ABSOLUTE BANDS #: 0.29 K/UL (ref 0–1)
ALBUMIN SERPL-MCNC: 3.1 G/DL (ref 3.5–5.2)
ALP SERPL-CCNC: 64 U/L (ref 35–104)
ALT SERPL-CCNC: 46 U/L (ref 5–33)
ANA SER QL IA: NEGATIVE
ANION GAP SERPL CALCULATED.3IONS-SCNC: 10 MMOL/L (ref 9–17)
AST SERPL-CCNC: 38 U/L
BANDS: 2 % (ref 0–10)
BASOPHILS # BLD: 0 K/UL (ref 0–0.2)
BASOPHILS NFR BLD: 0 % (ref 0–2)
BILIRUB DIRECT SERPL-MCNC: 0.1 MG/DL
BILIRUB INDIRECT SERPL-MCNC: 0.2 MG/DL (ref 0–1)
BILIRUB SERPL-MCNC: 0.3 MG/DL (ref 0.3–1.2)
BUN SERPL-MCNC: 22 MG/DL (ref 8–23)
CALCIUM SERPL-MCNC: 9.4 MG/DL (ref 8.6–10.4)
CHLORIDE SERPL-SCNC: 104 MMOL/L (ref 98–107)
CO2 SERPL-SCNC: 20 MMOL/L (ref 20–31)
CREAT SERPL-MCNC: 0.7 MG/DL (ref 0.5–0.9)
DSDNA IGG SER QL IA: <0.5 IU/ML
EOSINOPHIL # BLD: 0.15 K/UL (ref 0–0.4)
EOSINOPHILS RELATIVE PERCENT: 1 % (ref 0–4)
ERYTHROCYTE [DISTWIDTH] IN BLOOD BY AUTOMATED COUNT: 13.7 % (ref 11.5–14.9)
GFR SERPL CREATININE-BSD FRML MDRD: >60 ML/MIN/1.73M2
GLUCOSE SERPL-MCNC: 109 MG/DL (ref 70–99)
HCT VFR BLD AUTO: 28.7 % (ref 36–46)
HGB BLD-MCNC: 9.1 G/DL (ref 12–16)
LYMPHOCYTES NFR BLD: 1.61 K/UL (ref 1–4.8)
LYMPHOCYTES RELATIVE PERCENT: 11 % (ref 24–44)
MCH RBC QN AUTO: 28.9 PG (ref 26–34)
MCHC RBC AUTO-ENTMCNC: 31.8 G/DL (ref 31–37)
MCV RBC AUTO: 90.8 FL (ref 80–100)
METAMYELOCYTES ABSOLUTE COUNT: 0.15 K/UL
METAMYELOCYTES: 1 %
MITOCHONDRIA M2 IGG SER-ACNC: 1.3 U/ML (ref 0–4)
MONOCYTES NFR BLD: 0.58 K/UL (ref 0.1–1.3)
MONOCYTES NFR BLD: 4 % (ref 1–7)
MORPHOLOGY: NORMAL
MYELOCYTES ABSOLUTE COUNT: 0.15 K/UL
MYELOCYTES: 1 %
NEUTROPHILS NFR BLD: 80 % (ref 36–66)
NEUTS SEG NFR BLD: 11.67 K/UL (ref 1.3–9.1)
NUCLEAR IGG SER IA-RTO: 0.1 U/ML
PLATELET # BLD AUTO: 269 K/UL (ref 150–450)
PMV BLD AUTO: 8.1 FL (ref 6–12)
POTASSIUM SERPL-SCNC: 3.8 MMOL/L (ref 3.7–5.3)
PROT SERPL-MCNC: 5.6 G/DL (ref 6.4–8.3)
RBC # BLD AUTO: 3.16 M/UL (ref 4–5.2)
SODIUM SERPL-SCNC: 134 MMOL/L (ref 135–144)
WBC OTHER # BLD: 14.6 K/UL (ref 3.5–11)

## 2023-09-17 PROCEDURE — 2580000003 HC RX 258: Performed by: INTERNAL MEDICINE

## 2023-09-17 PROCEDURE — 6360000002 HC RX W HCPCS: Performed by: SURGERY

## 2023-09-17 PROCEDURE — 6360000002 HC RX W HCPCS: Performed by: INTERNAL MEDICINE

## 2023-09-17 PROCEDURE — 80048 BASIC METABOLIC PNL TOTAL CA: CPT

## 2023-09-17 PROCEDURE — 6360000002 HC RX W HCPCS: Performed by: NURSE PRACTITIONER

## 2023-09-17 PROCEDURE — 36415 COLL VENOUS BLD VENIPUNCTURE: CPT

## 2023-09-17 PROCEDURE — 99232 SBSQ HOSP IP/OBS MODERATE 35: CPT | Performed by: INTERNAL MEDICINE

## 2023-09-17 PROCEDURE — C9113 INJ PANTOPRAZOLE SODIUM, VIA: HCPCS | Performed by: INTERNAL MEDICINE

## 2023-09-17 PROCEDURE — 80076 HEPATIC FUNCTION PANEL: CPT

## 2023-09-17 PROCEDURE — 2580000003 HC RX 258: Performed by: SURGERY

## 2023-09-17 PROCEDURE — 99024 POSTOP FOLLOW-UP VISIT: CPT | Performed by: SURGERY

## 2023-09-17 PROCEDURE — 2060000000 HC ICU INTERMEDIATE R&B

## 2023-09-17 PROCEDURE — 99233 SBSQ HOSP IP/OBS HIGH 50: CPT | Performed by: INTERNAL MEDICINE

## 2023-09-17 PROCEDURE — A4216 STERILE WATER/SALINE, 10 ML: HCPCS | Performed by: INTERNAL MEDICINE

## 2023-09-17 PROCEDURE — 99231 SBSQ HOSP IP/OBS SF/LOW 25: CPT | Performed by: INTERNAL MEDICINE

## 2023-09-17 PROCEDURE — 2500000003 HC RX 250 WO HCPCS: Performed by: INTERNAL MEDICINE

## 2023-09-17 PROCEDURE — 85025 COMPLETE CBC W/AUTO DIFF WBC: CPT

## 2023-09-17 RX ORDER — METOPROLOL TARTRATE 5 MG/5ML
5 INJECTION INTRAVENOUS EVERY 6 HOURS
Status: DISCONTINUED | OUTPATIENT
Start: 2023-09-17 | End: 2023-09-17

## 2023-09-17 RX ORDER — METOPROLOL TARTRATE 5 MG/5ML
5 INJECTION INTRAVENOUS EVERY 6 HOURS PRN
Status: DISCONTINUED | OUTPATIENT
Start: 2023-09-17 | End: 2023-09-19

## 2023-09-17 RX ORDER — DEXTROSE AND SODIUM CHLORIDE 5; .45 G/100ML; G/100ML
INJECTION, SOLUTION INTRAVENOUS CONTINUOUS
Status: DISCONTINUED | OUTPATIENT
Start: 2023-09-17 | End: 2023-09-19

## 2023-09-17 RX ADMIN — FENTANYL CITRATE 50 MCG: 0.05 INJECTION, SOLUTION INTRAMUSCULAR; INTRAVENOUS at 13:53

## 2023-09-17 RX ADMIN — METRONIDAZOLE 500 MG: 500 INJECTION, SOLUTION INTRAVENOUS at 02:11

## 2023-09-17 RX ADMIN — FENTANYL CITRATE 50 MCG: 0.05 INJECTION, SOLUTION INTRAMUSCULAR; INTRAVENOUS at 22:37

## 2023-09-17 RX ADMIN — SODIUM CHLORIDE, PRESERVATIVE FREE 40 MG: 5 INJECTION INTRAVENOUS at 07:53

## 2023-09-17 RX ADMIN — FENTANYL CITRATE 50 MCG: 0.05 INJECTION, SOLUTION INTRAMUSCULAR; INTRAVENOUS at 17:13

## 2023-09-17 RX ADMIN — HYDRALAZINE HYDROCHLORIDE 10 MG: 20 INJECTION INTRAMUSCULAR; INTRAVENOUS at 13:48

## 2023-09-17 RX ADMIN — METRONIDAZOLE 500 MG: 500 INJECTION, SOLUTION INTRAVENOUS at 18:27

## 2023-09-17 RX ADMIN — SODIUM CHLORIDE, POTASSIUM CHLORIDE, SODIUM LACTATE AND CALCIUM CHLORIDE: 600; 310; 30; 20 INJECTION, SOLUTION INTRAVENOUS at 08:05

## 2023-09-17 RX ADMIN — SODIUM CHLORIDE, PRESERVATIVE FREE 10 ML: 5 INJECTION INTRAVENOUS at 20:08

## 2023-09-17 RX ADMIN — FENTANYL CITRATE 50 MCG: 0.05 INJECTION, SOLUTION INTRAMUSCULAR; INTRAVENOUS at 07:53

## 2023-09-17 RX ADMIN — METRONIDAZOLE 500 MG: 500 INJECTION, SOLUTION INTRAVENOUS at 10:09

## 2023-09-17 RX ADMIN — HYDRALAZINE HYDROCHLORIDE 10 MG: 20 INJECTION INTRAMUSCULAR; INTRAVENOUS at 00:41

## 2023-09-17 RX ADMIN — FENTANYL CITRATE 50 MCG: 0.05 INJECTION, SOLUTION INTRAMUSCULAR; INTRAVENOUS at 20:07

## 2023-09-17 RX ADMIN — CEFEPIME 2000 MG: 2 INJECTION, POWDER, FOR SOLUTION INTRAVENOUS at 14:24

## 2023-09-17 RX ADMIN — METOPROLOL TARTRATE 5 MG: 5 INJECTION, SOLUTION INTRAVENOUS at 20:48

## 2023-09-17 RX ADMIN — DEXTROSE AND SODIUM CHLORIDE: 5; 450 INJECTION, SOLUTION INTRAVENOUS at 13:52

## 2023-09-17 RX ADMIN — ENOXAPARIN SODIUM 40 MG: 100 INJECTION SUBCUTANEOUS at 13:49

## 2023-09-17 RX ADMIN — ONDANSETRON 4 MG: 2 INJECTION INTRAMUSCULAR; INTRAVENOUS at 07:53

## 2023-09-17 RX ADMIN — SODIUM CHLORIDE, POTASSIUM CHLORIDE, SODIUM LACTATE AND CALCIUM CHLORIDE: 600; 310; 30; 20 INJECTION, SOLUTION INTRAVENOUS at 00:40

## 2023-09-17 RX ADMIN — FENTANYL CITRATE 50 MCG: 0.05 INJECTION, SOLUTION INTRAMUSCULAR; INTRAVENOUS at 03:27

## 2023-09-17 RX ADMIN — FENTANYL CITRATE 50 MCG: 0.05 INJECTION, SOLUTION INTRAMUSCULAR; INTRAVENOUS at 00:41

## 2023-09-17 RX ADMIN — FENTANYL CITRATE 50 MCG: 0.05 INJECTION, SOLUTION INTRAMUSCULAR; INTRAVENOUS at 10:13

## 2023-09-17 ASSESSMENT — PAIN SCALES - GENERAL
PAINLEVEL_OUTOF10: 5
PAINLEVEL_OUTOF10: 7
PAINLEVEL_OUTOF10: 9
PAINLEVEL_OUTOF10: 6
PAINLEVEL_OUTOF10: 7
PAINLEVEL_OUTOF10: 8
PAINLEVEL_OUTOF10: 7
PAINLEVEL_OUTOF10: 4
PAINLEVEL_OUTOF10: 8
PAINLEVEL_OUTOF10: 8
PAINLEVEL_OUTOF10: 6
PAINLEVEL_OUTOF10: 7
PAINLEVEL_OUTOF10: 3

## 2023-09-17 ASSESSMENT — PAIN DESCRIPTION - LOCATION
LOCATION: ABDOMEN
LOCATION: ABDOMEN;HEAD
LOCATION: ABDOMEN
LOCATION: ABDOMEN

## 2023-09-17 ASSESSMENT — PAIN DESCRIPTION - DESCRIPTORS
DESCRIPTORS: SHARP
DESCRIPTORS: ACHING;THROBBING
DESCRIPTORS: SHARP
DESCRIPTORS: THROBBING;SHOOTING
DESCRIPTORS: ACHING;THROBBING
DESCRIPTORS: THROBBING

## 2023-09-17 NOTE — PLAN OF CARE
Problem: Discharge Planning  Goal: Discharge to home or other facility with appropriate resources  9/17/2023 1542 by Elton Smith RN  Outcome: Progressing     Problem: Safety - Adult  Goal: Free from fall injury  9/17/2023 1542 by Elton Smith RN  Outcome: Progressing     Problem: Pain  Goal: Verbalizes/displays adequate comfort level or baseline comfort level  9/17/2023 1542 by Elton Smith RN  Outcome: Progressing  Note: Patient has called for pain meds 3 times during shift     Problem: Chronic Conditions and Co-morbidities  Goal: Patient's chronic conditions and co-morbidity symptoms are monitored and maintained or improved  Outcome: Progressing     Problem: ABCDS Injury Assessment  Goal: Absence of physical injury  Outcome: Progressing

## 2023-09-17 NOTE — CARE COORDINATION
ONGOING DISCHARGE PLAN:    Patient is alert and oriented x4. Spoke with patient regarding discharge plan and patient confirms that plan is still SNF. She would like [facility] of [address] [place] [address]. Referral sent. Pt's son, Sierra Jerry, at bedside and agreeable. NG to LIWS. S/P exp lap with repair of duodenal perf 9/15. Will continue to follow for additional discharge needs. If patient is discharged prior to next notation, then this note serves as note for discharge by case management.     Electronically signed by Tiffany Floyd RN on 9/17/2023 at 4:08 PM

## 2023-09-17 NOTE — PROGRESS NOTES
Date:   9/17/2023  Patient name: Damaso Brenner  Date of admission:  9/12/2023  3:31 AM  MRN:   [id number]  YOB: 1940  PCP: No primary care provider on file. Reason for Admission: Choledocholithiasis [K80.50]    Cardiology follow-up       Referring physician: Dr Nicole Harper  Admission 9/12/23 with abdominal pain, fever and chills abnormal ALT AST, mildly dilated CBD  E. coli bacteremia, likely biliary source  9/15/2023 exploratory laparotomy, repair of duodenal perforation, EGD, drain placement  Gallbladder sludge  Elevated proBNP, borderline abnormal high-sensitivity troponin  Anemia, severe iron deficiency  Hypertension  Hyperlipidemia  Chronic back pain  Severe iron deficiency with anemia  History of breast cancer status post surgery     Investigation work-up     ECG 9/14/2023 -12:30 PM  Rhythm, no ischemic ECG changes    MRI abdomen without contrast  Mild prominence of the common bile duct without intrahepatic biliary dilatation or convincing evidence for choledocholithiasis  Gallbladder ultrasound 9/12/2023  Borderline dilated common bile duct small right pleural effusion        2D echo 9/14/2023  Normal LV size mild LVH normal wall motion ejection fraction more than 55%  Grade 1 LV diastolic dysfunction  No significant valvular abnormality noted  No pericardial effusion  Normal IVC diameter and respiratory variation     History of present illness     82years old female with a past medical history of pretension, hyperlipidemia, breast cancer status postsurgery, gastroesophageal reflux disease sleep presented to [facility] with abdominal pain. Her symptoms started about a week prior to the admission. She felt better after avoiding fatty food. She had a relapse of symptoms again a day before admission that prompted ER visit.   CT abdomen showed 1.4 cm CBD dilation that prompted her transport to [facility].  She has elevated AST and ALT however alkaline phosphatase and bilirubin normal.  Patient also reported having chills and fever with a temperature of 101-102. Further history examination she has been experiencing tiredness and shortness of breath for many weeks. Has no previous history of coronary intervention. Early this morning she had lower chest and upper abdominal discomfort like a very heavy pressure which lasted for about half an hour. No nausea no diaphoresis no palpitation.      Lab work on admission  Sodium 130, potassium 5.3, BUN 23, creatinine 1.0, glucose 172  Albumin 3.3, alkaline phosphatase 59, , , bilirubin 1.3  WBC 15.9, hemoglobin 8.6, MCV 90.9, neutrophils 68, platelets 444  Ferritin 238, iron 13, saturation 5, TIBC 237, B12 850, INR 1.1     Current evaluation  Patient seen and examined  Patient's son in the room  She has NG tube  She is complaining abdominal pain  She also had a dizziness on sitting  She is afebrile  ECG monitor sinus rhythm  Abdominal bandage noted generalized mild tenderness bowel sounds present  No peripheral edema      Blood pressure 156/64, heart rate 85, temperature 98.3, oxygen saturation 97%    Sodium 134, potassium 3.8, BUN 22, creatinine 0.7, glucose 109, albumin 3.1  WBC 14.6, hemoglobin 9.1, platelets 565  Medications:   Scheduled Meds:   [Held by provider] sucralfate  1 g Oral 4 times per day    cefepime  2,000 mg IntraVENous Q24H    [Held by provider] aspirin  81 mg Oral Daily    [Held by provider] gabapentin  300 mg Oral BID    [Held by provider] losartan  50 mg Oral Daily    [Held by provider] pravastatin  40 mg Oral Daily    sodium chloride flush  5-40 mL IntraVENous 2 times per day    [Held by provider] enoxaparin  40 mg SubCUTAneous Daily    metroNIDAZOLE  500 mg IntraVENous Q8H    pantoprazole (PROTONIX) 40 mg in sodium chloride (PF) 0.9 % 10 mL injection  40 mg IntraVENous Daily     Continuous Infusions:   lactated ringers IV soln 125 mL/hr at 09/17/23 0805    sodium chloride       CBC:

## 2023-09-17 NOTE — PROGRESS NOTES
[facility] -   Infectious diseases evaluation  admission date 9/12/2023    reason for consultation:   Bacteremia    Impression :   Current:  E. coli bacteremia, likely biliary source  Leukocytosis likely reactive due to surgery. Abdominal pain/elevated liver enzymes and biliary dilatation. Status post*upper GI endoscopy with EUS 9/15/2023  Perforated bowel status post repair of duodenal perforation 9/15/2023  Diabetes mellitus  Hypertension  Hyperlipidemia  History of breast cancer    Recommendations   IV ceftriaxone changed to cefepime yesterday   IV Flagyl m through 9/26/2023  Midline was placed  Supportive care  Follow CBC and renal function    Infection Control Recommendations   [place] Precautions      Antimicrobial Stewardship Recommendations   Simplification of therapy  Targeted therapy      History of Present Illness:   Initial history:  Abimael Kearney is a 82y.o.-year-old female who was transferred from [facility] 9/12/2023. She presented with right upper quadrant abdominal pain for several days associated with the nausea and vomiting. Symptoms moderate to severe, no alleviating or aggravating factors. She denied urinary symptoms, denied fever or chills, no other complaints  CT abdomen pelvis completed at [facility] showed biliary duct dilatation with the common bile duct measuring 1.4 cm. Liver enzymes were elevated with ALT of 130, , WBC 12.2. Blood cultures done at [facility] grew E. coli that was sensitive to cefazolin and ceftriaxone. Gallbladder ultrasound showed gallbladder sludge with dilated common bile duct  GI consulted, MRCP was completed but limited due to motion artifact. Afebrile, had EUS 4/56/0365, complicated by perforated bowel status post duodenal perforation repair 9/15/2023.   Interval changes  9/17/2023   She is complaining of abdominal pain, NG tube in place, afebrile, no bowel movement, Psychiatric:         Mood and Affect: Mood normal.         Behavior: Behavior normal.         Past Medical History:     Past Medical History:   Diagnosis Date    Arthritis     Cancer ([address])     Chronic back pain     Diabetes mellitus ([address])     DM (diabetes mellitus) ([address])     GERD (gastroesophageal reflux disease)     Hypertension     Spinal stenosis     Spondylolysis        Past Surgical  History:     Past Surgical History:   Procedure Laterality Date    BACK SURGERY      BREAST SURGERY      JOINT REPLACEMENT      LAPAROTOMY N/A 9/15/2023    LAPAROTOMY EXPLORATORY Drain placement performed by Mil Carter MD at  [address]  9/15/2023    EGD ESOPHAGOGASTRODUODENOSCOPY performed by Mil Carter MD at [address] [facility] [address] 9/15/2023    EGD BIOPSY EUS ULTRASOUND LINEAR performed by Jose Velázquez MD at [address] [facility] OR       Medications:      [Held by provider] sucralfate  1 g Oral 4 times per day    cefepime  2,000 mg IntraVENous Q24H    [Held by provider] aspirin  81 mg Oral Daily    [Held by provider] gabapentin  300 mg Oral BID    [Held by provider] losartan  50 mg Oral Daily    [Held by provider] pravastatin  40 mg Oral Daily    sodium chloride flush  5-40 mL IntraVENous 2 times per day    enoxaparin  40 mg SubCUTAneous Daily    metroNIDAZOLE  500 mg IntraVENous Q8H    pantoprazole (PROTONIX) 40 mg in sodium chloride (PF) 0.9 % 10 mL injection  40 mg IntraVENous Daily       Social History:     Social History     Socioeconomic History    Marital status: Unknown     Spouse name: Not on file    Number of children: Not on file    Years of education: Not on file    Highest education level: Not on file   Occupational History    Not on file   Tobacco Use    Smoking status: Never    Smokeless tobacco: Never   Substance and Sexual Activity    Alcohol use: Not on file    Drug use: Not on file    Sexual activity: Not on file   Other

## 2023-09-17 NOTE — PROGRESS NOTES
PROGRESS NOTE          [id number]  DATE: 9/17/2023    HD: # 5      Patient Active Problem List   Diagnosis    Choledocholithiasis    Benign essential HTN    Diabetes mellitus type 2 in nonobese ([address])    Mixed hyperlipidemia    Acute gastroenteritis    Dilation of biliary tract    Abnormal LFTs    Right upper quadrant abdominal pain    Weight loss    Bacteremia, escherichia coli    Cardiac arrhythmia    Calculus of gallbladder with acute cholecystitis without obstruction    Perforated bowel ([address])       DIAGNOSIS AND PLAN    POD 2 from duodenal repair and drain placement. Continue NPO, NGT. Strict NPO. Plan for upper GI tomm. If negative for leak will DC NGT. Recommend decreasing fluids and change to D 5 1/2NS w/ 20K. Monitor drain output. Dressing change daily. Daily labs. Replace lytes as needed. Will follow. Chief Complaint: \"I feel weak\"    SUBJECTIVE    No issues overnight. Vitals stable. Complaining of weakness and has some concerns of going home on her own. NGT in place. ELENI in place with serosang drainage. Passing flatus. No fevers or chills. No nausea or vomiting. OBJECTIVE  VITALS:   Vitals:    09/17/23 0823   BP:    Pulse:    Resp: 16   Temp:    SpO2:      Physical Exam    NAD  Alert and oriented. CV RRR  Lungs CTAB  Abdomen- incision c/d/I  ELENI in place - serosang output.    Ext- mild edema   Drain/tube output:  40cc    LAB:  CBC:   Recent Labs     09/15/23  0907 09/16/23  0438 09/17/23  0558   WBC 6.9 11.1* 14.6*   HGB 10.4* 9.5* 9.1*   HCT 31.9* 29.7* 28.7*   MCV 90.8 90.8 90.8    238 269     BMP:   Recent Labs     09/15/23  0907 09/16/23  0438 09/17/23  0558   * 134* 134*   K 4.6 3.9 3.8    105 104   CO2 22 19* 20   BUN 16 19 22   CREATININE 0.9 0.8 0.7   GLUCOSE 102* 120* 109*         Flora Shields MD  9/17/2023, 10:48 AM

## 2023-09-17 NOTE — PLAN OF CARE
Problem: Discharge Planning  Goal: Discharge to home or other facility with appropriate resources  9/17/2023 0306 by Solo Bustamante RN  Outcome: Progressing     Problem: Safety - Adult  Goal: Free from fall injury  9/17/2023 0306 by Solo Bustamante RN  Outcome: Progressing     Problem: Skin/Tissue Integrity  Goal: Absence of new skin breakdown  Description: 1. Monitor for areas of redness and/or skin breakdown  2. Assess vascular access sites hourly  3. Every 4-6 hours minimum:  Change oxygen saturation probe site  4. Every 4-6 hours:  If on nasal continuous positive airway pressure, respiratory therapy assess nares and determine need for appliance change or resting period.   9/17/2023 0306 by Solo Bustamante RN  Outcome: Progressing     Problem: Pain  Goal: Verbalizes/displays adequate comfort level or baseline comfort level  9/17/2023 0306 by Solo Bustamante RN  Outcome: Progressing

## 2023-09-17 NOTE — PROGRESS NOTES
Comprehensive Nutrition Assessment    Type and Reason for Visit:  Initial, NPO/Clear Liquid    Nutrition Recommendations/Plan:   Will monitor for initiation of diet      Malnutrition Assessment:  Malnutrition Status: At risk for malnutrition (Comment) (09/17/23 2826)    Context:  Acute Illness     Findings of the 6 clinical characteristics of malnutrition:  Energy Intake:  50% or less of estimated energy requirements for 5 or more days  Weight Loss:  Unable to assess     Body Fat Loss:  Unable to assess     Muscle Mass Loss:  Unable to assess    Fluid Accumulation:  Mild Extremities   Strength:  Not Performed    Nutrition Assessment:    Pt was admitted due to choledocholithiasis. She is now s/p (9/15) repair of duodenal perforation. She has had minimal po intake x 5 days. Nutrition Related Findings:    mild edema BUE, Labs/Meds: Reviewed, PMH: cancer, DM, GERD Wound Type: Surgical Incision       Current Nutrition Intake & Therapies:    Average Meal Intake: NPO     Diet NPO    Anthropometric Measures:  Height: 5' (152.4 cm)  Ideal Body Weight (IBW): 100 lbs (45 kg)    Admission Body Weight: 130 lb (59 kg)  Current Body Weight: 130 lb (59 kg),   IBW. Weight Source: Bed Scale  Current BMI (kg/m2): 25.4                          BMI Categories: Overweight (BMI 25.0-29. 9)    Estimated Daily Nutrient Needs:  Energy Requirements Based On: Formula  Weight Used for Energy Requirements: Admission  Energy (kcal/day): [place] x 1.2= 1200 kcal  Weight Used for Protein Requirements: Admission  Protein (g/day): 1.5g/kg= 85-90 g       Nutrition Diagnosis:   Inadequate oral intake related to altered GI function as evidenced by NPO or clear liquid status due to medical condition    Nutrition Interventions:   Food and/or Nutrient Delivery:  (start of nutrition)  Nutrition Education/Counseling: No recommendation at this time  Coordination of Nutrition Care: Continue to monitor while inpatient       Goals:     Goals:  (initate

## 2023-09-18 ENCOUNTER — APPOINTMENT (OUTPATIENT)
Dept: GENERAL RADIOLOGY | Age: 83
DRG: 981 | End: 2023-09-18
Attending: INTERNAL MEDICINE
Payer: MEDICARE

## 2023-09-18 LAB
ALBUMIN SERPL-MCNC: 2.9 G/DL (ref 3.5–5.2)
ALP SERPL-CCNC: 62 U/L (ref 35–104)
ALT SERPL-CCNC: 33 U/L (ref 5–33)
ANION GAP SERPL CALCULATED.3IONS-SCNC: 6 MMOL/L (ref 9–17)
AST SERPL-CCNC: 20 U/L
ATYPICAL LYMPHOCYTE ABSOLUTE COUNT: 0.12 K/UL
ATYPICAL LYMPHOCYTES: 1 %
BASOPHILS # BLD: 0 K/UL (ref 0–0.2)
BASOPHILS NFR BLD: 0 % (ref 0–2)
BILIRUB DIRECT SERPL-MCNC: 0.1 MG/DL
BILIRUB INDIRECT SERPL-MCNC: 0.2 MG/DL (ref 0–1)
BILIRUB SERPL-MCNC: 0.3 MG/DL (ref 0.3–1.2)
BUN SERPL-MCNC: 22 MG/DL (ref 8–23)
CALCIUM SERPL-MCNC: 9.4 MG/DL (ref 8.6–10.4)
CHLORIDE SERPL-SCNC: 105 MMOL/L (ref 98–107)
CO2 SERPL-SCNC: 24 MMOL/L (ref 20–31)
CREAT SERPL-MCNC: 0.6 MG/DL (ref 0.5–0.9)
EOSINOPHIL # BLD: 0.25 K/UL (ref 0–0.4)
EOSINOPHILS RELATIVE PERCENT: 2 % (ref 0–4)
ERYTHROCYTE [DISTWIDTH] IN BLOOD BY AUTOMATED COUNT: 13.4 % (ref 11.5–14.9)
GFR SERPL CREATININE-BSD FRML MDRD: >60 ML/MIN/1.73M2
GLUCOSE SERPL-MCNC: 159 MG/DL (ref 70–99)
HCT VFR BLD AUTO: 26.3 % (ref 36–46)
HGB BLD-MCNC: 8.6 G/DL (ref 12–16)
LYMPHOCYTES NFR BLD: 2.34 K/UL (ref 1–4.8)
LYMPHOCYTES RELATIVE PERCENT: 19 % (ref 24–44)
MCH RBC QN AUTO: 29.3 PG (ref 26–34)
MCHC RBC AUTO-ENTMCNC: 32.7 G/DL (ref 31–37)
MCV RBC AUTO: 89.5 FL (ref 80–100)
MONOCYTES NFR BLD: 0.62 K/UL (ref 0.1–1.3)
MONOCYTES NFR BLD: 5 % (ref 1–7)
MORPHOLOGY: ABNORMAL
NEUTROPHILS NFR BLD: 73 % (ref 36–66)
NEUTS SEG NFR BLD: 8.97 K/UL (ref 1.3–9.1)
PLATELET # BLD AUTO: 279 K/UL (ref 150–450)
PMV BLD AUTO: 8.3 FL (ref 6–12)
POTASSIUM SERPL-SCNC: 3.9 MMOL/L (ref 3.7–5.3)
PROT SERPL-MCNC: 5.4 G/DL (ref 6.4–8.3)
RBC # BLD AUTO: 2.94 M/UL (ref 4–5.2)
SODIUM SERPL-SCNC: 135 MMOL/L (ref 135–144)
WBC OTHER # BLD: 12.3 K/UL (ref 3.5–11)

## 2023-09-18 PROCEDURE — 99231 SBSQ HOSP IP/OBS SF/LOW 25: CPT | Performed by: INTERNAL MEDICINE

## 2023-09-18 PROCEDURE — 99024 POSTOP FOLLOW-UP VISIT: CPT | Performed by: SURGERY

## 2023-09-18 PROCEDURE — 2580000003 HC RX 258: Performed by: INTERNAL MEDICINE

## 2023-09-18 PROCEDURE — 99233 SBSQ HOSP IP/OBS HIGH 50: CPT | Performed by: INTERNAL MEDICINE

## 2023-09-18 PROCEDURE — 6360000002 HC RX W HCPCS: Performed by: INTERNAL MEDICINE

## 2023-09-18 PROCEDURE — 97116 GAIT TRAINING THERAPY: CPT

## 2023-09-18 PROCEDURE — 2500000003 HC RX 250 WO HCPCS: Performed by: INTERNAL MEDICINE

## 2023-09-18 PROCEDURE — C9113 INJ PANTOPRAZOLE SODIUM, VIA: HCPCS | Performed by: INTERNAL MEDICINE

## 2023-09-18 PROCEDURE — 74240 X-RAY XM UPR GI TRC 1CNTRST: CPT

## 2023-09-18 PROCEDURE — 97530 THERAPEUTIC ACTIVITIES: CPT

## 2023-09-18 PROCEDURE — 6360000002 HC RX W HCPCS: Performed by: NURSE PRACTITIONER

## 2023-09-18 PROCEDURE — 2060000000 HC ICU INTERMEDIATE R&B

## 2023-09-18 PROCEDURE — 85025 COMPLETE CBC W/AUTO DIFF WBC: CPT

## 2023-09-18 PROCEDURE — 6360000002 HC RX W HCPCS: Performed by: SURGERY

## 2023-09-18 PROCEDURE — 97110 THERAPEUTIC EXERCISES: CPT

## 2023-09-18 PROCEDURE — 80048 BASIC METABOLIC PNL TOTAL CA: CPT

## 2023-09-18 PROCEDURE — 99232 SBSQ HOSP IP/OBS MODERATE 35: CPT | Performed by: INTERNAL MEDICINE

## 2023-09-18 PROCEDURE — 80076 HEPATIC FUNCTION PANEL: CPT

## 2023-09-18 PROCEDURE — A4216 STERILE WATER/SALINE, 10 ML: HCPCS | Performed by: INTERNAL MEDICINE

## 2023-09-18 PROCEDURE — 6360000004 HC RX CONTRAST MEDICATION: Performed by: SURGERY

## 2023-09-18 RX ADMIN — FENTANYL CITRATE 50 MCG: 0.05 INJECTION, SOLUTION INTRAMUSCULAR; INTRAVENOUS at 01:01

## 2023-09-18 RX ADMIN — FENTANYL CITRATE 50 MCG: 0.05 INJECTION, SOLUTION INTRAMUSCULAR; INTRAVENOUS at 04:20

## 2023-09-18 RX ADMIN — SODIUM CHLORIDE, PRESERVATIVE FREE 10 ML: 5 INJECTION INTRAVENOUS at 20:20

## 2023-09-18 RX ADMIN — METRONIDAZOLE 500 MG: 500 INJECTION, SOLUTION INTRAVENOUS at 18:52

## 2023-09-18 RX ADMIN — FENTANYL CITRATE 50 MCG: 0.05 INJECTION, SOLUTION INTRAMUSCULAR; INTRAVENOUS at 17:23

## 2023-09-18 RX ADMIN — METOPROLOL TARTRATE 5 MG: 5 INJECTION, SOLUTION INTRAVENOUS at 23:13

## 2023-09-18 RX ADMIN — SODIUM CHLORIDE, PRESERVATIVE FREE 10 ML: 5 INJECTION INTRAVENOUS at 09:27

## 2023-09-18 RX ADMIN — ENOXAPARIN SODIUM 40 MG: 100 INJECTION SUBCUTANEOUS at 09:00

## 2023-09-18 RX ADMIN — FENTANYL CITRATE 50 MCG: 0.05 INJECTION, SOLUTION INTRAMUSCULAR; INTRAVENOUS at 13:11

## 2023-09-18 RX ADMIN — CEFEPIME 2000 MG: 2 INJECTION, POWDER, FOR SOLUTION INTRAVENOUS at 14:19

## 2023-09-18 RX ADMIN — ONDANSETRON 4 MG: 2 INJECTION INTRAMUSCULAR; INTRAVENOUS at 11:32

## 2023-09-18 RX ADMIN — DIATRIZOATE MEGLUMINE AND DIATRIZOATE SODIUM 240 ML: 660; 100 LIQUID ORAL; RECTAL at 10:26

## 2023-09-18 RX ADMIN — METRONIDAZOLE 500 MG: 500 INJECTION, SOLUTION INTRAVENOUS at 01:40

## 2023-09-18 RX ADMIN — HYDRALAZINE HYDROCHLORIDE 10 MG: 20 INJECTION INTRAMUSCULAR; INTRAVENOUS at 19:13

## 2023-09-18 RX ADMIN — SODIUM CHLORIDE, PRESERVATIVE FREE 40 MG: 5 INJECTION INTRAVENOUS at 09:00

## 2023-09-18 RX ADMIN — DEXTROSE AND SODIUM CHLORIDE: 5; 450 INJECTION, SOLUTION INTRAVENOUS at 02:08

## 2023-09-18 RX ADMIN — FENTANYL CITRATE 50 MCG: 0.05 INJECTION, SOLUTION INTRAMUSCULAR; INTRAVENOUS at 20:20

## 2023-09-18 RX ADMIN — FENTANYL CITRATE 50 MCG: 0.05 INJECTION, SOLUTION INTRAMUSCULAR; INTRAVENOUS at 09:00

## 2023-09-18 RX ADMIN — METRONIDAZOLE 500 MG: 500 INJECTION, SOLUTION INTRAVENOUS at 11:10

## 2023-09-18 RX ADMIN — FENTANYL CITRATE 50 MCG: 0.05 INJECTION, SOLUTION INTRAMUSCULAR; INTRAVENOUS at 11:22

## 2023-09-18 RX ADMIN — FENTANYL CITRATE 50 MCG: 0.05 INJECTION, SOLUTION INTRAMUSCULAR; INTRAVENOUS at 23:35

## 2023-09-18 RX ADMIN — FENTANYL CITRATE 50 MCG: 0.05 INJECTION, SOLUTION INTRAMUSCULAR; INTRAVENOUS at 06:36

## 2023-09-18 ASSESSMENT — PAIN DESCRIPTION - ORIENTATION: ORIENTATION: MID

## 2023-09-18 ASSESSMENT — PAIN DESCRIPTION - DESCRIPTORS
DESCRIPTORS: SHARP
DESCRIPTORS: ACHING;DISCOMFORT
DESCRIPTORS: THROBBING
DESCRIPTORS: THROBBING

## 2023-09-18 ASSESSMENT — PAIN SCALES - GENERAL
PAINLEVEL_OUTOF10: 8
PAINLEVEL_OUTOF10: 8
PAINLEVEL_OUTOF10: 7
PAINLEVEL_OUTOF10: 8
PAINLEVEL_OUTOF10: 9
PAINLEVEL_OUTOF10: 5
PAINLEVEL_OUTOF10: 5
PAINLEVEL_OUTOF10: 4
PAINLEVEL_OUTOF10: 9
PAINLEVEL_OUTOF10: 9
PAINLEVEL_OUTOF10: 4

## 2023-09-18 ASSESSMENT — PAIN DESCRIPTION - LOCATION
LOCATION: ABDOMEN

## 2023-09-18 NOTE — PROGRESS NOTES
[facility]   INPATIENT OCCUPATIONAL THERAPY  PROGRESS NOTE  Date: 2023  Patient Name: Valentina Or       Room: 4/2114-01  MRN: [id number]    : 1940  (82 y.o.)  Gender: female   Referring Practitioner: Tanvir Boyce MD  Diagnosis: Choledocholithiasis      Discharge Recommendations:  Further Occupational Therapy is recommended upon facility discharge. OT Equipment Recommendations  Other: TBD    Restrictions/Precautions  Restrictions/Precautions  Restrictions/Precautions: Fall Risk  Required Braces or Orthoses?: No  Implants present? : Metal implants (right TKR)  Position Activity Restriction  Other position/activity restrictions: NO BP, IV sticks or venipunctures right UE. Subjective  Subjective  Subjective: Pt reports 10/10 pain. Comments: Okay for treat per [employer]. Pt pleasant and cooperative. Objective  Orientation  Overall Orientation Status: Impaired  Orientation Level: Oriented to person;Oriented to place;Oriented to time;Disoriented to situation  Cognition  Overall Cognitive Status: Exceptions  Arousal/Alertness: Delayed responses to stimuli  Following Commands: Follows one step commands with increased time; Follows one step commands with repetition  Attention Span: Attends with cues to redirect  Safety Judgement: Decreased awareness of need for assistance;Decreased awareness of need for safety  Problem Solving: Assistance required to generate solutions;Assistance required to implement solutions;Assistance required to identify errors made;Assistance required to correct errors made  Insights: Decreased awareness of deficits  Initiation: Requires cues for some  Sequencing: Requires cues for some    Activities of Daily Living  ADL  Feeding: NPO  Grooming: Stand by assistance  UE Bathing: Minimal assistance  LE Bathing: Maximum assistance  UE Dressing: Minimal assistance  LE Dressing: Maximum assistance  Toileting: body clothing?: A Lot  How much help is needed for bathing (which includes washing, rinsing, drying)?: A Lot  How much help is needed for toileting (which includes using toilet, bedpan, or urinal)?: Total  How much help is needed for putting on and taking off regular upper body clothing?: A Little  How much help is needed for taking care of personal grooming?: A Little  How much help for eating meals?: None (NPO)  AM-PAC Inpatient Daily Activity Raw Score: 15  AM-PAC Inpatient ADL T-Scale Score : 34.69  ADL Inpatient CMS 0-100% Score: 56.46  ADL Inpatient CMS G-Code Modifier : CK    OT Minutes  OT Individual Minutes  Time In: 1451  Time Out: 1521  Minutes: 30      Electronically signed by JAMES Arteaga on 9/18/23 at 3:46 PM EDT

## 2023-09-18 NOTE — PROGRESS NOTES
09/18/23 1152   Encounter Summary   Encounter Overview/Reason  Spiritual/Emotional Needs   Referral/Consult From: Rounding   Complexity of Encounter Low   Spiritual/Emotional needs   Type Spiritual Support   Assessment/Intervention/Outcome   Assessment Unable to assess   Intervention Prayer (assurance of)/[place]

## 2023-09-18 NOTE — PROGRESS NOTES
[address] [facility]  [facility]  Garnet Primrose, MD; Xiomy Treviño MD; Kassidy Allen MD; MD Ryder Swift MD; Rosa Trinidad MD; Ehsan Nava MD        Progress Note    9/18/2023    11:53 AM    Name:   Michael Steiner  MRN:     [id number]     Acct:      [de-identified]   Room:   2114/2[facility]  IP Day:  6  Admit Date:  9/12/2023  3:31 AM    PCP:   No primary care provider on file. Code Status:  Full Code    Subjective:     C/C: No chief complaint on file. Abdominal pain      Interval History Status: Improving    HPI:     Initially admitted for management of abdominal pain, noted to have choledocholithiasis  9/18  Patient, clinically doing almost the same  Complaining of abdominal pain  NG in situ  Had episode of vomiting , during UGI STUDIES   Did not pass stools, flatus    Review of Systems:     Denies any shortness of breath or cough  Denies chest pain or palpitations  Appropriate postsurgical abdominal pain, no vomiting  Denies any new numbness tremors or weakness. Medications: Allergies:     Allergies   Allergen Reactions    Ace Inhibitors Nausea Only    Mixed Ragweed     Sulfa Antibiotics Nausea Only    Contrast [Iodides] Nausea And Vomiting    Pcn [Penicillins] Rash       Current Meds:   Scheduled Meds:    [Held by provider] sucralfate  1 g Oral 4 times per day    cefepime  2,000 mg IntraVENous Q24H    [Held by provider] aspirin  81 mg Oral Daily    [Held by provider] gabapentin  300 mg Oral BID    [Held by provider] losartan  50 mg Oral Daily    [Held by provider] pravastatin  40 mg Oral Daily    sodium chloride flush  5-40 mL IntraVENous 2 times per day    enoxaparin  40 mg SubCUTAneous Daily    metroNIDAZOLE  500 mg IntraVENous Q8H    pantoprazole (PROTONIX) 40 mg in sodium chloride (PF) 0.9 % 10 mL injection  40 mg IntraVENous Daily     Continuous Infusions:    dextrose 5 % and 0.45 % NaCl 75 mL/hr at 09/18/23 0208 General appearance:  alert, cooperative and no distress  Eyes: Anicteric sclera. Pupils are equally round and reactive to light. Extraocular movements are intact.   Lungs:  clear to auscultation bilaterally, normal effort  Heart:  regular rate and rhythm, no murmur  Abdomen:  soft, abdominal binder and drains present, appropriate postsurgical tenderness, good bowel sounds  Extremities:  no edema, redness, tenderness in the calves  Skin:  no gross lesions, rashes, induration  Neuro:  Alert, oriented X 3, no new focal weakness  Assessment:        Primary Problem  Choledocholithiasis    Active Hospital Problems    Diagnosis Date Noted    Cardiac arrhythmia [I49.9] 09/16/2023    Calculus of gallbladder with acute cholecystitis without obstruction [K80.00] 09/16/2023    Perforated bowel ([address]) [K63.1] 09/16/2023    Bacteremia, escherichia coli [R78.81, B96.20] 09/13/2023    Choledocholithiasis [K80.50] 09/12/2023    Benign essential HTN [I10] 09/12/2023    Diabetes mellitus type 2 in nonobese ([address]) [E11.9] 09/12/2023    Mixed hyperlipidemia [E78.2] 09/12/2023    Acute gastroenteritis [K52.9] 09/12/2023    Dilation of biliary tract [K83.8] 09/12/2023    Abnormal LFTs [R79.89] 09/12/2023    Right upper quadrant abdominal pain [R10.11] 09/12/2023    Weight loss [R63.4] 09/12/2023           DVT prophylaxis: Mechanical only  Antibiotics: Cefepime and Flagyl    Plan:        82-year-old female initially admitted with acute abdominal pain likely due to choledocholithiasis, 1.4 cm CBD dilatation, elevated LFT, ultrasound no cholecystitis, MRCP unremarkable    Also treated for gastroenteritis-Rocephin and Flagyl started  Type 2 diabetes  HTN, HLD  Iron deficiency anemia with very low iron  Noted to have E. coli bacteremia ID was consulted likely biliary source    On 9/14 she developed chest pain, SVT, cardiology was consulted  EUS on 9/15-concern for duodenal perforation General surgery consulted, perforation confirmed on CT

## 2023-09-18 NOTE — CARE COORDINATION
Writer met with pt son Freddie Rader regarding placement. Dada would like a referral sent to [facility], recommended by family friends. Dada resides in [place] and plans to move the pt into his home when renovations are complete. Writer checked destination status of [facility] ACUTE [facility] referral.  Referral failed, sent to wrong location. Referrals sent. Electronically signed by MICKY Lr on 9/18/2023 at 10:32 AM    Writer received call from Dee Dee at [facility] TER[facility] AC[facility] C[facility]. If pt will admit on IV antibiotic, facility denies due to cost.  Writer confirmed pt will need IV antibiotics at discharge.   Electronically signed by MICKY Lr on 9/18/2023 at 4:26 PM

## 2023-09-18 NOTE — PROGRESS NOTES
Radiology reached out patient will need another x-ray and is not to be hooked back up to suction at this time.

## 2023-09-18 NOTE — PLAN OF CARE
Problem: Discharge Planning  Goal: Discharge to home or other facility with appropriate resources  9/18/2023 0307 by Chuck Mccoy RN  Outcome: Progressing     Problem: Safety - Adult  Goal: Free from fall injury  9/18/2023 0307 by Chuck Mccoy RN  Outcome: Progressing     Problem: Pain  Goal: Verbalizes/displays adequate comfort level or baseline comfort level  9/18/2023 0307 by Chuck Mccoy RN  Outcome: Progressing     Problem: Chronic Conditions and Co-morbidities  Goal: Patient's chronic conditions and co-morbidity symptoms are monitored and maintained or improved  9/18/2023 0307 by Chuck Mccoy RN  Outcome: Progressing     Problem: Skin/Tissue Integrity  Goal: Absence of new skin breakdown  Description: 1. Monitor for areas of redness and/or skin breakdown  2. Assess vascular access sites hourly  3. Every 4-6 hours minimum:  Change oxygen saturation probe site  4. Every 4-6 hours:  If on nasal continuous positive airway pressure, respiratory therapy assess nares and determine need for appliance change or resting period.   9/18/2023 0307 by Chuck Mccoy RN  Outcome: Progressing     Problem: Nutrition Deficit:  Goal: Optimize nutritional status  9/18/2023 0307 by Chuck Mccoy RN  Outcome: Progressing

## 2023-09-18 NOTE — PROGRESS NOTES
[facility] -   Infectious diseases evaluation  admission date 9/12/2023    reason for consultation:   Bacteremia    Impression :   Current:  E. coli bacteremia, likely biliary source  Abdominal pain/elevated liver enzymes and biliary dilatation. Status post*upper GI endoscopy with EUS 9/15/2023  Perforated bowel status post repair of duodenal perforation 9/15/2023  Diabetes mellitus  Hypertension  Hyperlipidemia  History of breast cancer    Recommendations   IV cefepime, may change to ceftriaxone 2g daily on discharge through 9/26/2023  IV Flagyl m through 9/26/2023  Midline in place  Supportive care  Follow CBC and renal function    Infection Control Recommendations   [place] Precautions      Antimicrobial Stewardship Recommendations   Simplification of therapy  Targeted therapy      History of Present Illness:   Initial history:  Germaine Raines is a 82y.o.-year-old female who was transferred from [facility] 9/12/2023. She presented with right upper quadrant abdominal pain for several days associated with the nausea and vomiting. Symptoms moderate to severe, no alleviating or aggravating factors. She denied urinary symptoms, denied fever or chills, no other complaints  CT abdomen pelvis completed at [facility] showed biliary duct dilatation with the common bile duct measuring 1.4 cm. Liver enzymes were elevated with ALT of 130, , WBC 12.2. Blood cultures done at [facility] grew E. coli that was sensitive to cefazolin and ceftriaxone. Gallbladder ultrasound showed gallbladder sludge with dilated common bile duct  GI consulted, MRCP was completed but limited due to motion artifact. Afebrile, had EUS 1/86/2074, complicated by perforated bowel status post duodenal perforation repair 9/15/2023.   Interval changes  9/18/2023   She is sleepy, arousable, NG tube in place, postoperative pain tolerable, no bowel movement postop time.   Psychiatric:         Mood and Affect: Mood normal.         Behavior: Behavior normal.         Past Medical History:     Past Medical History:   Diagnosis Date    Arthritis     Cancer ([address])     Chronic back pain     Diabetes mellitus ([address])     DM (diabetes mellitus) ([address])     GERD (gastroesophageal reflux disease)     Hypertension     Spinal stenosis     Spondylolysis        Past Surgical  History:     Past Surgical History:   Procedure Laterality Date    BACK SURGERY      BREAST SURGERY      JOINT REPLACEMENT      LAPAROTOMY N/A 9/15/2023    LAPAROTOMY EXPLORATORY Drain placement performed by Alex Hensley MD at [address]  9/15/2023    EGD ESOPHAGOGASTRODUODENOSCOPY performed by Alex Hensley MD at [address] [facility] 9/15/2023    EGD BIOPSY EUS ULTRASOUND LINEAR performed by Remi Borja MD at [address] [facility] OR       Medications:      [Held by provider] sucralfate  1 g Oral 4 times per day    cefepime  2,000 mg IntraVENous Q24H    [Held by provider] aspirin  81 mg Oral Daily    [Held by provider] gabapentin  300 mg Oral BID    [Held by provider] losartan  50 mg Oral Daily    [Held by provider] pravastatin  40 mg Oral Daily    sodium chloride flush  5-40 mL IntraVENous 2 times per day    enoxaparin  40 mg SubCUTAneous Daily    metroNIDAZOLE  500 mg IntraVENous Q8H    pantoprazole (PROTONIX) 40 mg in sodium chloride (PF) 0.9 % 10 mL injection  40 mg IntraVENous Daily       Social History:     Social History     Socioeconomic History    Marital status: Unknown     Spouse name: Not on file    Number of children: Not on file    Years of education: Not on file    Highest education level: Not on file   Occupational History    Not on file   Tobacco Use    Smoking status: Never    Smokeless tobacco: Never   Substance and Sexual Activity    Alcohol use: Not on file    Drug use: Not on file    Sexual activity: Not on file

## 2023-09-18 NOTE — PROGRESS NOTES
PROGRESS NOTE          PATIENT NAME: Kellee Howe [facility] RECORD NO. [id number]  DATE: 9/18/2023    HD: # 6      Patient Active Problem List   Diagnosis    Choledocholithiasis    Benign essential HTN    Diabetes mellitus type 2 in nonobese (HCC)    Mixed hyperlipidemia    Acute gastroenteritis    Dilation of biliary tract    Abnormal LFTs    Right upper quadrant abdominal pain    Weight loss    Bacteremia, escherichia coli    Cardiac arrhythmia    Calculus of gallbladder with acute cholecystitis without obstruction    Perforated bowel ([address])       DIAGNOSIS AND PLAN      83 yo F POD 3 from duodenal repair. Reviewed upper GI with radiologist. Ej Jazmin to be negative for leak. She did have some vomiting after the study so there may be some slow transit out of the stomach. NGT discontinued. OK FOR SIPS OF CLD. 3.   Continue to monitor drain output. 4.   Encourage ambulation. PT/OT. Chief Complaint: \"I feel tired\"    SUBJECTIVE    No issues overnight. No fevers of chills. Vitals stable overnight. Pain controlled. No nausea or vomiting. NGT in place. ELENI in place. Upper GI today.      OBJECTIVE  VITALS:   Vitals:    09/18/23 0706   BP:    Pulse:    Resp: 16   Temp:    SpO2:      Physical Exam  NAD  CV RRR  CHEST CTAB  Abdomen : soft , approp tender, incision c/d/I. ELENI sero sang      Drain/tube output:      LAB:  CBC:   Recent Labs     09/16/23 0438 09/17/23  0558 09/18/23  0538   WBC 11.1* 14.6* 12.3*   HGB 9.5* 9.1* 8.6*   HCT 29.7* 28.7* 26.3*   MCV 90.8 90.8 89.5    269 279     BMP:   Recent Labs     09/16/23  0438 09/17/23  0558 09/18/23  0538   * 134* 135   K 3.9 3.8 3.9    104 105   CO2 19* 20 24   BUN 19 22 22   CREATININE 0.8 0.7 0.6   GLUCOSE 120* 109* 159*           Ashia Khan MD  9/18/2023, 10:24 AM

## 2023-09-19 LAB
ANION GAP SERPL CALCULATED.3IONS-SCNC: 9 MMOL/L (ref 9–17)
BUN SERPL-MCNC: 16 MG/DL (ref 8–23)
CALCIUM SERPL-MCNC: 8.5 MG/DL (ref 8.6–10.4)
CHLORIDE SERPL-SCNC: 102 MMOL/L (ref 98–107)
CO2 SERPL-SCNC: 20 MMOL/L (ref 20–31)
CREAT SERPL-MCNC: 0.5 MG/DL (ref 0.5–0.9)
ERYTHROCYTE [DISTWIDTH] IN BLOOD BY AUTOMATED COUNT: 13.4 % (ref 11.5–14.9)
GFR SERPL CREATININE-BSD FRML MDRD: >60 ML/MIN/1.73M2
GLUCOSE BLD-MCNC: 138 MG/DL (ref 65–105)
GLUCOSE BLD-MCNC: 158 MG/DL (ref 65–105)
GLUCOSE SERPL-MCNC: 224 MG/DL (ref 70–99)
HCT VFR BLD AUTO: 27.1 % (ref 36–46)
HGB BLD-MCNC: 8.5 G/DL (ref 12–16)
MAGNESIUM SERPL-MCNC: 1.7 MG/DL (ref 1.6–2.6)
MCH RBC QN AUTO: 28.6 PG (ref 26–34)
MCHC RBC AUTO-ENTMCNC: 31.5 G/DL (ref 31–37)
MCV RBC AUTO: 91 FL (ref 80–100)
PHOSPHATE SERPL-MCNC: 1.9 MG/DL (ref 2.6–4.5)
PLATELET # BLD AUTO: 325 K/UL (ref 150–450)
PMV BLD AUTO: 7.9 FL (ref 6–12)
POTASSIUM SERPL-SCNC: 3.3 MMOL/L (ref 3.7–5.3)
RBC # BLD AUTO: 2.98 M/UL (ref 4–5.2)
SODIUM SERPL-SCNC: 131 MMOL/L (ref 135–144)
WBC OTHER # BLD: 13.1 K/UL (ref 3.5–11)

## 2023-09-19 PROCEDURE — 2500000003 HC RX 250 WO HCPCS: Performed by: SURGERY

## 2023-09-19 PROCEDURE — 2580000003 HC RX 258: Performed by: INTERNAL MEDICINE

## 2023-09-19 PROCEDURE — 85027 COMPLETE CBC AUTOMATED: CPT

## 2023-09-19 PROCEDURE — 6360000002 HC RX W HCPCS: Performed by: INTERNAL MEDICINE

## 2023-09-19 PROCEDURE — A4216 STERILE WATER/SALINE, 10 ML: HCPCS | Performed by: INTERNAL MEDICINE

## 2023-09-19 PROCEDURE — 2500000003 HC RX 250 WO HCPCS: Performed by: NURSE PRACTITIONER

## 2023-09-19 PROCEDURE — 6360000002 HC RX W HCPCS: Performed by: SURGERY

## 2023-09-19 PROCEDURE — 99232 SBSQ HOSP IP/OBS MODERATE 35: CPT | Performed by: INTERNAL MEDICINE

## 2023-09-19 PROCEDURE — 2060000000 HC ICU INTERMEDIATE R&B

## 2023-09-19 PROCEDURE — 97530 THERAPEUTIC ACTIVITIES: CPT

## 2023-09-19 PROCEDURE — 6360000002 HC RX W HCPCS: Performed by: NURSE PRACTITIONER

## 2023-09-19 PROCEDURE — 97116 GAIT TRAINING THERAPY: CPT

## 2023-09-19 PROCEDURE — 99024 POSTOP FOLLOW-UP VISIT: CPT | Performed by: SURGERY

## 2023-09-19 PROCEDURE — 2580000003 HC RX 258: Performed by: NURSE PRACTITIONER

## 2023-09-19 PROCEDURE — 83036 HEMOGLOBIN GLYCOSYLATED A1C: CPT

## 2023-09-19 PROCEDURE — 80048 BASIC METABOLIC PNL TOTAL CA: CPT

## 2023-09-19 PROCEDURE — C9113 INJ PANTOPRAZOLE SODIUM, VIA: HCPCS | Performed by: INTERNAL MEDICINE

## 2023-09-19 PROCEDURE — 84100 ASSAY OF PHOSPHORUS: CPT

## 2023-09-19 PROCEDURE — 97110 THERAPEUTIC EXERCISES: CPT

## 2023-09-19 PROCEDURE — 2500000003 HC RX 250 WO HCPCS: Performed by: INTERNAL MEDICINE

## 2023-09-19 PROCEDURE — 83735 ASSAY OF MAGNESIUM: CPT

## 2023-09-19 PROCEDURE — 82947 ASSAY GLUCOSE BLOOD QUANT: CPT

## 2023-09-19 RX ORDER — DEXTROSE MONOHYDRATE, SODIUM CHLORIDE, AND POTASSIUM CHLORIDE 50; 2.98; 4.5 G/1000ML; G/1000ML; G/1000ML
INJECTION, SOLUTION INTRAVENOUS CONTINUOUS
Status: DISCONTINUED | OUTPATIENT
Start: 2023-09-19 | End: 2023-09-20

## 2023-09-19 RX ORDER — METOPROLOL TARTRATE 5 MG/5ML
5 INJECTION INTRAVENOUS EVERY 6 HOURS
Status: DISCONTINUED | OUTPATIENT
Start: 2023-09-19 | End: 2023-09-23

## 2023-09-19 RX ORDER — MAGNESIUM SULFATE 1 G/100ML
1000 INJECTION INTRAVENOUS ONCE
Status: COMPLETED | OUTPATIENT
Start: 2023-09-19 | End: 2023-09-19

## 2023-09-19 RX ORDER — DEXTROSE MONOHYDRATE 100 MG/ML
INJECTION, SOLUTION INTRAVENOUS CONTINUOUS PRN
Status: DISCONTINUED | OUTPATIENT
Start: 2023-09-19 | End: 2023-09-24 | Stop reason: SDUPTHER

## 2023-09-19 RX ORDER — INSULIN LISPRO 100 [IU]/ML
0-4 INJECTION, SOLUTION INTRAVENOUS; SUBCUTANEOUS EVERY 6 HOURS
Status: DISCONTINUED | OUTPATIENT
Start: 2023-09-19 | End: 2023-09-21

## 2023-09-19 RX ADMIN — METOPROLOL TARTRATE 5 MG: 5 INJECTION, SOLUTION INTRAVENOUS at 21:18

## 2023-09-19 RX ADMIN — SODIUM PHOSPHATE, MONOBASIC, MONOHYDRATE AND SODIUM PHOSPHATE, DIBASIC, ANHYDROUS 15 MMOL: 142; 276 INJECTION, SOLUTION INTRAVENOUS at 14:52

## 2023-09-19 RX ADMIN — POTASSIUM CHLORIDE, DEXTROSE MONOHYDRATE AND SODIUM CHLORIDE: 300; 5; 450 INJECTION, SOLUTION INTRAVENOUS at 16:32

## 2023-09-19 RX ADMIN — SODIUM CHLORIDE, PRESERVATIVE FREE 40 MG: 5 INJECTION INTRAVENOUS at 08:44

## 2023-09-19 RX ADMIN — I.V. FAT EMULSION 100 ML: 20 EMULSION INTRAVENOUS at 18:19

## 2023-09-19 RX ADMIN — FENTANYL CITRATE 50 MCG: 0.05 INJECTION, SOLUTION INTRAMUSCULAR; INTRAVENOUS at 22:52

## 2023-09-19 RX ADMIN — FENTANYL CITRATE 50 MCG: 0.05 INJECTION, SOLUTION INTRAMUSCULAR; INTRAVENOUS at 12:54

## 2023-09-19 RX ADMIN — METRONIDAZOLE 500 MG: 500 INJECTION, SOLUTION INTRAVENOUS at 02:29

## 2023-09-19 RX ADMIN — FENTANYL CITRATE 50 MCG: 0.05 INJECTION, SOLUTION INTRAMUSCULAR; INTRAVENOUS at 19:50

## 2023-09-19 RX ADMIN — MAGNESIUM SULFATE HEPTAHYDRATE 1000 MG: 1 INJECTION, SOLUTION INTRAVENOUS at 16:16

## 2023-09-19 RX ADMIN — SODIUM CHLORIDE, PRESERVATIVE FREE 10 ML: 5 INJECTION INTRAVENOUS at 19:52

## 2023-09-19 RX ADMIN — CEFTRIAXONE SODIUM 2000 MG: 2 INJECTION, POWDER, FOR SOLUTION INTRAMUSCULAR; INTRAVENOUS at 20:01

## 2023-09-19 RX ADMIN — FENTANYL CITRATE 50 MCG: 0.05 INJECTION, SOLUTION INTRAMUSCULAR; INTRAVENOUS at 08:44

## 2023-09-19 RX ADMIN — METRONIDAZOLE 500 MG: 500 INJECTION, SOLUTION INTRAVENOUS at 11:01

## 2023-09-19 RX ADMIN — HYDRALAZINE HYDROCHLORIDE 10 MG: 20 INJECTION INTRAMUSCULAR; INTRAVENOUS at 19:22

## 2023-09-19 RX ADMIN — POTASSIUM PHOSPHATE, MONOBASIC POTASSIUM PHOSPHATE, DIBASIC: 224; 236 INJECTION, SOLUTION, CONCENTRATE INTRAVENOUS at 18:19

## 2023-09-19 RX ADMIN — FENTANYL CITRATE 50 MCG: 0.05 INJECTION, SOLUTION INTRAMUSCULAR; INTRAVENOUS at 16:19

## 2023-09-19 RX ADMIN — FENTANYL CITRATE 50 MCG: 0.05 INJECTION, SOLUTION INTRAMUSCULAR; INTRAVENOUS at 10:53

## 2023-09-19 RX ADMIN — ENOXAPARIN SODIUM 40 MG: 100 INJECTION SUBCUTANEOUS at 08:45

## 2023-09-19 RX ADMIN — FENTANYL CITRATE 50 MCG: 0.05 INJECTION, SOLUTION INTRAMUSCULAR; INTRAVENOUS at 02:22

## 2023-09-19 RX ADMIN — CEFEPIME 2000 MG: 2 INJECTION, POWDER, FOR SOLUTION INTRAVENOUS at 14:51

## 2023-09-19 RX ADMIN — METRONIDAZOLE 500 MG: 500 INJECTION, SOLUTION INTRAVENOUS at 21:29

## 2023-09-19 RX ADMIN — METOPROLOL TARTRATE 5 MG: 5 INJECTION, SOLUTION INTRAVENOUS at 14:45

## 2023-09-19 ASSESSMENT — PAIN SCALES - GENERAL
PAINLEVEL_OUTOF10: 6
PAINLEVEL_OUTOF10: 8
PAINLEVEL_OUTOF10: 9
PAINLEVEL_OUTOF10: 10
PAINLEVEL_OUTOF10: 8
PAINLEVEL_OUTOF10: 8

## 2023-09-19 ASSESSMENT — PAIN DESCRIPTION - LOCATION
LOCATION: ABDOMEN
LOCATION: ABDOMEN
LOCATION: ABDOMEN;NECK
LOCATION: ABDOMEN

## 2023-09-19 ASSESSMENT — PAIN DESCRIPTION - ORIENTATION: ORIENTATION: MID

## 2023-09-19 ASSESSMENT — PAIN DESCRIPTION - DESCRIPTORS
DESCRIPTORS: DISCOMFORT

## 2023-09-19 NOTE — PLAN OF CARE
Problem: Safety - Adult  Goal: Free from fall injury  Outcome: Progressing     Problem: Pain  Goal: Verbalizes/displays adequate comfort level or baseline comfort level  Outcome: Progressing     Problem: Chronic Conditions and Co-morbidities  Goal: Patient's chronic conditions and co-morbidity symptoms are monitored and maintained or improved  Outcome: Progressing     Problem: Nutrition Deficit:  Goal: Optimize nutritional status  Outcome: Progressing     Problem: Discharge Planning  Goal: Discharge to home or other facility with appropriate resources  Outcome: Progressing

## 2023-09-19 NOTE — PROGRESS NOTES
[address] [facility]  [facility] Internal Medicine  Lin Mcfarland MD; Laura Muñoz MD; Bing Berumen MD; MD Gabbi Bae MD; Gabbi Ann MD; Anastasia Jenkins MD        Progress Note    9/19/2023    9:58 AM    Name:   Jatin Shelby  MRN:     [id number]     Acct:      [de-identified]   Room:   2114/2114-01  IP Day:  7  Admit Date:  9/12/2023  3:31 AM    PCP:   No primary care provider on file. Code Status:  Full Code    Subjective:     C/C: No chief complaint on file. Abdominal pain      Interval History Status: Improving    HPI:     Initially admitted for management of abdominal pain, noted to have choledocholithiasis  9/18  Patient, clinically doing almost the same  Complaining of abdominal pain  NG in situ  Had episode of vomiting , during UGI STUDIES   Did not pass stools, flatus    Review of Systems:     Denies any shortness of breath or cough  Denies chest pain or palpitations  Appropriate postsurgical abdominal pain, no vomiting  Denies any new numbness tremors or weakness. Medications: Allergies:     Allergies   Allergen Reactions    Ace Inhibitors Nausea Only    Mixed Ragweed     Sulfa Antibiotics Nausea Only    Contrast [Iodides] Nausea And Vomiting    Pcn [Penicillins] Rash       Current Meds:   Scheduled Meds:    [Held by provider] sucralfate  1 g Oral 4 times per day    cefepime  2,000 mg IntraVENous Q24H    [Held by provider] aspirin  81 mg Oral Daily    [Held by provider] gabapentin  300 mg Oral BID    [Held by provider] losartan  50 mg Oral Daily    [Held by provider] pravastatin  40 mg Oral Daily    sodium chloride flush  5-40 mL IntraVENous 2 times per day    enoxaparin  40 mg SubCUTAneous Daily    metroNIDAZOLE  500 mg IntraVENous Q8H    pantoprazole (PROTONIX) 40 mg in sodium chloride (PF) 0.9 % 10 mL injection  40 mg IntraVENous Daily     Continuous Infusions:    dextrose 5 % and 0.45 % NaCl 75 mL/hr at 09/18/23 0208 9/15    9/16  Patient is on 3 L nasal cannula oxygen  She is awake alert oriented cooperative with exam  Blood pressure on the high side-as needed hydralazine added  Remains n.p.o., LFTs have downtrended since admission  Urine output has been good  Blood sugar controlled  Hemoglobin overall stable  Patient currently on cefepime and Flagyl via PICC line all oral medications have been held  Plan is for n.p.o. until Monday probably repeat EGD after  9/18. Awaiting upper GI studies  NG will be removed after that  On IV Rocephin  Family has a concern that patient will not able to go home, will have  talk to the family Will need arrangement to SNF placement  Liver functions are improving  Slightly elevated white count patient is already on antibiotic  Hemoglobin stable  Changing fluid to half-normal saline. Lovenox for DVT prophylaxis once okay with operating surgeon.   Awaiting results of UGI studies and X ray KUB   Has good bowel sounds    9/19   Patient, clinically doing much better today  Tolerating clear liquid diet  NG is out  On DVT prophylaxis Lovenox  Passing stools  Pain is controlled  Marylen Dress, MD  9/19/2023  9:58 AM

## 2023-09-19 NOTE — PROGRESS NOTES
[facility] -   Infectious diseases evaluation  admission date 9/12/2023    reason for consultation:   Bacteremia    Impression :   Current:  E. coli bacteremia, likely biliary source  Abdominal pain/elevated liver enzymes and biliary dilatation. Status post*upper GI endoscopy with EUS 9/15/2023  Perforated bowel status post repair of duodenal perforation 9/15/2023  Diabetes mellitus  Hypertension  Hyperlipidemia  History of breast cancer    Recommendations   Change IV cefepime to ceftriaxone 2g daily  through 9/26/2023  IV Flagyl m through 9/26/2023  Midline in place  Supportive care  Follow CBC and renal function    Infection Control Recommendations   [place] Precautions      Antimicrobial Stewardship Recommendations   Simplification of therapy  Targeted therapy      History of Present Illness:   Initial history:  Juan Luis Ocampo is a 82y.o.-year-old female who was transferred from [facility] 9/12/2023. She presented with right upper quadrant abdominal pain for several days associated with the nausea and vomiting. Symptoms moderate to severe, no alleviating or aggravating factors. CT abdomen pelvis completed at [facility] showed biliary duct dilatation with the common bile duct measuring 1.4 cm. Liver enzymes were elevated with ALT of 130, , WBC 12.2. Blood cultures done at [facility] grew E. coli that was sensitive to cefazolin and ceftriaxone. Gallbladder ultrasound showed gallbladder sludge with dilated common bile duct  GI consulted, MRCP was completed but limited due to motion artifact. She had EUS 7/43/6030, complicated by perforated bowel status post duodenal perforation repair 9/15/2023.   Interval changes  9/19/2023   She is awake, NG tube was removed, had an episode of vomiting earlier today, had 3 bowel movements, denied fever or chills, no other complaints,    Patient Vitals for the past 8 hrs:   BP Temp

## 2023-09-19 NOTE — PLAN OF CARE
Problem: Discharge Planning  Goal: Discharge to home or other facility with appropriate resources  Outcome: Progressing  Flowsheets (Taken 9/19/2023 0945)  Discharge to home or other facility with appropriate resources:   Identify barriers to discharge with patient and caregiver   Arrange for needed discharge resources and transportation as appropriate   Identify discharge learning needs (meds, wound care, etc)   Refer to discharge planning if patient needs post-hospital services based on physician order or complex needs related to functional status, cognitive ability or social support system     Problem: Safety - Adult  Goal: Free from fall injury  Outcome: Progressing     Problem: Pain  Goal: Verbalizes/displays adequate comfort level or baseline comfort level  Outcome: Progressing     Problem: Chronic Conditions and Co-morbidities  Goal: Patient's chronic conditions and co-morbidity symptoms are monitored and maintained or improved  Outcome: Progressing  Flowsheets (Taken 9/19/2023 0945)  Care Plan - Patient's Chronic Conditions and Co-Morbidity Symptoms are Monitored and Maintained or Improved:   Monitor and assess patient's chronic conditions and comorbid symptoms for stability, deterioration, or improvement   Collaborate with multidisciplinary team to address chronic and comorbid conditions and prevent exacerbation or deterioration   Update acute care plan with appropriate goals if chronic or comorbid symptoms are exacerbated and prevent overall improvement and discharge     Problem: Skin/Tissue Integrity  Goal: Absence of new skin breakdown  Description: 1. Monitor for areas of redness and/or skin breakdown  2. Assess vascular access sites hourly  3. Every 4-6 hours minimum:  Change oxygen saturation probe site  4. Every 4-6 hours:  If on nasal continuous positive airway pressure, respiratory therapy assess nares and determine need for appliance change or resting period.   Outcome: Progressing

## 2023-09-19 NOTE — PROGRESS NOTES
Comprehensive Nutrition Assessment    Type and Reason for Visit:  Consult (PN ordering and management)    Nutrition Recommendations/Plan:   TPN to start at 41.6 ml with 100 ml lipids. Additives: Na acetate- 35 mEq, K acetate- 25 mEq, K phos- 4 mmol, KCl- 10 mEq, Ca+- 3 mEq, Mg- 6 mEq, no MVI or trace elements. Malnutrition Assessment:  Malnutrition Status: At risk for malnutrition (Comment) (09/17/23 3600)    Context:  Acute Illness     Findings of the 6 clinical characteristics of malnutrition:  Energy Intake:  50% or less of estimated energy requirements for 5 or more days  Weight Loss:  Unable to assess     Body Fat Loss:  Unable to assess     Muscle Mass Loss:  Unable to assess    Fluid Accumulation:  Mild Extremities   Strength:  Not Performed    Nutrition Assessment:    Pt is POD#4 from duodenal repair. Pt is on clear liquid diet with surgery ordering TPN. Day 1 of TPN only at 41.6 ml with 100 ml lipids. Additives based on lab values. Nutrition Related Findings:    mild edema BUE, Labs/Meds: Reviewed, PMH: cancer, DM, GERD Wound Type: Surgical Incision       Current Nutrition Intake & Therapies:    Average Meal Intake: 1-25%     ADULT DIET; Clear Liquid  ADULT ORAL NUTRITION SUPPLEMENT; Breakfast, Lunch, Dinner; Clear Liquid Oral Supplement  PN-Adult Premix 5/20 - Central  Current Parenteral Nutrition Orders:  Type and Formula: 2-in-1 Custom   Lipids: 100ml  Duration: Continuous  Rate/Volume: 41.6 ml/ 998 ml  Current PN Order Provides: 1080 kcals, 50 gm protein      Anthropometric Measures:  Height: 5' (152.4 cm)  Ideal Body Weight (IBW): 100 lbs (45 kg)    Admission Body Weight: 130 lb (59 kg)  Current Body Weight: 130 lb (59 kg),   IBW. Weight Source: Bed Scale  Current BMI (kg/m2): 25.4                          BMI Categories: Overweight (BMI 25.0-29. 9)    Estimated Daily Nutrient Needs:  Energy Requirements Based On: Formula  Weight Used for Energy Requirements: Admission  Energy (kcal/day):

## 2023-09-19 NOTE — CARE COORDINATION
Writer spoke with Felipe Rainey at [facility] regarding referral.  Facility accepts. Writer placed call to pt cleve Lam to verify it is okay to start authorization, Dada agreeable. Vanessa Lam will be leaving [facility] and calls can be made to [facility] regarding discharge plans at [phone]. Electronically signed by MICKY Garcia on 9/19/2023 at 8:44 AM    Writer spoke to Felipe Rainey to confirm it was okay to start authorization.   Electronically signed by MICKY Garcia on 9/19/2023 at 2:26 PM

## 2023-09-19 NOTE — FLOWSHEET NOTE
09/19/23 0538   Treatment Team Notification   Reason for Communication Evaluate   Name of Team Member Notified Dr. Milo Leone Team Role Consulting Provider   Method of Communication Secure Message   Response No new orders     The following perfect serve was sent: Good morning, just wanted to give you an update. I have been emptying pt's ELENI every 3-4 hours and its about 70-80 mL each time. I've had 310mL of serosanguineous drainage in the last 12 hours. i hear bowel sounds and the patient is just complaining that she feels \"gasy\". thank you    No new orders at this time. States that serosang drainage is ok. Notify that patient is passing gas but still feel like there is more. Patient has been taking small sips of liquid, but has not had much intake this shift.

## 2023-09-19 NOTE — PROGRESS NOTES
Date:   9/19/2023  Patient name: Kristen Orona  Date of admission:  9/12/2023  3:31 AM  MRN:   [id number]  YOB: 1940  PCP: No primary care provider on file. Reason for Admission: Choledocholithiasis [K80.50]    Cardiology follow-up: Abnormal high-sensitivity troponin, elevated proBNP       Referring physician: Dr Ellie Jackson  Admission 9/12/23 with abdominal pain, fever and chills abnormal ALT AST, mildly dilated CBD  E. coli bacteremia, likely biliary source  9/15/2023 exploratory laparotomy, repair of duodenal perforation, EGD, drain placement  Gallbladder sludge  Elevated proBNP, borderline abnormal high-sensitivity troponin  Anemia, severe iron deficiency  Hypertension  Hyperlipidemia  Chronic back pain  Severe iron deficiency with anemia  History of breast cancer status post surgery     Investigation work-up     ECG 9/14/2023 -12:30 PM  Sinus rhythm, no ischemic ECG changes     MRI abdomen without contrast  Mild prominence of the common bile duct without intrahepatic biliary dilatation or convincing evidence for choledocholithiasis  Gallbladder ultrasound 9/12/2023  Borderline dilated common bile duct small right pleural effusion    2D echo 9/14/2023  Normal LV size mild LVH normal wall motion ejection fraction more than 55%  Grade 1 LV diastolic dysfunction  No significant valvular abnormality noted  No pericardial effusion  Normal IVC diameter and respiratory variation     History of present illness     82years old female with a past medical history of pretension, hyperlipidemia, breast cancer status postsurgery, gastroesophageal reflux disease sleep presented to [facility] with abdominal pain. Her symptoms started about a week prior to the admission. She felt better after avoiding fatty food. She had a relapse of symptoms again a day before admission that prompted ER visit.   CT abdomen showed 1.4 cm CBD dilation that prompted her transport to [facility]

## 2023-09-20 LAB
ALBUMIN SERPL-MCNC: 2.9 G/DL (ref 3.5–5.2)
ALP SERPL-CCNC: 48 U/L (ref 35–104)
ALT SERPL-CCNC: 21 U/L (ref 5–33)
ANION GAP SERPL CALCULATED.3IONS-SCNC: 8 MMOL/L (ref 9–17)
AST SERPL-CCNC: 13 U/L
BILIRUB DIRECT SERPL-MCNC: 0.1 MG/DL
BILIRUB INDIRECT SERPL-MCNC: 0.1 MG/DL (ref 0–1)
BILIRUB SERPL-MCNC: 0.2 MG/DL (ref 0.3–1.2)
BUN SERPL-MCNC: 15 MG/DL (ref 8–23)
CALCIUM SERPL-MCNC: 8.8 MG/DL (ref 8.6–10.4)
CHLORIDE SERPL-SCNC: 101 MMOL/L (ref 98–107)
CO2 SERPL-SCNC: 24 MMOL/L (ref 20–31)
CREAT SERPL-MCNC: 0.4 MG/DL (ref 0.5–0.9)
ERYTHROCYTE [DISTWIDTH] IN BLOOD BY AUTOMATED COUNT: 13.1 % (ref 11.5–14.9)
EST. AVERAGE GLUCOSE BLD GHB EST-MCNC: 128 MG/DL
GFR SERPL CREATININE-BSD FRML MDRD: >60 ML/MIN/1.73M2
GLUCOSE BLD-MCNC: 191 MG/DL (ref 65–105)
GLUCOSE BLD-MCNC: 210 MG/DL (ref 65–105)
GLUCOSE BLD-MCNC: 210 MG/DL (ref 65–105)
GLUCOSE BLD-MCNC: 215 MG/DL (ref 65–105)
GLUCOSE SERPL-MCNC: 201 MG/DL (ref 70–99)
HBA1C MFR BLD: 6.1 % (ref 4–6)
HCT VFR BLD AUTO: 26.6 % (ref 36–46)
HGB BLD-MCNC: 8.7 G/DL (ref 12–16)
MAGNESIUM SERPL-MCNC: 1.9 MG/DL (ref 1.6–2.6)
MCH RBC QN AUTO: 29.3 PG (ref 26–34)
MCHC RBC AUTO-ENTMCNC: 32.6 G/DL (ref 31–37)
MCV RBC AUTO: 89.9 FL (ref 80–100)
PHOSPHATE SERPL-MCNC: 2.6 MG/DL (ref 2.6–4.5)
PLATELET # BLD AUTO: 332 K/UL (ref 150–450)
PMV BLD AUTO: 8.2 FL (ref 6–12)
POTASSIUM SERPL-SCNC: 3.9 MMOL/L (ref 3.7–5.3)
PREALB SERPL-MCNC: 15.2 MG/DL (ref 20–40)
PROT SERPL-MCNC: 5.3 G/DL (ref 6.4–8.3)
RBC # BLD AUTO: 2.96 M/UL (ref 4–5.2)
SODIUM SERPL-SCNC: 133 MMOL/L (ref 135–144)
SURGICAL PATHOLOGY REPORT: NORMAL
TRIGL SERPL-MCNC: 133 MG/DL
WBC OTHER # BLD: 12.8 K/UL (ref 3.5–11)

## 2023-09-20 PROCEDURE — 84478 ASSAY OF TRIGLYCERIDES: CPT

## 2023-09-20 PROCEDURE — A4216 STERILE WATER/SALINE, 10 ML: HCPCS | Performed by: NURSE PRACTITIONER

## 2023-09-20 PROCEDURE — 6360000002 HC RX W HCPCS: Performed by: NURSE PRACTITIONER

## 2023-09-20 PROCEDURE — 2580000003 HC RX 258: Performed by: INTERNAL MEDICINE

## 2023-09-20 PROCEDURE — 2060000000 HC ICU INTERMEDIATE R&B

## 2023-09-20 PROCEDURE — 2580000003 HC RX 258: Performed by: SURGERY

## 2023-09-20 PROCEDURE — 6360000002 HC RX W HCPCS: Performed by: SURGERY

## 2023-09-20 PROCEDURE — 84134 ASSAY OF PREALBUMIN: CPT

## 2023-09-20 PROCEDURE — 2500000003 HC RX 250 WO HCPCS: Performed by: NURSE PRACTITIONER

## 2023-09-20 PROCEDURE — 6360000002 HC RX W HCPCS: Performed by: INTERNAL MEDICINE

## 2023-09-20 PROCEDURE — 99024 POSTOP FOLLOW-UP VISIT: CPT | Performed by: SURGERY

## 2023-09-20 PROCEDURE — C9113 INJ PANTOPRAZOLE SODIUM, VIA: HCPCS | Performed by: INTERNAL MEDICINE

## 2023-09-20 PROCEDURE — 97535 SELF CARE MNGMENT TRAINING: CPT

## 2023-09-20 PROCEDURE — 2500000003 HC RX 250 WO HCPCS: Performed by: INTERNAL MEDICINE

## 2023-09-20 PROCEDURE — 2500000003 HC RX 250 WO HCPCS: Performed by: SURGERY

## 2023-09-20 PROCEDURE — 6370000000 HC RX 637 (ALT 250 FOR IP): Performed by: NURSE PRACTITIONER

## 2023-09-20 PROCEDURE — 99232 SBSQ HOSP IP/OBS MODERATE 35: CPT | Performed by: INTERNAL MEDICINE

## 2023-09-20 PROCEDURE — 85027 COMPLETE CBC AUTOMATED: CPT

## 2023-09-20 PROCEDURE — 2580000003 HC RX 258: Performed by: NURSE PRACTITIONER

## 2023-09-20 PROCEDURE — 97530 THERAPEUTIC ACTIVITIES: CPT

## 2023-09-20 PROCEDURE — 83735 ASSAY OF MAGNESIUM: CPT

## 2023-09-20 PROCEDURE — 84100 ASSAY OF PHOSPHORUS: CPT

## 2023-09-20 PROCEDURE — 80048 BASIC METABOLIC PNL TOTAL CA: CPT

## 2023-09-20 PROCEDURE — A4216 STERILE WATER/SALINE, 10 ML: HCPCS | Performed by: INTERNAL MEDICINE

## 2023-09-20 PROCEDURE — 6370000000 HC RX 637 (ALT 250 FOR IP): Performed by: SURGERY

## 2023-09-20 PROCEDURE — 80076 HEPATIC FUNCTION PANEL: CPT

## 2023-09-20 PROCEDURE — 97110 THERAPEUTIC EXERCISES: CPT

## 2023-09-20 PROCEDURE — 97116 GAIT TRAINING THERAPY: CPT

## 2023-09-20 RX ORDER — SCOLOPAMINE TRANSDERMAL SYSTEM 1 MG/1
1 PATCH, EXTENDED RELEASE TRANSDERMAL
Status: COMPLETED | OUTPATIENT
Start: 2023-09-20 | End: 2023-09-29

## 2023-09-20 RX ORDER — LIDOCAINE 4 G/G
1 PATCH TOPICAL DAILY
Status: DISCONTINUED | OUTPATIENT
Start: 2023-09-20 | End: 2023-09-29 | Stop reason: HOSPADM

## 2023-09-20 RX ORDER — LIDOCAINE 4 G/G
1 PATCH TOPICAL DAILY
Status: DISCONTINUED | OUTPATIENT
Start: 2023-09-20 | End: 2023-09-20

## 2023-09-20 RX ADMIN — METOPROLOL TARTRATE 5 MG: 5 INJECTION, SOLUTION INTRAVENOUS at 14:59

## 2023-09-20 RX ADMIN — SODIUM CHLORIDE, PRESERVATIVE FREE 10 ML: 5 INJECTION INTRAVENOUS at 09:25

## 2023-09-20 RX ADMIN — SODIUM CHLORIDE, PRESERVATIVE FREE 40 MG: 5 INJECTION INTRAVENOUS at 09:19

## 2023-09-20 RX ADMIN — METOPROLOL TARTRATE 5 MG: 5 INJECTION, SOLUTION INTRAVENOUS at 09:19

## 2023-09-20 RX ADMIN — FENTANYL CITRATE 50 MCG: 0.05 INJECTION, SOLUTION INTRAMUSCULAR; INTRAVENOUS at 01:03

## 2023-09-20 RX ADMIN — POTASSIUM PHOSPHATE, MONOBASIC POTASSIUM PHOSPHATE, DIBASIC: 224; 236 INJECTION, SOLUTION, CONCENTRATE INTRAVENOUS at 18:37

## 2023-09-20 RX ADMIN — METOPROLOL TARTRATE 5 MG: 5 INJECTION, SOLUTION INTRAVENOUS at 20:34

## 2023-09-20 RX ADMIN — METRONIDAZOLE 500 MG: 500 INJECTION, SOLUTION INTRAVENOUS at 18:43

## 2023-09-20 RX ADMIN — ENOXAPARIN SODIUM 40 MG: 100 INJECTION SUBCUTANEOUS at 09:20

## 2023-09-20 RX ADMIN — FENTANYL CITRATE 50 MCG: 0.05 INJECTION, SOLUTION INTRAMUSCULAR; INTRAVENOUS at 15:09

## 2023-09-20 RX ADMIN — FENTANYL CITRATE 50 MCG: 0.05 INJECTION, SOLUTION INTRAMUSCULAR; INTRAVENOUS at 09:19

## 2023-09-20 RX ADMIN — INSULIN LISPRO 1 UNITS: 100 INJECTION, SOLUTION INTRAVENOUS; SUBCUTANEOUS at 12:23

## 2023-09-20 RX ADMIN — METOPROLOL TARTRATE 5 MG: 5 INJECTION, SOLUTION INTRAVENOUS at 03:24

## 2023-09-20 RX ADMIN — METRONIDAZOLE 500 MG: 500 INJECTION, SOLUTION INTRAVENOUS at 12:22

## 2023-09-20 RX ADMIN — INSULIN LISPRO 1 UNITS: 100 INJECTION, SOLUTION INTRAVENOUS; SUBCUTANEOUS at 06:48

## 2023-09-20 RX ADMIN — FENTANYL CITRATE 50 MCG: 0.05 INJECTION, SOLUTION INTRAMUSCULAR; INTRAVENOUS at 20:45

## 2023-09-20 RX ADMIN — POTASSIUM CHLORIDE, DEXTROSE MONOHYDRATE AND SODIUM CHLORIDE: 300; 5; 450 INJECTION, SOLUTION INTRAVENOUS at 06:34

## 2023-09-20 RX ADMIN — FAMOTIDINE 20 MG: 10 INJECTION, SOLUTION INTRAVENOUS at 01:03

## 2023-09-20 RX ADMIN — INSULIN LISPRO 1 UNITS: 100 INJECTION, SOLUTION INTRAVENOUS; SUBCUTANEOUS at 18:38

## 2023-09-20 RX ADMIN — METRONIDAZOLE 500 MG: 500 INJECTION, SOLUTION INTRAVENOUS at 03:28

## 2023-09-20 RX ADMIN — FENTANYL CITRATE 50 MCG: 0.05 INJECTION, SOLUTION INTRAMUSCULAR; INTRAVENOUS at 03:24

## 2023-09-20 RX ADMIN — CEFTRIAXONE SODIUM 2000 MG: 2 INJECTION, POWDER, FOR SOLUTION INTRAMUSCULAR; INTRAVENOUS at 18:44

## 2023-09-20 ASSESSMENT — PAIN DESCRIPTION - LOCATION
LOCATION: ABDOMEN;NECK
LOCATION: ABDOMEN
LOCATION: ABDOMEN;NECK
LOCATION: ABDOMEN
LOCATION: ABDOMEN;CHEST

## 2023-09-20 ASSESSMENT — PAIN DESCRIPTION - ORIENTATION
ORIENTATION: MID
ORIENTATION: MID

## 2023-09-20 ASSESSMENT — PAIN SCALES - GENERAL
PAINLEVEL_OUTOF10: 10
PAINLEVEL_OUTOF10: 10
PAINLEVEL_OUTOF10: 7
PAINLEVEL_OUTOF10: 7
PAINLEVEL_OUTOF10: 3
PAINLEVEL_OUTOF10: 10
PAINLEVEL_OUTOF10: 8

## 2023-09-20 ASSESSMENT — PAIN DESCRIPTION - DESCRIPTORS
DESCRIPTORS: ACHING
DESCRIPTORS: ACHING

## 2023-09-20 ASSESSMENT — PAIN DESCRIPTION - FREQUENCY
FREQUENCY: CONTINUOUS
FREQUENCY: CONTINUOUS

## 2023-09-20 ASSESSMENT — PAIN DESCRIPTION - PAIN TYPE
TYPE: SURGICAL PAIN
TYPE: SURGICAL PAIN

## 2023-09-20 NOTE — PLAN OF CARE
Problem: Discharge Planning  Goal: Discharge to home or other facility with appropriate resources  9/20/2023 1637 by Sulema Sanchez RN  Outcome: Progressing     Problem: Safety - Adult  Goal: Free from fall injury  9/20/2023 1637 by Sulema Sanchez RN  Outcome: Progressing     Problem: Pain  Goal: Verbalizes/displays adequate comfort level or baseline comfort level  9/20/2023 1637 by Sulema Sanchez RN  Outcome: Progressing     Problem: Chronic Conditions and Co-morbidities  Goal: Patient's chronic conditions and co-morbidity symptoms are monitored and maintained or improved  9/20/2023 1637 by Sulema Sanchez RN  Outcome: Progressing     Problem: Skin/Tissue Integrity  Goal: Absence of new skin breakdown  Description: 1. Monitor for areas of redness and/or skin breakdown  2. Assess vascular access sites hourly  3. Every 4-6 hours minimum:  Change oxygen saturation probe site  4. Every 4-6 hours:  If on nasal continuous positive airway pressure, respiratory therapy assess nares and determine need for appliance change or resting period.   9/20/2023 1637 by Sulmea Sanchez RN  Outcome: Progressing     Problem: ABCDS Injury Assessment  Goal: Absence of physical injury  9/20/2023 1637 by Sulema Sanchez RN  Outcome: Progressing     Problem: Nutrition Deficit:  Goal: Optimize nutritional status  9/20/2023 1637 by Sulema Sanchez RN  Outcome: Progressing

## 2023-09-20 NOTE — CARE COORDINATION
Writer received call from [facility] at [address]. Peer to peer option available for this pt. Reference # [id number]  [phone] option 4, schedule by 1200. Writer placed call to Dr. Carina Lambert regarding peer to peer option available. Electronically signed by MICKY Lr on 9/20/2023 at 11:04 AM    Writer received message from LORRAINE CASTILLO lead Mary that peer to peer was approved for 24 hours per Dr. Carina Lambert.   Electronically signed by MICKY Lr on 9/20/2023 at 1:56 PM

## 2023-09-20 NOTE — PROGRESS NOTES
RN spoke with Phil Bentley NP on unit. Patient is c/o a stiff neck and acid reflex. NP placed orders for a lidocaine patch and one time dose of Pepcid IV. Will continue to monitor.

## 2023-09-20 NOTE — PROGRESS NOTES
Comprehensive Nutrition Assessment    Type and Reason for Visit:  Reassess    Nutrition Recommendations/Plan:   TPN increased to 70 ml/hr without lipids. Following changes made to additives: Na acetate- 35 to 65 mEq, NaCl- 15 mEq, K acetate- 25 to 50 mEq., K phos- 4 to 30 mmol, KCl- 10 to 25 mEq, Ca+- 3 to 4 mEq, Mg- 3 to 10 mEq, insulin- 20 units, MVI & trace elements. Malnutrition Assessment:  Malnutrition Status: At risk for malnutrition (Comment) (09/17/23 0812)    Context:  Acute Illness     Findings of the 6 clinical characteristics of malnutrition:  Energy Intake:  50% or less of estimated energy requirements for 5 or more days  Weight Loss:  Unable to assess     Body Fat Loss:  Unable to assess     Muscle Mass Loss:  Unable to assess    Fluid Accumulation:  Mild Extremities   Strength:  Not Performed    Nutrition Assessment:    Pt continues on clear liquid diet. Informed RN plan to increase TPN to 70 ml/hr, check with surgery if other IV can be discontinued. Nutrition Related Findings:    mild edema BUE, Labs/Meds: Reviewed, PMH: cancer, DM, GERD Wound Type: Surgical Incision       Current Nutrition Intake & Therapies:    Average Meal Intake: 1-25%     Current Parenteral Nutrition Orders:  Type and Formula: 2-in-1 Custom   Lipids: None  Duration: Continuous  Rate/Volume: 70 ml/ 1680 ml  Current PN Order Provides: 1478 kcals, 84 gm protein    Anthropometric Measures:  Height: 5' (152.4 cm)  Ideal Body Weight (IBW): 100 lbs (45 kg)    Admission Body Weight: 130 lb (59 kg)  Current Body Weight: 130 lb (59 kg),   IBW. Weight Source: Bed Scale  Current BMI (kg/m2): 25.4                          BMI Categories: Overweight (BMI 25.0-29. 9)    Estimated Daily Nutrient Needs:  Energy Requirements Based On: Kcal/kg  Weight Used for Energy Requirements: Admission  Energy (kcal/day): 1475 kcals based on 25 kcals/kg (revised)  Weight Used for Protein Requirements: Admission  Protein (g/day): 1.5g/kg= 85-90

## 2023-09-20 NOTE — PROGRESS NOTES
Hypertension     Spinal stenosis     Spondylolysis        Past Surgical  History:     Past Surgical History:   Procedure Laterality Date    BACK SURGERY      BREAST SURGERY      JOINT REPLACEMENT      LAPAROTOMY N/A 9/15/2023    LAPAROTOMY EXPLORATORY Drain placement performed by Roel Carrasco MD at  [address]  9/15/2023    EGD ESOPHAGOGASTRODUODENOSCOPY performed by Roel Carrasco MD at [address] [facility] N/A 9/15/2023    EGD BIOPSY EUS ULTRASOUND LINEAR performed by Lillian Powell MD at [address] OR       Medications:      lidocaine  1 patch TransDERmal Daily    scopolamine  1 patch TransDERmal Q72H    insulin lispro  0-4 Units SubCUTAneous Q6H    metoprolol  5 mg IntraVENous Q6H    cefTRIAXone (ROCEPHIN) IV  2,000 mg IntraVENous Q24H    [Held by provider] sucralfate  1 g Oral 4 times per day    [Held by provider] aspirin  81 mg Oral Daily    [Held by provider] gabapentin  300 mg Oral BID    [Held by provider] losartan  50 mg Oral Daily    [Held by provider] pravastatin  40 mg Oral Daily    sodium chloride flush  5-40 mL IntraVENous 2 times per day    enoxaparin  40 mg SubCUTAneous Daily    metroNIDAZOLE  500 mg IntraVENous Q8H    pantoprazole (PROTONIX) 40 mg in sodium chloride (PF) 0.9 % 10 mL injection  40 mg IntraVENous Daily       Social History:     Social History     Socioeconomic History    Marital status: Unknown     Spouse name: Not on file    Number of children: Not on file    Years of education: Not on file    Highest education level: Not on file   Occupational History    Not on file   Tobacco Use    Smoking status: Never    Smokeless tobacco: Never   Substance and Sexual Activity    Alcohol use: Not on file    Drug use: Not on file    Sexual activity: Not on file   Other Topics Concern    Not on file   Social History Narrative    Not on file     Social Determinants of Health     Financial Resource Strain: Not on file   Food Insecurity: Not on file   Transportation Needs: Not on file   Physical Activity: Not on file   Stress: Not on file   Social Connections: Not on file   Intimate Partner Violence: Not on file   Housing Stability: Not on file       Family History:   History reviewed. No pertinent family history. Medical Decision Making:   I have independently reviewed/ordered the following labs:    CBC with Differential:   Recent Labs     09/18/23  0538 09/19/23  1138 09/20/23  0500   WBC 12.3* 13.1* 12.8*   HGB 8.6* 8.5* 8.7*   HCT 26.3* 27.1* 26.6*    325 332   LYMPHOPCT 19*  --   --    MONOPCT 5  --   --        BMP:  Recent Labs     09/19/23  1138 09/20/23  0500   * 133*   K 3.3* 3.9    101   CO2 20 24   BUN 16 15   CREATININE 0.5 0.4*   MG 1.7 1.9       Hepatic Function Panel:   Recent Labs     09/18/23  0538 09/20/23  0500   PROT 5.4* 5.3*   LABALBU 2.9* 2.9*   BILIDIR 0.1 0.1   IBILI 0.2 0.1   BILITOT 0.3 0.2*   ALKPHOS 62 48   ALT 33 21   AST 20 13       No results for input(s): \"RPR\" in the last 72 hours. No results for input(s): \"HIV\" in the last 72 hours. No results for input(s): \"BC\" in the last 72 hours. Lab Results   Component Value Date/Time    CREATININE 0.4 09/20/2023 05:00 AM    GLUCOSE 201 09/20/2023 05:00 AM       Detailed results: Thank you for allowing us to participate in the care of this patient. Please call with questions. This note is created with the assistance of a speech recognition program.  While intending to generate adocument that actually reflects the content of the visit, the document can still have some errors including those of syntax and sound a like substitutions which may escape proof reading. It such instances, actual meaningcan be extrapolated by contextual diversion.     Simón Villanueva MD  Office: [phone]  Perfect serve / office [phone]

## 2023-09-20 NOTE — PROGRESS NOTES
[address] [facility] Internal Medicine  [facility] Internal Medicine  Alethea Lopez MD; Lady Baldev MD; Carol Torres MD; MD Evelyne Smith MD; Tanvir Boyce MD; Jaylan Joy MD        Progress Note    9/20/2023    9:57 AM    Name:   Valentina Rocha  MRN:     [id number]     Acct:      [de-identified]   Room:   2114/2114-01   Day:  8  Admit Date:  9/12/2023  3:31 AM    PCP:   No primary care provider on file. Code Status:  Full Code    Subjective:     C/C: No chief complaint on file. Abdominal pain      Interval History Status: Improving    HPI:     Initially admitted for management of abdominal pain, noted to have choledocholithiasis  9/18  Patient, clinically doing almost the same  Complaining of abdominal pain  NG in situ  Had episode of vomiting , during UGI STUDIES   Did not pass stools, flatus  9/20   Patient, clinically doing better  Working with therapy  On IV antibiotic  Passing stools  On TPN  Review of Systems:     Denies any shortness of breath or cough  Denies chest pain or palpitations  Appropriate postsurgical abdominal pain, no vomiting  Denies any new numbness tremors or weakness. Medications: Allergies:     Allergies   Allergen Reactions    Ace Inhibitors Nausea Only    Mixed Ragweed     Sulfa Antibiotics Nausea Only    Contrast [Iodides] Nausea And Vomiting    Pcn [Penicillins] Rash       Current Meds:   Scheduled Meds:    lidocaine  1 patch TransDERmal Daily    insulin lispro  0-4 Units SubCUTAneous Q6H    fat emulsion  100 mL IntraVENous Daily    metoprolol  5 mg IntraVENous Q6H    cefTRIAXone (ROCEPHIN) IV  2,000 mg IntraVENous Q24H    [Held by provider] sucralfate  1 g Oral 4 times per day    [Held by provider] aspirin  81 mg Oral Daily    [Held by provider] gabapentin  300 mg Oral BID    [Held by provider] losartan  50 mg Oral Daily    [Held by provider] pravastatin  40 mg Oral Daily    sodium chloride flush  5-40 mL IntraVENous 2

## 2023-09-20 NOTE — PROGRESS NOTES
[address] [facility]   INPATIENT OCCUPATIONAL THERAPY  PROGRESS NOTE  Date: 2023  Patient Name: Monica Serna       Room: /2114-01  MRN: [id number]    : 1940  (82 y.o.)  Gender: female   Referring Practitioner: Elyssa Lomeli MD  Diagnosis: Choledocholithiasis      Discharge Recommendations:  Further Occupational Therapy is recommended upon facility discharge. OT Equipment Recommendations  Other: TBD    Restrictions/Precautions  Restrictions/Precautions  Restrictions/Precautions: Fall Risk  Required Braces or Orthoses?: No  Implants present? : Metal implants (right TKR)  Position Activity Restriction  Other position/activity restrictions: NO BP, IV sticks or venipunctures right UE. Subjective  Subjective  Subjective: \"I'm the smartest\" Pt is very pleasant and cooperative for therapy . Subjective  Pain: Pt does no rate pain but grimaces when coughing and spitting up. Comments: LORRAINE terrell's    Objective  Orientation  Overall Orientation Status: Within Functional Limits  Orientation Level: Oriented to person;Oriented to place;Oriented to time;Disoriented to situation  Cognition  Overall Cognitive Status: Exceptions  Arousal/Alertness: Delayed responses to stimuli  Following Commands: Follows one step commands with increased time; Follows one step commands with repetition  Attention Span: Attends with cues to redirect  Safety Judgement: Decreased awareness of need for assistance;Decreased awareness of need for safety  Problem Solving: Assistance required to generate solutions;Assistance required to implement solutions;Assistance required to identify errors made;Assistance required to correct errors made  Insights: Decreased awareness of deficits  Initiation: Requires cues for some  Sequencing: Requires cues for some    Activities of Daily Living  ADL  Feeding: Stand by assistance (liquid diet)  Grooming: Stand by assistance  UE Bathing: Minimal assistance  LE Bathing: Maximum

## 2023-09-20 NOTE — PROGRESS NOTES
walker  Short Term Goal 6: pt to demonstrate good ambulatory balance using wheeled walker  Short Term Goal 7: pt to demonstrate ability to ascend/ descend 5-6\" platform step using AD w/ min x 1  Short Term Goal 8: pt to demonstrate increased LE strength by 1/2 MMG to assist w/ transfers  Patient Goals   Patient Goals : get stronger    Education  Patient Education  Education Given To: Patient; Family  Education Provided: Role of Therapy;Plan of Care; Fall Prevention Strategies; Home Exercise Program;Transfer Training  Education Provided Comments: Elevating B LE for pressure relief.   Education Method: Demonstration;Verbal  Barriers to Learning: None  Education Outcome: Verbalized understanding;Demonstrated understanding    AM-PAC Basic Mobility - Inpatient   How much help is needed turning from your back to your side while in a flat bed without using bedrails?: A Little  How much help is needed moving from lying on your back to sitting on the side of a flat bed without using bedrails?: A Little  How much help is needed moving to and from a bed to a chair?: A Little  How much help is needed standing up from a chair using your arms?: A Little  How much help is needed walking in hospital room?: A Little  How much help is needed climbing 3-5 steps with a railing?: A Lot  AM-PAC Inpatient Mobility Raw Score : 17  AM-PAC Inpatient T-Scale Score : 42.13  Mobility Inpatient CMS 0-100% Score: 50.57  Mobility Inpatient CMS G-Code Modifier : CK     Therapy Time   Individual Concurrent Group Co-treatment   Time In 0930         Time Out 1015         Minutes [facility]

## 2023-09-20 NOTE — PLAN OF CARE
Problem: Safety - Adult  Goal: Free from fall injury  9/20/2023 0416 by Rebekah Walker RN  Outcome: Progressing     Problem: Pain  Goal: Verbalizes/displays adequate comfort level or baseline comfort level  9/20/2023 0416 by Rebekah Walker RN  Outcome: Progressing  Pt medicated with pain medication prn. Assessed all pain characteristics including level, type, location, frequency, and onset. Non-pharmacologic interventions offered to pt as well. Will continue to monitor.       Problem: Chronic Conditions and Co-morbidities  Goal: Patient's chronic conditions and co-morbidity symptoms are monitored and maintained or improved  9/20/2023 0416 by Rebekah Walker RN  Outcome: Progressing     Problem: Nutrition Deficit:  Goal: Optimize nutritional status  9/20/2023 0416 by Rebekah Walker RN  Outcome: Progressing     Problem: Discharge Planning  Goal: Discharge to home or other facility with appropriate resources  9/20/2023 0416 by Rebekah Walker RN  Outcome: Progressing

## 2023-09-20 NOTE — PROGRESS NOTES
09/20/23 1411   Encounter Summary   Encounter Overview/Reason  Attempted Encounter   Service Provided For: Patient not available  (patient busy)

## 2023-09-21 LAB
ALBUMIN SERPL-MCNC: 2.6 G/DL (ref 3.5–5.2)
ALBUMIN SERPL-MCNC: NORMAL G/DL (ref 3.5–5.2)
ALP SERPL-CCNC: 45 U/L (ref 35–104)
ALP SERPL-CCNC: NORMAL U/L (ref 35–104)
ALT SERPL-CCNC: 17 U/L (ref 5–33)
ALT SERPL-CCNC: NORMAL U/L (ref 5–33)
ANION GAP SERPL CALCULATED.3IONS-SCNC: 4 MMOL/L (ref 9–17)
ANION GAP SERPL CALCULATED.3IONS-SCNC: NORMAL MMOL/L (ref 9–17)
AST SERPL-CCNC: 12 U/L
AST SERPL-CCNC: NORMAL U/L
BILIRUB DIRECT SERPL-MCNC: 0.1 MG/DL
BILIRUB DIRECT SERPL-MCNC: NORMAL MG/DL
BILIRUB INDIRECT SERPL-MCNC: 0.1 MG/DL (ref 0–1)
BILIRUB SERPL-MCNC: 0.2 MG/DL (ref 0.3–1.2)
BILIRUB SERPL-MCNC: NORMAL MG/DL (ref 0.3–1.2)
BUN SERPL-MCNC: 15 MG/DL (ref 8–23)
BUN SERPL-MCNC: NORMAL MG/DL (ref 8–23)
CALCIUM SERPL-MCNC: 9.3 MG/DL (ref 8.6–10.4)
CALCIUM SERPL-MCNC: NORMAL MG/DL (ref 8.6–10.4)
CHLORIDE SERPL-SCNC: 98 MMOL/L (ref 98–107)
CHLORIDE SERPL-SCNC: NORMAL MMOL/L (ref 98–107)
CO2 SERPL-SCNC: 29 MMOL/L (ref 20–31)
CO2 SERPL-SCNC: NORMAL MMOL/L (ref 20–31)
CREAT SERPL-MCNC: 0.5 MG/DL (ref 0.5–0.9)
CREAT SERPL-MCNC: NORMAL MG/DL (ref 0.5–0.9)
EKG ATRIAL RATE: 97 BPM
EKG P AXIS: 48 DEGREES
EKG P-R INTERVAL: 154 MS
EKG Q-T INTERVAL: 350 MS
EKG QRS DURATION: 66 MS
EKG QTC CALCULATION (BAZETT): 444 MS
EKG R AXIS: -18 DEGREES
EKG T AXIS: 37 DEGREES
EKG VENTRICULAR RATE: 97 BPM
GFR SERPL CREATININE-BSD FRML MDRD: >60 ML/MIN/1.73M2
GFR SERPL CREATININE-BSD FRML MDRD: NORMAL ML/MIN/1.73M2
GLUCOSE BLD-MCNC: 162 MG/DL (ref 65–105)
GLUCOSE BLD-MCNC: 167 MG/DL (ref 65–105)
GLUCOSE BLD-MCNC: 175 MG/DL (ref 65–105)
GLUCOSE BLD-MCNC: 195 MG/DL (ref 65–105)
GLUCOSE BLD-MCNC: 263 MG/DL (ref 65–105)
GLUCOSE SERPL-MCNC: 185 MG/DL (ref 70–99)
MAGNESIUM SERPL-MCNC: 1.9 MG/DL (ref 1.6–2.6)
MAGNESIUM SERPL-MCNC: NORMAL MG/DL (ref 1.6–2.6)
PHOSPHATE SERPL-MCNC: 2.3 MG/DL (ref 2.6–4.5)
PHOSPHATE SERPL-MCNC: NORMAL MG/DL (ref 2.6–4.5)
POTASSIUM SERPL-SCNC: 4.2 MMOL/L (ref 3.7–5.3)
PROT SERPL-MCNC: 5.3 G/DL (ref 6.4–8.3)
PROT SERPL-MCNC: NORMAL G/DL (ref 6.4–8.3)
SODIUM SERPL-SCNC: 131 MMOL/L (ref 135–144)
SODIUM SERPL-SCNC: NORMAL MMOL/L (ref 135–144)

## 2023-09-21 PROCEDURE — 2580000003 HC RX 258: Performed by: INTERNAL MEDICINE

## 2023-09-21 PROCEDURE — 6360000002 HC RX W HCPCS: Performed by: SURGERY

## 2023-09-21 PROCEDURE — 84100 ASSAY OF PHOSPHORUS: CPT

## 2023-09-21 PROCEDURE — 36415 COLL VENOUS BLD VENIPUNCTURE: CPT

## 2023-09-21 PROCEDURE — 6360000002 HC RX W HCPCS: Performed by: NURSE PRACTITIONER

## 2023-09-21 PROCEDURE — 2580000003 HC RX 258: Performed by: SURGERY

## 2023-09-21 PROCEDURE — 82947 ASSAY GLUCOSE BLOOD QUANT: CPT

## 2023-09-21 PROCEDURE — 2060000000 HC ICU INTERMEDIATE R&B

## 2023-09-21 PROCEDURE — 80048 BASIC METABOLIC PNL TOTAL CA: CPT

## 2023-09-21 PROCEDURE — 6370000000 HC RX 637 (ALT 250 FOR IP): Performed by: INTERNAL MEDICINE

## 2023-09-21 PROCEDURE — A4216 STERILE WATER/SALINE, 10 ML: HCPCS | Performed by: INTERNAL MEDICINE

## 2023-09-21 PROCEDURE — 6360000002 HC RX W HCPCS: Performed by: INTERNAL MEDICINE

## 2023-09-21 PROCEDURE — 99024 POSTOP FOLLOW-UP VISIT: CPT | Performed by: SURGERY

## 2023-09-21 PROCEDURE — 80076 HEPATIC FUNCTION PANEL: CPT

## 2023-09-21 PROCEDURE — 99232 SBSQ HOSP IP/OBS MODERATE 35: CPT | Performed by: INTERNAL MEDICINE

## 2023-09-21 PROCEDURE — 2500000003 HC RX 250 WO HCPCS: Performed by: INTERNAL MEDICINE

## 2023-09-21 PROCEDURE — C9113 INJ PANTOPRAZOLE SODIUM, VIA: HCPCS | Performed by: INTERNAL MEDICINE

## 2023-09-21 PROCEDURE — 83735 ASSAY OF MAGNESIUM: CPT

## 2023-09-21 PROCEDURE — 6370000000 HC RX 637 (ALT 250 FOR IP): Performed by: SURGERY

## 2023-09-21 PROCEDURE — 2500000003 HC RX 250 WO HCPCS: Performed by: SURGERY

## 2023-09-21 PROCEDURE — 99233 SBSQ HOSP IP/OBS HIGH 50: CPT | Performed by: INTERNAL MEDICINE

## 2023-09-21 RX ORDER — METOCLOPRAMIDE HYDROCHLORIDE 5 MG/ML
5 INJECTION INTRAMUSCULAR; INTRAVENOUS EVERY 6 HOURS
Status: COMPLETED | OUTPATIENT
Start: 2023-09-21 | End: 2023-09-22

## 2023-09-21 RX ORDER — INSULIN LISPRO 100 [IU]/ML
0-8 INJECTION, SOLUTION INTRAVENOUS; SUBCUTANEOUS
Status: DISCONTINUED | OUTPATIENT
Start: 2023-09-21 | End: 2023-09-29 | Stop reason: HOSPADM

## 2023-09-21 RX ORDER — INSULIN LISPRO 100 [IU]/ML
0-4 INJECTION, SOLUTION INTRAVENOUS; SUBCUTANEOUS NIGHTLY
Status: DISCONTINUED | OUTPATIENT
Start: 2023-09-21 | End: 2023-09-29 | Stop reason: HOSPADM

## 2023-09-21 RX ADMIN — SODIUM CHLORIDE, PRESERVATIVE FREE 10 ML: 5 INJECTION INTRAVENOUS at 21:40

## 2023-09-21 RX ADMIN — SODIUM CHLORIDE, PRESERVATIVE FREE 10 ML: 5 INJECTION INTRAVENOUS at 09:59

## 2023-09-21 RX ADMIN — FENTANYL CITRATE 50 MCG: 0.05 INJECTION, SOLUTION INTRAMUSCULAR; INTRAVENOUS at 13:15

## 2023-09-21 RX ADMIN — CEFTRIAXONE SODIUM 2000 MG: 2 INJECTION, POWDER, FOR SOLUTION INTRAMUSCULAR; INTRAVENOUS at 21:37

## 2023-09-21 RX ADMIN — METOPROLOL TARTRATE 5 MG: 5 INJECTION, SOLUTION INTRAVENOUS at 09:47

## 2023-09-21 RX ADMIN — GABAPENTIN 300 MG: 300 CAPSULE ORAL at 21:40

## 2023-09-21 RX ADMIN — FENTANYL CITRATE 50 MCG: 0.05 INJECTION, SOLUTION INTRAMUSCULAR; INTRAVENOUS at 20:29

## 2023-09-21 RX ADMIN — INSULIN LISPRO 2 UNITS: 100 INJECTION, SOLUTION INTRAVENOUS; SUBCUTANEOUS at 13:02

## 2023-09-21 RX ADMIN — LOSARTAN POTASSIUM 50 MG: 50 TABLET, FILM COATED ORAL at 15:09

## 2023-09-21 RX ADMIN — METRONIDAZOLE 500 MG: 500 INJECTION, SOLUTION INTRAVENOUS at 19:52

## 2023-09-21 RX ADMIN — METOPROLOL TARTRATE 5 MG: 5 INJECTION, SOLUTION INTRAVENOUS at 02:31

## 2023-09-21 RX ADMIN — GABAPENTIN 300 MG: 300 CAPSULE ORAL at 15:09

## 2023-09-21 RX ADMIN — METRONIDAZOLE 500 MG: 500 INJECTION, SOLUTION INTRAVENOUS at 11:43

## 2023-09-21 RX ADMIN — CALCIUM GLUCONATE: 98 INJECTION, SOLUTION INTRAVENOUS at 18:57

## 2023-09-21 RX ADMIN — SODIUM CHLORIDE: 9 INJECTION, SOLUTION INTRAVENOUS at 19:48

## 2023-09-21 RX ADMIN — PRAVASTATIN SODIUM 40 MG: 40 TABLET ORAL at 15:10

## 2023-09-21 RX ADMIN — ONDANSETRON 4 MG: 2 INJECTION INTRAMUSCULAR; INTRAVENOUS at 10:12

## 2023-09-21 RX ADMIN — FENTANYL CITRATE 50 MCG: 0.05 INJECTION, SOLUTION INTRAMUSCULAR; INTRAVENOUS at 00:41

## 2023-09-21 RX ADMIN — METOPROLOL TARTRATE 5 MG: 5 INJECTION, SOLUTION INTRAVENOUS at 21:40

## 2023-09-21 RX ADMIN — METOPROLOL TARTRATE 5 MG: 5 INJECTION, SOLUTION INTRAVENOUS at 16:44

## 2023-09-21 RX ADMIN — ONDANSETRON 4 MG: 2 INJECTION INTRAMUSCULAR; INTRAVENOUS at 00:24

## 2023-09-21 RX ADMIN — FENTANYL CITRATE 50 MCG: 0.05 INJECTION, SOLUTION INTRAMUSCULAR; INTRAVENOUS at 10:06

## 2023-09-21 RX ADMIN — SODIUM CHLORIDE, PRESERVATIVE FREE 40 MG: 5 INJECTION INTRAVENOUS at 09:53

## 2023-09-21 RX ADMIN — ONDANSETRON 4 MG: 2 INJECTION INTRAMUSCULAR; INTRAVENOUS at 00:41

## 2023-09-21 RX ADMIN — METOCLOPRAMIDE HYDROCHLORIDE 5 MG: 5 INJECTION INTRAMUSCULAR; INTRAVENOUS at 13:12

## 2023-09-21 RX ADMIN — METOCLOPRAMIDE HYDROCHLORIDE 5 MG: 5 INJECTION INTRAMUSCULAR; INTRAVENOUS at 19:48

## 2023-09-21 RX ADMIN — PRAVASTATIN SODIUM 40 MG: 40 TABLET ORAL at 21:40

## 2023-09-21 RX ADMIN — ENOXAPARIN SODIUM 40 MG: 100 INJECTION SUBCUTANEOUS at 09:57

## 2023-09-21 RX ADMIN — METRONIDAZOLE 500 MG: 500 INJECTION, SOLUTION INTRAVENOUS at 02:34

## 2023-09-21 ASSESSMENT — PAIN DESCRIPTION - LOCATION
LOCATION: ABDOMEN
LOCATION: ABDOMEN;THROAT
LOCATION: ABDOMEN
LOCATION: ABDOMEN

## 2023-09-21 ASSESSMENT — PAIN DESCRIPTION - PAIN TYPE: TYPE: SURGICAL PAIN

## 2023-09-21 ASSESSMENT — PAIN DESCRIPTION - ORIENTATION: ORIENTATION: MID

## 2023-09-21 ASSESSMENT — PAIN DESCRIPTION - DESCRIPTORS
DESCRIPTORS: ACHING;THROBBING
DESCRIPTORS: ACHING
DESCRIPTORS: ACHING

## 2023-09-21 ASSESSMENT — PAIN SCALES - GENERAL
PAINLEVEL_OUTOF10: 0
PAINLEVEL_OUTOF10: 4
PAINLEVEL_OUTOF10: 9
PAINLEVEL_OUTOF10: 8
PAINLEVEL_OUTOF10: 0
PAINLEVEL_OUTOF10: 9
PAINLEVEL_OUTOF10: 9

## 2023-09-21 ASSESSMENT — PAIN SCALES - WONG BAKER: WONGBAKER_NUMERICALRESPONSE: 0

## 2023-09-21 NOTE — CARE COORDINATION
Writer placed call to Deborah at the [address] regarding authorization status. No answer, voicemail left. Electronically signed by MICKY Soliz on 9/21/2023 at 11:27 AM    Writer spoke to Deborah regarding pt status and not being medically ready for discharge. Facility will have to restart authorization when pt is closer to discharge ready. Deborah states if pt needs to admit over the weekend it could possibly be a semi-private room depending on discharges.   Electronically signed by MICKY Soliz on 9/21/2023 at 2:59 PM

## 2023-09-21 NOTE — PROGRESS NOTES
Date:   9/21/2023  Patient name: Nyla Underwood  Date of admission:  9/12/2023  3:31 AM  MRN:   [id number]  YOB: 1940  PCP: No primary care provider on file. Reason for Admission: Choledocholithiasis [K80.50]    Cardiology follow-up: Abnormal high-sensitivity troponin, elevated proBNP       Referring physician: Dr Lluvia Chiang  Admission 9/12/23 with abdominal pain, fever and chills abnormal ALT AST, mildly dilated CBD  E. coli bacteremia, likely biliary source  9/15/2023 exploratory laparotomy, repair of duodenal perforation, EGD, drain placement  Gallbladder sludge  Elevated proBNP, borderline abnormal high-sensitivity troponin  Anemia, severe iron deficiency  Hypertension  Hyperlipidemia  Chronic back pain  Severe iron deficiency with anemia  History of breast cancer status post surgery    Investigation work-up     ECG 9/14/2023 -12:30 PM  Sinus rhythm, no ischemic ECG changes     MRI abdomen without contrast  Mild prominence of the common bile duct without intrahepatic biliary dilatation or convincing evidence for choledocholithiasis  Gallbladder ultrasound 9/12/2023  Borderline dilated common bile duct small right pleural effusion     2D echo 9/14/2023  Normal LV size mild LVH normal wall motion ejection fraction more than 55%  Grade 1 LV diastolic dysfunction  No significant valvular abnormality noted  No pericardial effusion  Normal IVC diameter and respiratory variation     History of present illness     82years old female with a past medical history of pretension, hyperlipidemia, breast cancer status postsurgery, gastroesophageal reflux disease sleep presented to [facility] with abdominal pain. Her symptoms started about a week prior to the admission. She felt better after avoiding fatty food. She had a relapse of symptoms again a day before admission that prompted ER visit.   CT abdomen showed 1.4 cm CBD dilation that prompted her transport to [facility] present  Extremities: Homans sign is negative, no sign of DVT  Neurologic: Mental status: Alert, oriented, thought content appropriate    EKG: normal sinus rhythm. ECHO: reviewed. Ejection fraction: 60% mild LVH, no obvious valvular abnormality, normal wall motions  Stress Test: not obtained. Cardiac Angiography: not obtained.     Assessment / Acute Cardiac Problems:     Patient admitted with right upper quadrant pain, abnormal ALT AST, urinary tract infection fever and chills  Abnormal CT abdomen dilated CBD  Emergency laparotomy for perforated duodenum first portion 9/15/2023  Episode of lower chest, upper abdomen pain at rest like a heavy pressure lasted half an hour, no ECG changes noted, high-sensitivity troponin 28  Severe iron deficiency , anemia  Hypokalemia and hypomagnesemia, improved  PAC and PVC, nonsustained SVT started on IV Lopressor    Patient Active Problem List:     Choledocholithiasis     Benign essential HTN     Diabetes mellitus type 2 in nonobese [facility])     Mixed hyperlipidemia     Acute gastroenteritis     Dilation of biliary tract     Abnormal LFTs     Right upper quadrant abdominal pain     Weight loss     Bacteremia, escherichia coli     Cardiac arrhythmia     Calculus of gallbladder with acute cholecystitis without obstruction     Perforated bowel ([address])      Plan of Treatment:   Medications reviewed  Continue IV Lopressor 5 mg every 6 hours  Started on losartan 50 mg a day  Try to keep pain under control    Electronically signed by Maegan Soto MD on 9/21/2023 at 4:40 PM

## 2023-09-21 NOTE — PLAN OF CARE
Problem: Safety - Adult  Goal: Free from fall injury  9/21/2023 0452 by Andres Ingram RN  Outcome: Progressing  Note: No falls noted this shift. Patient ambulates with x1 staff assistance without difficulty. Bed kept in low position. Safe environment maintained. Bedside table & call light in reach. Uses call light appropriately when needing assistance. Problem: Pain  Goal: Verbalizes/displays adequate comfort level or baseline comfort level  9/21/2023 0452 by Andres Ingram RN  Outcome: Progressing  Note: Pt medicated with pain medication prn. Assessed all pain characteristics including level, type, location, frequency, and onset. Non-pharmacologic interventions offered to pt as well. Pt states pain is tolerable at this time. Will continue to monitor. Problem: Chronic Conditions and Co-morbidities  Goal: Patient's chronic conditions and co-morbidity symptoms are monitored and maintained or improved  9/21/2023 0452 by Andres Ingram RN  Outcome: Progressing  Note:   Patient's Chronic Conditions and Co-Morbidity symptoms are monitored and maintained or improved; monitor and assess patient's chronic conditions and comorbid symptoms for stability, deterioration, or improvement; Collaborate with multidisciplinary team to address chronic and comorbid conditions and prevent exacerbation or deterioration. Problem: Skin/Tissue Integrity  Goal: Absence of new skin breakdown  Description: 1. Monitor for areas of redness and/or skin breakdown  2. Assess vascular access sites hourly  3. Every 4-6 hours minimum:  Change oxygen saturation probe site  4. Every 4-6 hours:  If on nasal continuous positive airway pressure, respiratory therapy assess nares and determine need for appliance change or resting period.   9/21/2023 0452 by Andres Ingram RN  Outcome: Progressing     Problem: Nutrition Deficit:  Goal: Optimize nutritional status  9/21/2023 0452 by Andres Ingram RN  Outcome: Progressing

## 2023-09-21 NOTE — PLAN OF CARE
Problem: Discharge Planning  Goal: Discharge to home or other facility with appropriate resources  Outcome: Progressing     Problem: Safety - Adult  Goal: Free from fall injury  9/21/2023 1717 by Zulema Ferraro RN  Outcome: Progressing  9/21/2023 0452 by Tracy Stewart RN  Outcome: Progressing  Note: No falls noted this shift. Patient ambulates with x1 staff assistance without difficulty. Bed kept in low position. Safe environment maintained. Bedside table & call light in reach. Uses call light appropriately when needing assistance. Problem: Pain  Goal: Verbalizes/displays adequate comfort level or baseline comfort level  9/21/2023 1717 by Zulema Ferraro RN  Outcome: Progressing  9/21/2023 0452 by Tracy Stewart RN  Outcome: Progressing  Note: Pt medicated with pain medication prn. Assessed all pain characteristics including level, type, location, frequency, and onset. Non-pharmacologic interventions offered to pt as well. Pt states pain is tolerable at this time. Will continue to monitor. Problem: Chronic Conditions and Co-morbidities  Goal: Patient's chronic conditions and co-morbidity symptoms are monitored and maintained or improved  9/21/2023 1717 by Zulema Ferraro RN  Outcome: Progressing  9/21/2023 0452 by Tracy Stewart RN  Outcome: Progressing  Note:   Patient's Chronic Conditions and Co-Morbidity symptoms are monitored and maintained or improved; monitor and assess patient's chronic conditions and comorbid symptoms for stability, deterioration, or improvement; Collaborate with multidisciplinary team to address chronic and comorbid conditions and prevent exacerbation or deterioration. Problem: Skin/Tissue Integrity  Goal: Absence of new skin breakdown  Description: 1. Monitor for areas of redness and/or skin breakdown  2. Assess vascular access sites hourly  3. Every 4-6 hours minimum:  Change oxygen saturation probe site  4.   Every 4-6 hours:  If on nasal continuous positive airway

## 2023-09-21 NOTE — PROGRESS NOTES
Comprehensive Nutrition Assessment    Type and Reason for Visit:  Reassess    Nutrition Recommendations/Plan:   Diet changed to Full liquid. Changed Ensure Clear to Ensure High Protein. Follow for tolerance to diet. Following changes made to additives: Na acetate- 65 to 25 mEq, NaCl- 15 to 65 mEq, K acetate- 50 to 40 mEq, K Phos- 30 to 35 mmol, KCl- 25 to 35 mEq, Ca+- 4 to 2 mEq, Mg- 10 to 12 mEq, insulin- 20 to 32 units, no MVI or trace elements. Blood sugar was up to 263 called RN to see if corrective algorithm could be changed from low to at least medium which was done. Malnutrition Assessment:  Malnutrition Status: At risk for malnutrition (Comment) (09/17/23 6384)    Context:  Acute Illness     Findings of the 6 clinical characteristics of malnutrition:  Energy Intake:  50% or less of estimated energy requirements for 5 or more days  Weight Loss:  Unable to assess     Body Fat Loss:  Unable to assess     Muscle Mass Loss:  Unable to assess    Fluid Accumulation:  Mild Extremities   Strength:  Not Performed    Nutrition Assessment:    Pt's daughter states her nausea has improved with the scopolamine patch. Daughter states pt is ready for diet advancement and glad she can start on Full liquid. Daughter states she didn't like the broth it was too salty, pt isn't isn't much for salty or sweet foods. Discussed trying ensure High Protein which she was open to in vanilla flavor. Talked with surgery NP and if tolerates full liquids try regular diet tomorrow then hopefully can wean TPN off. TPN will continue at 70 ml/hr, additives adjusted based on lab values. Nutrition Related Findings:    mild edema BUE, Labs/Meds: Reviewed, PMH: cancer, DM, GERD Wound Type: Surgical Incision       Current Nutrition Intake & Therapies:    Average Meal Intake: 26-50%  Average Supplements Intake: 26-50%  PN-ADULT PREMIX 5/20 - CENTRAL  ADULT DIET; Full Liquid  ADULT ORAL NUTRITION SUPPLEMENT; Breakfast, Dinner;  Low

## 2023-09-21 NOTE — PROGRESS NOTES
[facility]   OCCUPATIONAL THERAPY MISSED TREATMENT NOTE   INPATIENT   Date: 23  Patient Name: Kieran Masters       Room:   MRN: [id number]   Account #: [de-identified]    : 1940  (82 y.o.)  Gender: female   Referring Practitioner: Margene Homans, MD  Diagnosis: Choledocholithiasis             REASON FOR MISSED TREATMENT:  Patient declined   -    Pt lying in bed with daughter at bedside. Pt reporting fatigue, requesting OT check back later this date as able. OT attempt at 1100. Second attempt this date: writer returned at 1, pt sleeping soundly. Pt daughter seated at bedside, requesting therapy to check back next date.              Electronically signed by JAMES Rahman on 23 at 11:05 AM EDT

## 2023-09-21 NOTE — PROGRESS NOTES
Physical Therapy        Physical Therapy Cancel Note      DATE: 2023    NAME: Nyla Underwood  MRN: [id number]   : 1940      Patient not seen this date for Physical Therapy due to:    Patient Declined: Cx; patient declined therapy services this date citing fatigue, PCT present and preparing to sponge bathe patient at this time. Writer plans to attempt treatment at a later time.       Electronically signed by Cristal Khanna PTA on 2023 at 3:21 PM

## 2023-09-21 NOTE — PROGRESS NOTES
RN informed Margi NP, Patients /64, HR 75. Pt given scheduled Lopressor 5 mg IV. Will continue with care.

## 2023-09-21 NOTE — PROGRESS NOTES
acute abdominal pain likely due to choledocholithiasis, 1.4 cm CBD dilatation, elevated LFT, ultrasound no cholecystitis, MRCP unremarkable    Also treated for gastroenteritis-Rocephin and Flagyl started  Type 2 diabetes  HTN, HLD  Iron deficiency anemia with very low iron  Noted to have E. coli bacteremia ID was consulted likely biliary source    On 9/14 she developed chest pain, SVT, cardiology was consulted  EUS on 9/15-concern for duodenal perforation General surgery consulted, perforation confirmed on CT abdomen and emergent laparotomy was done on 9/15    9/16  Patient is on 3 L nasal cannula oxygen  She is awake alert oriented cooperative with exam  Blood pressure on the high side-as needed hydralazine added  Remains n.p.o., LFTs have downtrended since admission  Urine output has been good  Blood sugar controlled  Hemoglobin overall stable  Patient currently on cefepime and Flagyl via PICC line all oral medications have been held  Plan is for n.p.o. until Monday probably repeat EGD after  9/18. Awaiting upper GI studies  NG will be removed after that  On IV Rocephin  Family has a concern that patient will not able to go home, will have  talk to the family Will need arrangement to SNF placement  Liver functions are improving  Slightly elevated white count patient is already on antibiotic  Hemoglobin stable  Changing fluid to half-normal saline. Lovenox for DVT prophylaxis once okay with operating surgeon.   Awaiting results of UGI studies and X ray KUB   Has good bowel sounds    9/19   Patient, clinically doing much better today  Tolerating clear liquid diet  NG is out  On DVT prophylaxis Lovenox  Passing stools  Pain is controlled    9/20   On TPN  On IV antibiotic  We will discontinue fluids containing dextrose with rising blood sugar patient is diabetic and patient is on TPN as well  On Lovenox for DVT prophylaxis  Working with PT    9/21  Readings of high blood pressure, resuming Cozaar  Patient diet is increased  I did peer to peer yesterday patient is approved for going to SNF  Plan to increase diet to regular diet tomorrow per general surgery  Electrolytes are okay  Reading of high blood sugar.   Chart to dietitian about adjusting TPN  CBC tomorrow  Levi Wharton MD  9/21/2023  12:47 PM

## 2023-09-22 LAB
ALBUMIN SERPL-MCNC: 2.5 G/DL (ref 3.5–5.2)
ALP SERPL-CCNC: 40 U/L (ref 35–104)
ALT SERPL-CCNC: 13 U/L (ref 5–33)
ANION GAP SERPL CALCULATED.3IONS-SCNC: 5 MMOL/L (ref 9–17)
AST SERPL-CCNC: 11 U/L
BASOPHILS # BLD: 0 K/UL (ref 0–0.2)
BASOPHILS NFR BLD: 0 % (ref 0–2)
BILIRUB DIRECT SERPL-MCNC: <0.1 MG/DL
BILIRUB INDIRECT SERPL-MCNC: ABNORMAL MG/DL (ref 0–1)
BILIRUB SERPL-MCNC: <0.2 MG/DL (ref 0.3–1.2)
BUN SERPL-MCNC: 24 MG/DL (ref 8–23)
CALCIUM SERPL-MCNC: 9 MG/DL (ref 8.6–10.4)
CHLORIDE SERPL-SCNC: 101 MMOL/L (ref 98–107)
CO2 SERPL-SCNC: 26 MMOL/L (ref 20–31)
CREAT SERPL-MCNC: 0.6 MG/DL (ref 0.5–0.9)
EOSINOPHIL # BLD: 0.5 K/UL (ref 0–0.4)
EOSINOPHILS RELATIVE PERCENT: 4 % (ref 0–4)
ERYTHROCYTE [DISTWIDTH] IN BLOOD BY AUTOMATED COUNT: 13.6 % (ref 11.5–14.9)
GFR SERPL CREATININE-BSD FRML MDRD: >60 ML/MIN/1.73M2
GLUCOSE BLD-MCNC: 199 MG/DL (ref 65–105)
GLUCOSE BLD-MCNC: 261 MG/DL (ref 65–105)
GLUCOSE SERPL-MCNC: 167 MG/DL (ref 70–99)
HCT VFR BLD AUTO: 26.4 % (ref 36–46)
HGB BLD-MCNC: 8.3 G/DL (ref 12–16)
LYMPHOCYTES NFR BLD: 1.8 K/UL (ref 1–4.8)
LYMPHOCYTES RELATIVE PERCENT: 13 % (ref 24–44)
MAGNESIUM SERPL-MCNC: 2.1 MG/DL (ref 1.6–2.6)
MCH RBC QN AUTO: 28.7 PG (ref 26–34)
MCHC RBC AUTO-ENTMCNC: 31.6 G/DL (ref 31–37)
MCV RBC AUTO: 91 FL (ref 80–100)
MONOCYTES NFR BLD: 1.4 K/UL (ref 0.1–1.3)
MONOCYTES NFR BLD: 11 % (ref 1–7)
NEUTROPHILS NFR BLD: 72 % (ref 36–66)
NEUTS SEG NFR BLD: 9.5 K/UL (ref 1.3–9.1)
PHOSPHATE SERPL-MCNC: 2.7 MG/DL (ref 2.6–4.5)
PLATELET # BLD AUTO: 382 K/UL (ref 150–450)
PMV BLD AUTO: 8.4 FL (ref 6–12)
POTASSIUM SERPL-SCNC: 4.9 MMOL/L (ref 3.7–5.3)
PROT SERPL-MCNC: 4.9 G/DL (ref 6.4–8.3)
RBC # BLD AUTO: 2.9 M/UL (ref 4–5.2)
SODIUM SERPL-SCNC: 132 MMOL/L (ref 135–144)
WBC OTHER # BLD: 13.3 K/UL (ref 3.5–11)

## 2023-09-22 PROCEDURE — 2580000003 HC RX 258: Performed by: INTERNAL MEDICINE

## 2023-09-22 PROCEDURE — 6360000002 HC RX W HCPCS: Performed by: INTERNAL MEDICINE

## 2023-09-22 PROCEDURE — 2060000000 HC ICU INTERMEDIATE R&B

## 2023-09-22 PROCEDURE — 82947 ASSAY GLUCOSE BLOOD QUANT: CPT

## 2023-09-22 PROCEDURE — 84100 ASSAY OF PHOSPHORUS: CPT

## 2023-09-22 PROCEDURE — 6370000000 HC RX 637 (ALT 250 FOR IP): Performed by: INTERNAL MEDICINE

## 2023-09-22 PROCEDURE — 80076 HEPATIC FUNCTION PANEL: CPT

## 2023-09-22 PROCEDURE — 6370000000 HC RX 637 (ALT 250 FOR IP): Performed by: SURGERY

## 2023-09-22 PROCEDURE — 85025 COMPLETE CBC W/AUTO DIFF WBC: CPT

## 2023-09-22 PROCEDURE — C9113 INJ PANTOPRAZOLE SODIUM, VIA: HCPCS | Performed by: INTERNAL MEDICINE

## 2023-09-22 PROCEDURE — 6360000002 HC RX W HCPCS: Performed by: NURSE PRACTITIONER

## 2023-09-22 PROCEDURE — 97110 THERAPEUTIC EXERCISES: CPT

## 2023-09-22 PROCEDURE — A4216 STERILE WATER/SALINE, 10 ML: HCPCS | Performed by: INTERNAL MEDICINE

## 2023-09-22 PROCEDURE — 80048 BASIC METABOLIC PNL TOTAL CA: CPT

## 2023-09-22 PROCEDURE — 99024 POSTOP FOLLOW-UP VISIT: CPT | Performed by: SURGERY

## 2023-09-22 PROCEDURE — 99233 SBSQ HOSP IP/OBS HIGH 50: CPT | Performed by: INTERNAL MEDICINE

## 2023-09-22 PROCEDURE — 2500000003 HC RX 250 WO HCPCS: Performed by: INTERNAL MEDICINE

## 2023-09-22 PROCEDURE — 36415 COLL VENOUS BLD VENIPUNCTURE: CPT

## 2023-09-22 PROCEDURE — 2580000003 HC RX 258: Performed by: SURGERY

## 2023-09-22 PROCEDURE — 83735 ASSAY OF MAGNESIUM: CPT

## 2023-09-22 PROCEDURE — 97530 THERAPEUTIC ACTIVITIES: CPT

## 2023-09-22 PROCEDURE — 99232 SBSQ HOSP IP/OBS MODERATE 35: CPT | Performed by: INTERNAL MEDICINE

## 2023-09-22 PROCEDURE — 6360000002 HC RX W HCPCS: Performed by: SURGERY

## 2023-09-22 PROCEDURE — 2500000003 HC RX 250 WO HCPCS: Performed by: SURGERY

## 2023-09-22 RX ORDER — PANTOPRAZOLE SODIUM 40 MG/1
40 TABLET, DELAYED RELEASE ORAL
Status: DISCONTINUED | OUTPATIENT
Start: 2023-09-23 | End: 2023-09-29 | Stop reason: HOSPADM

## 2023-09-22 RX ORDER — METRONIDAZOLE 500 MG/1
500 TABLET ORAL EVERY 8 HOURS SCHEDULED
Status: DISCONTINUED | OUTPATIENT
Start: 2023-09-22 | End: 2023-09-28

## 2023-09-22 RX ADMIN — FENTANYL CITRATE 50 MCG: 0.05 INJECTION, SOLUTION INTRAMUSCULAR; INTRAVENOUS at 09:28

## 2023-09-22 RX ADMIN — INSULIN LISPRO 4 UNITS: 100 INJECTION, SOLUTION INTRAVENOUS; SUBCUTANEOUS at 17:57

## 2023-09-22 RX ADMIN — METOPROLOL TARTRATE 5 MG: 5 INJECTION, SOLUTION INTRAVENOUS at 03:31

## 2023-09-22 RX ADMIN — FENTANYL CITRATE 50 MCG: 0.05 INJECTION, SOLUTION INTRAMUSCULAR; INTRAVENOUS at 21:48

## 2023-09-22 RX ADMIN — METRONIDAZOLE 500 MG: 500 TABLET ORAL at 21:48

## 2023-09-22 RX ADMIN — METOPROLOL TARTRATE 5 MG: 5 INJECTION, SOLUTION INTRAVENOUS at 09:36

## 2023-09-22 RX ADMIN — LOSARTAN POTASSIUM 50 MG: 50 TABLET, FILM COATED ORAL at 09:35

## 2023-09-22 RX ADMIN — FENTANYL CITRATE 50 MCG: 0.05 INJECTION, SOLUTION INTRAMUSCULAR; INTRAVENOUS at 13:41

## 2023-09-22 RX ADMIN — CEFTRIAXONE SODIUM 2000 MG: 2 INJECTION, POWDER, FOR SOLUTION INTRAMUSCULAR; INTRAVENOUS at 19:31

## 2023-09-22 RX ADMIN — SODIUM CHLORIDE, PRESERVATIVE FREE 40 MG: 5 INJECTION INTRAVENOUS at 09:32

## 2023-09-22 RX ADMIN — METOPROLOL TARTRATE 5 MG: 5 INJECTION, SOLUTION INTRAVENOUS at 15:42

## 2023-09-22 RX ADMIN — POTASSIUM PHOSPHATE, MONOBASIC POTASSIUM PHOSPHATE, DIBASIC: 224; 236 INJECTION, SOLUTION, CONCENTRATE INTRAVENOUS at 17:56

## 2023-09-22 RX ADMIN — FENTANYL CITRATE 50 MCG: 0.05 INJECTION, SOLUTION INTRAMUSCULAR; INTRAVENOUS at 11:38

## 2023-09-22 RX ADMIN — POLYETHYLENE GLYCOL 3350 17 G: 17 POWDER, FOR SOLUTION ORAL at 11:43

## 2023-09-22 RX ADMIN — FENTANYL CITRATE 50 MCG: 0.05 INJECTION, SOLUTION INTRAMUSCULAR; INTRAVENOUS at 19:24

## 2023-09-22 RX ADMIN — METOPROLOL TARTRATE 5 MG: 5 INJECTION, SOLUTION INTRAVENOUS at 19:24

## 2023-09-22 RX ADMIN — FENTANYL CITRATE 50 MCG: 0.05 INJECTION, SOLUTION INTRAMUSCULAR; INTRAVENOUS at 01:02

## 2023-09-22 RX ADMIN — SODIUM CHLORIDE, PRESERVATIVE FREE 10 ML: 5 INJECTION INTRAVENOUS at 09:39

## 2023-09-22 RX ADMIN — METRONIDAZOLE 500 MG: 500 INJECTION, SOLUTION INTRAVENOUS at 03:28

## 2023-09-22 RX ADMIN — SODIUM CHLORIDE, PRESERVATIVE FREE 10 ML: 5 INJECTION INTRAVENOUS at 19:27

## 2023-09-22 RX ADMIN — GABAPENTIN 300 MG: 300 CAPSULE ORAL at 19:25

## 2023-09-22 RX ADMIN — METOCLOPRAMIDE HYDROCHLORIDE 5 MG: 5 INJECTION INTRAMUSCULAR; INTRAVENOUS at 05:36

## 2023-09-22 RX ADMIN — METOCLOPRAMIDE HYDROCHLORIDE 5 MG: 5 INJECTION INTRAMUSCULAR; INTRAVENOUS at 00:20

## 2023-09-22 RX ADMIN — GABAPENTIN 300 MG: 300 CAPSULE ORAL at 09:35

## 2023-09-22 RX ADMIN — METRONIDAZOLE 500 MG: 500 INJECTION, SOLUTION INTRAVENOUS at 11:43

## 2023-09-22 RX ADMIN — FENTANYL CITRATE 50 MCG: 0.05 INJECTION, SOLUTION INTRAMUSCULAR; INTRAVENOUS at 04:16

## 2023-09-22 RX ADMIN — PRAVASTATIN SODIUM 40 MG: 40 TABLET ORAL at 19:25

## 2023-09-22 RX ADMIN — ENOXAPARIN SODIUM 40 MG: 100 INJECTION SUBCUTANEOUS at 09:39

## 2023-09-22 ASSESSMENT — PAIN DESCRIPTION - DESCRIPTORS
DESCRIPTORS: ACHING;NAGGING;THROBBING
DESCRIPTORS: THROBBING
DESCRIPTORS: THROBBING
DESCRIPTORS: SORE;THROBBING

## 2023-09-22 ASSESSMENT — PAIN SCALES - WONG BAKER: WONGBAKER_NUMERICALRESPONSE: 0

## 2023-09-22 ASSESSMENT — PAIN DESCRIPTION - ONSET: ONSET: ON-GOING

## 2023-09-22 ASSESSMENT — PAIN DESCRIPTION - LOCATION
LOCATION: ABDOMEN
LOCATION: ABDOMEN;NECK
LOCATION: ABDOMEN
LOCATION: ABDOMEN;NECK
LOCATION: ABDOMEN

## 2023-09-22 ASSESSMENT — PAIN SCALES - GENERAL
PAINLEVEL_OUTOF10: 7
PAINLEVEL_OUTOF10: 8
PAINLEVEL_OUTOF10: 9
PAINLEVEL_OUTOF10: 8
PAINLEVEL_OUTOF10: 4
PAINLEVEL_OUTOF10: 2
PAINLEVEL_OUTOF10: 8
PAINLEVEL_OUTOF10: 7
PAINLEVEL_OUTOF10: 8
PAINLEVEL_OUTOF10: 8

## 2023-09-22 ASSESSMENT — PAIN DESCRIPTION - FREQUENCY: FREQUENCY: CONTINUOUS

## 2023-09-22 ASSESSMENT — PAIN DESCRIPTION - PAIN TYPE: TYPE: ACUTE PAIN

## 2023-09-22 NOTE — PROGRESS NOTES
Physical Therapy  Facility/Department: [facility]  Daily Treatment Note  NAME: Johnny Breaux  : 1940  MRN: [id number]    Date of Service: 2023    Discharge Recommendations:  Patient would benefit from continued therapy after discharge, Therapy recommended at discharge   PT Equipment Recommendations  Equipment Needed:  (TBD)    Patient Diagnosis(es): The primary encounter diagnosis was Cardiac arrhythmia, unspecified cardiac arrhythmia type. Diagnoses of Calculus of gallbladder with acute cholecystitis without obstruction and Perforated bowel ([address]) were also pertinent to this visit. Assessment   Activity Tolerance: Patient tolerated treatment well  Equipment Needed:  (TBD)     Plan    Physcial Therapy Plan  General Plan: 5-7 times per week  Specific Instructions for Next Treatment: try step  Current Treatment Recommendations: Strengthening;Gait training;Balance training;Functional mobility training;Stair training; Safety education & training; Therapeutic activities; Patient/Caregiver education & training;Transfer training; Endurance training;Equipment evaluation, education, & procurement  PT Plan of Care:  (5-7 treatments/ week)     Restrictions  Restrictions/Precautions  Restrictions/Precautions: Fall Risk  Required Braces or Orthoses?: No  Implants present? : Metal implants (right TKR)  Position Activity Restriction  Other position/activity restrictions: NO BP, IV sticks or venipunctures right UE. Subjective    Subjective  Subjective: Patient resting in bed upon approach, agreeable to PT services. LORRAINE Raphael approved treatment this date. Pain: Reports tolerable pain, increases with mobility and coughing.   Orientation  Overall Orientation Status: Within Functional Limits  Orientation Level: Oriented to person;Oriented to place;Oriented to time;Disoriented to situation  Cognition  Overall Cognitive Status: Exceptions  Arousal/Alertness: Delayed responses to stimuli  Following Commands:

## 2023-09-22 NOTE — PROGRESS NOTES
Pt and daughter welcomed Julita Varner and were receptive to visit. Pt said \"there is only one God. \"  We prayed to the God of all.         09/22/23 1524   Encounter Summary   Encounter Overview/Reason  Spiritual/Emotional Needs   Service Provided For: Patient and family together   Referral/Consult From: [address] [place] [address]   Last Encounter  09/22/23   Complexity of Encounter Low   Spiritual/Emotional needs   Type Spiritual Support   Assessment/Intervention/Outcome   Assessment Calm;Coping;Peaceful   Intervention Active listening;Discussed relationship with God;Prayer (assurance of)/[place];Sustaining Presence/Ministry of presence   Outcome Comfort;Coping;Engaged in conversation;Expressed Gratitude;Receptive

## 2023-09-22 NOTE — CARE COORDINATION
Writer spoke to pt daughter Deanne Krueger regarding discharge plans and insurance authorization. No further questions at this time.   Electronically signed by MICKY Henry on 9/22/2023 at 4:29 PM

## 2023-09-22 NOTE — PLAN OF CARE
Problem: Discharge Planning  Goal: Discharge to home or other facility with appropriate resources  9/22/2023 1612 by Saul Jackson RN  Outcome: Progressing  9/22/2023 0345 by Weston Malik RN  Outcome: Progressing     Problem: Safety - Adult  Goal: Free from fall injury  9/22/2023 1612 by Saul Jackson RN  Outcome: Progressing  9/22/2023 0345 by Weston Malik RN  Outcome: Progressing     Problem: Pain  Goal: Verbalizes/displays adequate comfort level or baseline comfort level  9/22/2023 1612 by Saul Jackson RN  Outcome: Progressing  9/22/2023 0345 by Weston Malik RN  Outcome: Progressing     Problem: Chronic Conditions and Co-morbidities  Goal: Patient's chronic conditions and co-morbidity symptoms are monitored and maintained or improved  9/22/2023 1612 by Saul Jackson RN  Outcome: Progressing  9/22/2023 0345 by Weston Malik RN  Outcome: Progressing     Problem: Skin/Tissue Integrity  Goal: Absence of new skin breakdown  Description: 1. Monitor for areas of redness and/or skin breakdown  2. Assess vascular access sites hourly  3. Every 4-6 hours minimum:  Change oxygen saturation probe site  4. Every 4-6 hours:  If on nasal continuous positive airway pressure, respiratory therapy assess nares and determine need for appliance change or resting period.   9/22/2023 1612 by Saul Jackson RN  Outcome: Progressing  9/22/2023 0345 by Weston Malik RN  Outcome: Progressing     Problem: ABCDS Injury Assessment  Goal: Absence of physical injury  9/22/2023 1612 by Saul Jackson RN  Outcome: Progressing  9/22/2023 0345 by Weston Malik RN  Outcome: Progressing     Problem: Nutrition Deficit:  Goal: Optimize nutritional status  9/22/2023 1612 by Saul Jackson RN  Outcome: Progressing  9/22/2023 0345 by Weston Malik RN  Outcome: Progressing

## 2023-09-22 NOTE — PROGRESS NOTES
[facility]  [facility]  Pao Anderson MD; Surendra Quigley MD; Dre Hopkins MD; MD Lawrence Ferreira MD; Juan Saul MD; Issac Dumont MD        Progress Note    9/22/2023    11:33 AM    Name:   Boyd Mello  MRN:     [id number]     Acct:      [de-identified]   Room:   2114/2114-01  IP Day:  10  Admit Date:  9/12/2023  3:31 AM    PCP:   No primary care provider on file. Code Status:  Full Code    Subjective:     C/C: No chief complaint on file. Abdominal pain      Interval History Status: Improving    HPI:     Initially admitted for management of abdominal pain, noted to have choledocholithiasis  9/18  Patient, clinically doing almost the same  Complaining of abdominal pain  NG in situ  Had episode of vomiting , during UGI STUDIES   Did not pass stools, flatus  9/20   Patient, clinically doing better  Working with therapy  On IV antibiotic  Passing stools  On TPN    9/21  Patient clinically doing better  Abdominal pain is improving  Nauseous but no vomiting  Tolerating clear liquid diet which is getting advanced  Reglan  On TPN    9/22  Patient seen and examined, was sitting on the chair today, states that she is feeling good, still have some abdominal pain,  Diet advanced today  Did not have any bowel movement today and yesterday, passing gas    Review of Systems:     Denies any shortness of breath or cough  Denies chest pain or palpitations  Appropriate postsurgical abdominal pain, no vomiting  Denies any new numbness tremors or weakness. Medications: Allergies:     Allergies   Allergen Reactions    Ace Inhibitors Nausea Only    Mixed Ragweed     Sulfa Antibiotics Nausea Only    Contrast [Iodides] Nausea And Vomiting    Pcn [Penicillins] Rash       Current Meds:   Scheduled Meds:    [START ON 9/23/2023] pantoprazole  40 mg Oral QAM AC    insulin lispro  0-8 Units SubCUTAneous TID WC    insulin lispro  0-4 Units discontinue fluids containing dextrose with rising blood sugar patient is diabetic and patient is on TPN as well  On Lovenox for DVT prophylaxis  Working with PT    9/21  Readings of high blood pressure, resuming Cozaar  Patient diet is increased  I did peer to peer yesterday patient is approved for going to SNF  Plan to increase diet to regular diet tomorrow per general surgery  Electrolytes are okay  Reading of high blood sugar.   Chart to dietitian about adjusting TPN  CBC tomorrow    9/22  Blood pressure is getting better, patient on Cozaar  Continues to be on Rocephin and Flagyl leukocytosis slightly worse today but patient has been afebrile  Diet has been advanced, continues to be on TPN, will slowly wean off once she starts tolerating regular diet  Hb stable   Blood sugars better  DVT prophylaxis    Reuben Dunn MD  9/22/2023  11:33 AM

## 2023-09-22 NOTE — PLAN OF CARE
Problem: Discharge Planning  Goal: Discharge to home or other facility with appropriate resources  9/22/2023 0345 by Florentino Cross RN  Outcome: Progressing     Problem: Safety - Adult  Goal: Free from fall injury  9/22/2023 0345 by Florentino Cross RN  Outcome: Progressing     Problem: Pain  Goal: Verbalizes/displays adequate comfort level or baseline comfort level  9/22/2023 0345 by Florentino Cross RN  Outcome: Progressing     Problem: Chronic Conditions and Co-morbidities  Goal: Patient's chronic conditions and co-morbidity symptoms are monitored and maintained or improved  9/22/2023 0345 by Florentino Cross RN  Outcome: Progressing     Problem: Skin/Tissue Integrity  Goal: Absence of new skin breakdown  Description: 1. Monitor for areas of redness and/or skin breakdown  2. Assess vascular access sites hourly  3. Every 4-6 hours minimum:  Change oxygen saturation probe site  4. Every 4-6 hours:  If on nasal continuous positive airway pressure, respiratory therapy assess nares and determine need for appliance change or resting period.   9/22/2023 0345 by Florentino Cross RN  Outcome: Progressing     Problem: ABCDS Injury Assessment  Goal: Absence of physical injury  9/22/2023 0345 by Florentino Cross RN  Outcome: Progressing     Problem: Nutrition Deficit:  Goal: Optimize nutritional status  Outcome: Progressing

## 2023-09-22 NOTE — CARE COORDINATION
Writer is following for potential discharge to [facility]. Peer to peer was overturned, authorization good for 24 hour period on 9/21. Authorization will have to be restarted by facility for this pt when appropriate.   Electronically signed by MICKY Ryan on 9/22/2023 at 10:02 AM

## 2023-09-22 NOTE — PROGRESS NOTES
Comprehensive Nutrition Assessment    Type and Reason for Visit:  Reassess    Nutrition Recommendations/Plan:   Continue 4 carbohdyrate/meal diet and supplements as ordered. Malnutrition Assessment:  Malnutrition Status: At risk for malnutrition (Comment) (09/17/23 0363)    Context:  Acute Illness     Findings of the 6 clinical characteristics of malnutrition:  Energy Intake:  50% or less of estimated energy requirements for 5 or more days  Weight Loss:  Unable to assess     Body Fat Loss:  Unable to assess     Muscle Mass Loss:  Unable to assess    Fluid Accumulation:  Mild Extremities   Strength:  Not Performed    Nutrition Assessment:    Surgery advanced diet to 4 carbohydrate/meal diet starting at lunch today, plan to continue TPN one more day and see how regular diet is tolerated. Writer visited pt/daughter after lunch she ate 1/3 of kary steak, 75% of scalloped potatoes & cauliflower, 100% sherbet, grapes, an Ensure High Protein. Nutrition Related Findings:    mild edema BUE, Labs/Meds: Reviewed, PMH: cancer, DM, GERD Wound Type: Surgical Incision       Current Nutrition Intake & Therapies:    Average Meal Intake: %  Average Supplements Intake: %  ADULT ORAL NUTRITION SUPPLEMENT; Breakfast, Dinner; Low Calorie/High Protein Oral Supplement  PN-ADULT PREMIX 5/20 - CENTRAL  ADULT DIET; Regular; 4 carb choices (60 gm/meal)  PN-ADULT PREMIX 5/20 - CENTRAL  Current Parenteral Nutrition Orders:  Type and Formula: 2-in-1 Custom   Lipids: None  Duration: Continuous  Rate/Volume: 70 ml/ 1680 ml  Current PN Order Provides: 1478 kcals, 84 gm protein      Anthropometric Measures:  Height: 5' (152.4 cm)  Ideal Body Weight (IBW): 100 lbs (45 kg)    Admission Body Weight: 130 lb (59 kg)  Current Body Weight: 130 lb (59 kg),   IBW. Weight Source: Bed Scale  Current BMI (kg/m2): 25.4                          BMI Categories: Overweight (BMI 25.0-29. 9)    Estimated Daily Nutrient Needs:  Energy

## 2023-09-22 NOTE — CARE COORDINATION
Per RN CM lead Kate Rojas placed call to [address] to restart authorization. No answer, voicemail left. Electronically signed by MICKY Melissa on 9/22/2023 at 10:24 AM    Writer spoke to [place] at [address]. Pt authorization is good through 9/24.   Electronically signed by MICKY Melissa on 9/22/2023 at 2:56 PM

## 2023-09-22 NOTE — PROGRESS NOTES
[facility] -   Infectious diseases evaluation  admission date 9/12/2023    reason for consultation:   Bacteremia    Impression :   Current:  E. coli bacteremia, likely biliary source  Abdominal pain/elevated liver enzymes and biliary dilatation. Status post*upper GI endoscopy with EUS 9/15/2023  Perforated bowel status post repair of duodenal perforation 9/15/2023  Diabetes mellitus  Hypertension  Hyperlipidemia  History of breast cancer    Recommendations   IV ceftriaxone 2g daily  through 9/26/2023  Change IV Flagyl to oral hrough 9/26/2023  Midline in place  Supportive care  Follow CBC and renal function  Discussed with patient and daughter at the bedside    Infection Control Recommendations   [place] Precautions      Antimicrobial Stewardship Recommendations   Simplification of therapy  Targeted therapy      History of Present Illness:   Initial history:  Rajeev Cook is a 82y.o.-year-old female who was transferred from [facility] 9/12/2023. She presented with right upper quadrant abdominal pain for several days associated with the nausea and vomiting. Symptoms moderate to severe, no alleviating or aggravating factors. CT abdomen pelvis completed at [facility] showed biliary duct dilatation with the common bile duct measuring 1.4 cm. Liver enzymes were elevated with ALT of 130, , WBC 12.2. Blood cultures done at [facility] grew E. coli that was sensitive to cefazolin and ceftriaxone. Gallbladder ultrasound showed gallbladder sludge with dilated common bile duct  GI consulted, MRCP was completed but limited due to motion artifact. She had EUS 6/64/9343, complicated by perforated bowel status post duodenal perforation repair 9/15/2023. Interval changes  9/22/2023   She was evaluated earlier today, feeling better, still complaining of abdominal pain, tolerating oral, no new complaints.     Patient Vitals for the ESOPHAGOGASTRODUODENOSCOPY performed by Harpreet Johnson MD at [address] N/A 9/15/2023    EGD BIOPSY EUS ULTRASOUND LINEAR performed by Chester Krabbe, MD at [address] [employer]       Medications:      [START ON 9/23/2023] pantoprazole  40 mg Oral QAM AC    insulin lispro  0-8 Units SubCUTAneous TID WC    insulin lispro  0-4 Units SubCUTAneous Nightly    lidocaine  1 patch TransDERmal Daily    scopolamine  1 patch TransDERmal Q72H    metoprolol  5 mg IntraVENous Q6H    cefTRIAXone (ROCEPHIN) IV  2,000 mg IntraVENous Q24H    [Held by provider] sucralfate  1 g Oral 4 times per day    [Held by provider] aspirin  81 mg Oral Daily    gabapentin  300 mg Oral BID    losartan  50 mg Oral Daily    pravastatin  40 mg Oral Daily    sodium chloride flush  5-40 mL IntraVENous 2 times per day    enoxaparin  40 mg SubCUTAneous Daily    metroNIDAZOLE  500 mg IntraVENous Q8H       Social History:     Social History     Socioeconomic History    Marital status: Unknown     Spouse name: Not on file    Number of children: Not on file    Years of education: Not on file    Highest education level: Not on file   Occupational History    Not on file   Tobacco Use    Smoking status: Never    Smokeless tobacco: Never   Substance and Sexual Activity    Alcohol use: Not on file    Drug use: Not on file    Sexual activity: Not on file   Other Topics Concern    Not on file   Social History Narrative    Not on file     Social Determinants of Health     Financial Resource Strain: Not on file   Food Insecurity: Not on file   Transportation Needs: Not on file   Physical Activity: Not on file   Stress: Not on file   Social Connections: Not on file   Intimate Partner Violence: Not on file   Housing Stability: Not on file       Family History:   History reviewed. No pertinent family history.    Medical Decision Making:   I have independently reviewed/ordered the following labs:    CBC with Differential:   Recent Labs

## 2023-09-23 PROBLEM — Z88.1 ALLERGY TO MULTIPLE ANTIBIOTICS: Status: ACTIVE | Noted: 2023-09-23

## 2023-09-23 PROBLEM — D72.829 LEUKOCYTOSIS: Status: ACTIVE | Noted: 2023-09-23

## 2023-09-23 LAB
ANION GAP SERPL CALCULATED.3IONS-SCNC: 6 MMOL/L (ref 9–17)
ANION GAP SERPL CALCULATED.3IONS-SCNC: 6 MMOL/L (ref 9–17)
BASOPHILS # BLD: 0 K/UL (ref 0–0.2)
BASOPHILS NFR BLD: 0 % (ref 0–2)
BUN SERPL-MCNC: 28 MG/DL (ref 8–23)
BUN SERPL-MCNC: 31 MG/DL (ref 8–23)
CALCIUM SERPL-MCNC: 9.3 MG/DL (ref 8.6–10.4)
CALCIUM SERPL-MCNC: 9.4 MG/DL (ref 8.6–10.4)
CHLORIDE SERPL-SCNC: 101 MMOL/L (ref 98–107)
CHLORIDE SERPL-SCNC: 102 MMOL/L (ref 98–107)
CO2 SERPL-SCNC: 24 MMOL/L (ref 20–31)
CO2 SERPL-SCNC: 25 MMOL/L (ref 20–31)
CREAT SERPL-MCNC: 0.6 MG/DL (ref 0.5–0.9)
CREAT SERPL-MCNC: 0.6 MG/DL (ref 0.5–0.9)
EOSINOPHIL # BLD: 0.54 K/UL (ref 0–0.4)
EOSINOPHILS RELATIVE PERCENT: 4 % (ref 0–4)
ERYTHROCYTE [DISTWIDTH] IN BLOOD BY AUTOMATED COUNT: 13.5 % (ref 11.5–14.9)
GFR SERPL CREATININE-BSD FRML MDRD: >60 ML/MIN/1.73M2
GFR SERPL CREATININE-BSD FRML MDRD: >60 ML/MIN/1.73M2
GLUCOSE BLD-MCNC: 111 MG/DL (ref 65–105)
GLUCOSE BLD-MCNC: 118 MG/DL (ref 65–105)
GLUCOSE BLD-MCNC: 155 MG/DL (ref 65–105)
GLUCOSE BLD-MCNC: 180 MG/DL (ref 65–105)
GLUCOSE BLD-MCNC: 213 MG/DL (ref 65–105)
GLUCOSE SERPL-MCNC: 200 MG/DL (ref 70–99)
GLUCOSE SERPL-MCNC: 223 MG/DL (ref 70–99)
HCT VFR BLD AUTO: 25.5 % (ref 36–46)
HGB BLD-MCNC: 8.2 G/DL (ref 12–16)
LYMPHOCYTES NFR BLD: 1.76 K/UL (ref 1–4.8)
LYMPHOCYTES RELATIVE PERCENT: 13 % (ref 24–44)
MAGNESIUM SERPL-MCNC: 2.1 MG/DL (ref 1.6–2.6)
MCH RBC QN AUTO: 29.2 PG (ref 26–34)
MCHC RBC AUTO-ENTMCNC: 32.3 G/DL (ref 31–37)
MCV RBC AUTO: 90.6 FL (ref 80–100)
MONOCYTES NFR BLD: 1.62 K/UL (ref 0.1–1.3)
MONOCYTES NFR BLD: 12 % (ref 1–7)
MORPHOLOGY: ABNORMAL
MORPHOLOGY: ABNORMAL
NEUTROPHILS NFR BLD: 71 % (ref 36–66)
NEUTS SEG NFR BLD: 9.58 K/UL (ref 1.3–9.1)
PHOSPHATE SERPL-MCNC: 3 MG/DL (ref 2.6–4.5)
PLATELET # BLD AUTO: 359 K/UL (ref 150–450)
PMV BLD AUTO: 8.4 FL (ref 6–12)
POTASSIUM SERPL-SCNC: 5.4 MMOL/L (ref 3.7–5.3)
POTASSIUM SERPL-SCNC: 5.5 MMOL/L (ref 3.7–5.3)
POTASSIUM SERPL-SCNC: 5.6 MMOL/L (ref 3.7–5.3)
RBC # BLD AUTO: 2.81 M/UL (ref 4–5.2)
SODIUM SERPL-SCNC: 131 MMOL/L (ref 135–144)
SODIUM SERPL-SCNC: 133 MMOL/L (ref 135–144)
WBC OTHER # BLD: 13.5 K/UL (ref 3.5–11)

## 2023-09-23 PROCEDURE — 99232 SBSQ HOSP IP/OBS MODERATE 35: CPT | Performed by: NURSE PRACTITIONER

## 2023-09-23 PROCEDURE — 80048 BASIC METABOLIC PNL TOTAL CA: CPT

## 2023-09-23 PROCEDURE — 6370000000 HC RX 637 (ALT 250 FOR IP): Performed by: INTERNAL MEDICINE

## 2023-09-23 PROCEDURE — 84100 ASSAY OF PHOSPHORUS: CPT

## 2023-09-23 PROCEDURE — 6360000002 HC RX W HCPCS: Performed by: INTERNAL MEDICINE

## 2023-09-23 PROCEDURE — 84132 ASSAY OF SERUM POTASSIUM: CPT

## 2023-09-23 PROCEDURE — 2580000003 HC RX 258: Performed by: INTERNAL MEDICINE

## 2023-09-23 PROCEDURE — 2060000000 HC ICU INTERMEDIATE R&B

## 2023-09-23 PROCEDURE — 85025 COMPLETE CBC W/AUTO DIFF WBC: CPT

## 2023-09-23 PROCEDURE — 2500000003 HC RX 250 WO HCPCS: Performed by: INTERNAL MEDICINE

## 2023-09-23 PROCEDURE — 6360000002 HC RX W HCPCS: Performed by: NURSE PRACTITIONER

## 2023-09-23 PROCEDURE — 99233 SBSQ HOSP IP/OBS HIGH 50: CPT | Performed by: INTERNAL MEDICINE

## 2023-09-23 PROCEDURE — 82947 ASSAY GLUCOSE BLOOD QUANT: CPT

## 2023-09-23 PROCEDURE — 6370000000 HC RX 637 (ALT 250 FOR IP): Performed by: NURSE PRACTITIONER

## 2023-09-23 PROCEDURE — 83735 ASSAY OF MAGNESIUM: CPT

## 2023-09-23 PROCEDURE — 99024 POSTOP FOLLOW-UP VISIT: CPT | Performed by: SURGERY

## 2023-09-23 PROCEDURE — 6360000002 HC RX W HCPCS: Performed by: SURGERY

## 2023-09-23 PROCEDURE — 36415 COLL VENOUS BLD VENIPUNCTURE: CPT

## 2023-09-23 RX ORDER — CALCIUM GLUCONATE 10 MG/ML
1000 INJECTION, SOLUTION INTRAVENOUS ONCE
Status: COMPLETED | OUTPATIENT
Start: 2023-09-23 | End: 2023-09-23

## 2023-09-23 RX ORDER — TRAMADOL HYDROCHLORIDE 50 MG/1
50 TABLET ORAL EVERY 6 HOURS PRN
Status: DISCONTINUED | OUTPATIENT
Start: 2023-09-23 | End: 2023-09-29 | Stop reason: HOSPADM

## 2023-09-23 RX ORDER — CARVEDILOL 6.25 MG/1
6.25 TABLET ORAL 2 TIMES DAILY WITH MEALS
Status: DISCONTINUED | OUTPATIENT
Start: 2023-09-23 | End: 2023-09-25

## 2023-09-23 RX ORDER — CALCIUM GLUCONATE 94 MG/ML
1000 INJECTION, SOLUTION INTRAVENOUS ONCE
Status: DISCONTINUED | OUTPATIENT
Start: 2023-09-23 | End: 2023-09-23

## 2023-09-23 RX ORDER — TRAMADOL HYDROCHLORIDE 50 MG/1
100 TABLET ORAL EVERY 6 HOURS PRN
Status: DISCONTINUED | OUTPATIENT
Start: 2023-09-23 | End: 2023-09-29 | Stop reason: HOSPADM

## 2023-09-23 RX ADMIN — GABAPENTIN 300 MG: 300 CAPSULE ORAL at 20:37

## 2023-09-23 RX ADMIN — FENTANYL CITRATE 50 MCG: 0.05 INJECTION, SOLUTION INTRAMUSCULAR; INTRAVENOUS at 11:08

## 2023-09-23 RX ADMIN — PANTOPRAZOLE SODIUM 40 MG: 40 TABLET, DELAYED RELEASE ORAL at 05:26

## 2023-09-23 RX ADMIN — FENTANYL CITRATE 50 MCG: 0.05 INJECTION, SOLUTION INTRAMUSCULAR; INTRAVENOUS at 14:12

## 2023-09-23 RX ADMIN — TRAMADOL HYDROCHLORIDE 100 MG: 50 TABLET, COATED ORAL at 16:33

## 2023-09-23 RX ADMIN — INSULIN LISPRO 2 UNITS: 100 INJECTION, SOLUTION INTRAVENOUS; SUBCUTANEOUS at 13:49

## 2023-09-23 RX ADMIN — PRAVASTATIN SODIUM 40 MG: 40 TABLET ORAL at 20:37

## 2023-09-23 RX ADMIN — INSULIN HUMAN 10 UNITS: 100 INJECTION, SOLUTION PARENTERAL at 13:50

## 2023-09-23 RX ADMIN — METRONIDAZOLE 500 MG: 500 TABLET ORAL at 05:26

## 2023-09-23 RX ADMIN — METRONIDAZOLE 500 MG: 500 TABLET ORAL at 20:37

## 2023-09-23 RX ADMIN — METOPROLOL TARTRATE 5 MG: 5 INJECTION, SOLUTION INTRAVENOUS at 11:15

## 2023-09-23 RX ADMIN — CEFTRIAXONE SODIUM 2000 MG: 2 INJECTION, POWDER, FOR SOLUTION INTRAMUSCULAR; INTRAVENOUS at 20:32

## 2023-09-23 RX ADMIN — CALCIUM GLUCONATE 1000 MG: 10 INJECTION, SOLUTION INTRAVENOUS at 15:54

## 2023-09-23 RX ADMIN — ENOXAPARIN SODIUM 40 MG: 100 INJECTION SUBCUTANEOUS at 11:16

## 2023-09-23 RX ADMIN — CARVEDILOL 6.25 MG: 6.25 TABLET, FILM COATED ORAL at 18:47

## 2023-09-23 RX ADMIN — DEXTROSE MONOHYDRATE 250 ML: 100 INJECTION, SOLUTION INTRAVENOUS at 14:01

## 2023-09-23 RX ADMIN — GABAPENTIN 300 MG: 300 CAPSULE ORAL at 11:16

## 2023-09-23 RX ADMIN — METOPROLOL TARTRATE 5 MG: 5 INJECTION, SOLUTION INTRAVENOUS at 03:09

## 2023-09-23 RX ADMIN — METRONIDAZOLE 500 MG: 500 TABLET ORAL at 13:48

## 2023-09-23 RX ADMIN — FENTANYL CITRATE 50 MCG: 0.05 INJECTION, SOLUTION INTRAMUSCULAR; INTRAVENOUS at 04:38

## 2023-09-23 RX ADMIN — LOSARTAN POTASSIUM 50 MG: 50 TABLET, FILM COATED ORAL at 11:17

## 2023-09-23 ASSESSMENT — PAIN SCALES - GENERAL
PAINLEVEL_OUTOF10: 9
PAINLEVEL_OUTOF10: 9
PAINLEVEL_OUTOF10: 7
PAINLEVEL_OUTOF10: 2
PAINLEVEL_OUTOF10: 9

## 2023-09-23 ASSESSMENT — PAIN - FUNCTIONAL ASSESSMENT
PAIN_FUNCTIONAL_ASSESSMENT: ACTIVITIES ARE NOT PREVENTED

## 2023-09-23 ASSESSMENT — PAIN DESCRIPTION - LOCATION
LOCATION: ABDOMEN;NECK
LOCATION: ABDOMEN;NECK
LOCATION: NECK;ABDOMEN
LOCATION: NECK;ABDOMEN

## 2023-09-23 ASSESSMENT — PAIN DESCRIPTION - DESCRIPTORS
DESCRIPTORS: ACHING;NAGGING
DESCRIPTORS: ACHING

## 2023-09-23 ASSESSMENT — PAIN DESCRIPTION - ORIENTATION
ORIENTATION: RIGHT;LEFT
ORIENTATION: MID;LEFT
ORIENTATION: RIGHT;LEFT

## 2023-09-23 NOTE — PROGRESS NOTES
81yo female POD#8 s/p duodenal repair. Doing well. Nausea improved, having Bms, tolerating reg diet. Leukocytosis stable (~13). Hyperkalemia on AM labs. D/c TPN, perform EKG now and repeat BMP. If she still have leukocytosis tomorrow, will perform CT abd/pelvis with IV/PO contrast to assess for abscess. I have discussed this with the patient and her children.      Gia Delatorre MD

## 2023-09-23 NOTE — PROGRESS NOTES
Nutrition Note    Elevated K noted (5.4). TPN contains a significant amount of potassium. RN states surgery has approved wean and discontinuation of TPN. Note also Losartan resumed 9/21. Dayron Ling R.D., LSHAZIA.   Phone: [phone]

## 2023-09-23 NOTE — PROGRESS NOTES
Comprehensive Nutrition Assessment    Type and Reason for Visit:  Reassess    Nutrition Recommendations/Plan:   Continue current diet and oral nutrition supplements. TPN has been discontinued. Monitor glucose and K.      Malnutrition Assessment:  Malnutrition Status: At risk for malnutrition (Comment) (09/17/23 1352)    Context:  Acute Illness     Findings of the 6 clinical characteristics of malnutrition:  Energy Intake:  50% or less of estimated energy requirements for 5 or more days  Weight Loss:  Unable to assess     Body Fat Loss:  Unable to assess     Muscle Mass Loss:  Unable to assess    Fluid Accumulation:  Mild Extremities   Strength:  Not Performed    Nutrition Assessment:    K was rechecked with result of 5.5. TPN has been discontinued. D10 and insulin given. Note K to be checked again later today. Pt and daughter report that pt has been eating well. Pt ate the majority of breakfast and drank Ensure, which she likes. Pt had not consumed lunch yet at time of visit. If K remains elevated, consider dietary modifications. Nutrition Related Findings:    Trace edema KIRA WALKER (9/22). Labs and meds reviewed. Wound Type: Surgical Incision       Current Nutrition Intake & Therapies:    Average Meal Intake: %  Average Supplements Intake: %  ADULT ORAL NUTRITION SUPPLEMENT; Breakfast, Dinner; Low Calorie/High Protein Oral Supplement  ADULT DIET; Regular; 4 carb choices (60 gm/meal)    Anthropometric Measures:  Height: 5' (152.4 cm)  Ideal Body Weight (IBW): 100 lbs (45 kg)    Admission Body Weight: 130 lb (59 kg)  Current Body Weight: 130 lb 15.3 oz (59.4 kg) (9/21),   IBW. Weight Source: Bed Scale  Current BMI (kg/m2): 25.6                          BMI Categories: Overweight (BMI 25.0-29. 9)    Estimated Daily Nutrient Needs:  Energy Requirements Based On: Kcal/kg  Weight Used for Energy Requirements: Admission  Energy (kcal/day): 1475 kcals based on 25 kcals/kg (revised)  Weight Used for

## 2023-09-23 NOTE — PLAN OF CARE
Problem: Discharge Planning  Goal: Discharge to home or other facility with appropriate resources  9/23/2023 0325 by Shashi Linton RN  Outcome: Progressing     Problem: Safety - Adult  Goal: Free from fall injury  9/23/2023 0325 by Shashi Linton RN  Outcome: Progressing     Problem: Pain  Goal: Verbalizes/displays adequate comfort level or baseline comfort level  9/23/2023 0325 by Shashi Linton RN  Outcome: Progressing  Flowsheets (Taken 9/23/2023 0325)  Verbalizes/displays adequate comfort level or baseline comfort level:   Encourage patient to monitor pain and request assistance   Assess pain using appropriate pain scale   Administer analgesics based on type and severity of pain and evaluate response     Problem: Chronic Conditions and Co-morbidities  Goal: Patient's chronic conditions and co-morbidity symptoms are monitored and maintained or improved  9/23/2023 0325 by Shashi Linton RN  Outcome: Progressing     Problem: Skin/Tissue Integrity  Goal: Absence of new skin breakdown  Description: 1. Monitor for areas of redness and/or skin breakdown  2. Assess vascular access sites hourly  3. Every 4-6 hours minimum:  Change oxygen saturation probe site  4. Every 4-6 hours:  If on nasal continuous positive airway pressure, respiratory therapy assess nares and determine need for appliance change or resting period.   9/23/2023 0325 by Shashi Linton RN  Outcome: Progressing     Problem: Nutrition Deficit:  Goal: Optimize nutritional status  9/23/2023 0325 by Shashi Linton RN  Outcome: Progressing  Flowsheets (Taken 9/23/2023 0325)  Nutrient intake appropriate for improving, restoring, or maintaining nutritional needs:   Assess nutritional status and recommend course of action   Monitor oral intake, labs, and treatment plans   Recommend, monitor, and adjust tube feedings and TPN/PPN based on assessed needs

## 2023-09-23 NOTE — PROGRESS NOTES
[address] [facility] Internal Medicine  [facility] Medicine  Jossy Trotter MD; Manoj Ramey MD; Joe Lemus MD; MD Tae Barrientos MD; Abhay Abreu MD; Tone Burger MD        Progress Note    9/23/2023    12:59 PM    Name:   Tamra Gipson  MRN:     [id number]     Acct:      [de-identified]   Room:   2114/2114-01  IP Day:  11  Admit Date:  9/12/2023  3:31 AM    PCP:   No primary care provider on file. Code Status:  Full Code    Subjective:     C/C: No chief complaint on file. Abdominal pain      Interval History Status: Improving    HPI:     Initially admitted for management of abdominal pain, noted to have choledocholithiasis  9/18  Patient, clinically doing almost the same  Complaining of abdominal pain  NG in situ  Had episode of vomiting , during UGI STUDIES   Did not pass stools, flatus  9/20   Patient, clinically doing better  Working with therapy  On IV antibiotic  Passing stools  On TPN    9/21  Patient clinically doing better  Abdominal pain is improving  Nauseous but no vomiting  Tolerating clear liquid diet which is getting advanced  Reglan  On TPN    9/22  Patient seen and examined, was sitting on the chair today, states that she is feeling good, still have some abdominal pain,  Diet advanced today  Did not have any bowel movement today and yesterday, passing gas      8/23  Patient seen and examined, off TPN, states that she is feeling better had bowel movement today  Afebrile    Review of Systems:     Denies any shortness of breath or cough  Denies chest pain or palpitations  Appropriate postsurgical abdominal pain, no vomiting  Denies any new numbness tremors or weakness. Medications: Allergies:     Allergies   Allergen Reactions    Ace Inhibitors Nausea Only    Mixed Ragweed     Sulfa Antibiotics Nausea Only    Contrast [Iodides] Nausea And Vomiting    Pcn [Penicillins] Rash       Current Meds:   Scheduled Meds:    insulin regular

## 2023-09-24 ENCOUNTER — APPOINTMENT (OUTPATIENT)
Dept: CT IMAGING | Age: 83
DRG: 981 | End: 2023-09-24
Attending: INTERNAL MEDICINE
Payer: MEDICARE

## 2023-09-24 LAB
ANION GAP SERPL CALCULATED.3IONS-SCNC: 5 MMOL/L (ref 9–17)
BASOPHILS # BLD: 0 K/UL (ref 0–0.2)
BASOPHILS NFR BLD: 0 % (ref 0–2)
BUN SERPL-MCNC: 32 MG/DL (ref 8–23)
CALCIUM SERPL-MCNC: 9.6 MG/DL (ref 8.6–10.4)
CHLORIDE SERPL-SCNC: 100 MMOL/L (ref 98–107)
CO2 SERPL-SCNC: 24 MMOL/L (ref 20–31)
CREAT SERPL-MCNC: 0.7 MG/DL (ref 0.5–0.9)
EOSINOPHIL # BLD: 0.5 K/UL (ref 0–0.4)
EOSINOPHILS RELATIVE PERCENT: 4 % (ref 0–4)
ERYTHROCYTE [DISTWIDTH] IN BLOOD BY AUTOMATED COUNT: 13.5 % (ref 11.5–14.9)
GFR SERPL CREATININE-BSD FRML MDRD: >60 ML/MIN/1.73M2
GLUCOSE BLD-MCNC: 101 MG/DL (ref 65–105)
GLUCOSE BLD-MCNC: 103 MG/DL (ref 65–105)
GLUCOSE BLD-MCNC: 104 MG/DL (ref 65–105)
GLUCOSE BLD-MCNC: 123 MG/DL (ref 65–105)
GLUCOSE BLD-MCNC: 132 MG/DL (ref 65–105)
GLUCOSE BLD-MCNC: 134 MG/DL (ref 65–105)
GLUCOSE BLD-MCNC: 98 MG/DL (ref 65–105)
GLUCOSE SERPL-MCNC: 96 MG/DL (ref 70–99)
HCT VFR BLD AUTO: 25.8 % (ref 36–46)
HGB BLD-MCNC: 8.1 G/DL (ref 12–16)
LYMPHOCYTES NFR BLD: 1.9 K/UL (ref 1–4.8)
LYMPHOCYTES RELATIVE PERCENT: 14 % (ref 24–44)
MAGNESIUM SERPL-MCNC: 2.1 MG/DL (ref 1.6–2.6)
MCH RBC QN AUTO: 28.6 PG (ref 26–34)
MCHC RBC AUTO-ENTMCNC: 31.2 G/DL (ref 31–37)
MCV RBC AUTO: 91.7 FL (ref 80–100)
MONOCYTES NFR BLD: 1.5 K/UL (ref 0.1–1.3)
MONOCYTES NFR BLD: 11 % (ref 1–7)
NEUTROPHILS NFR BLD: 71 % (ref 36–66)
NEUTS SEG NFR BLD: 9.5 K/UL (ref 1.3–9.1)
OSMOLALITY SERPL: 279 MOSM/KG (ref 275–295)
PHOSPHATE SERPL-MCNC: 3.9 MG/DL (ref 2.6–4.5)
PLATELET # BLD AUTO: 330 K/UL (ref 150–450)
PMV BLD AUTO: 8.3 FL (ref 6–12)
POTASSIUM SERPL-SCNC: 5 MMOL/L (ref 3.7–5.3)
POTASSIUM SERPL-SCNC: 5.7 MMOL/L (ref 3.7–5.3)
POTASSIUM SERPL-SCNC: 5.9 MMOL/L (ref 3.7–5.3)
POTASSIUM SERPL-SCNC: 6.3 MMOL/L (ref 3.7–5.3)
RBC # BLD AUTO: 2.81 M/UL (ref 4–5.2)
SODIUM SERPL-SCNC: 126 MMOL/L (ref 135–144)
SODIUM SERPL-SCNC: 128 MMOL/L (ref 135–144)
SODIUM SERPL-SCNC: 129 MMOL/L (ref 135–144)
WBC OTHER # BLD: 13.4 K/UL (ref 3.5–11)

## 2023-09-24 PROCEDURE — 87040 BLOOD CULTURE FOR BACTERIA: CPT

## 2023-09-24 PROCEDURE — 97110 THERAPEUTIC EXERCISES: CPT

## 2023-09-24 PROCEDURE — 2500000003 HC RX 250 WO HCPCS

## 2023-09-24 PROCEDURE — 6370000000 HC RX 637 (ALT 250 FOR IP): Performed by: INTERNAL MEDICINE

## 2023-09-24 PROCEDURE — 2060000000 HC ICU INTERMEDIATE R&B

## 2023-09-24 PROCEDURE — 6360000002 HC RX W HCPCS: Performed by: INTERNAL MEDICINE

## 2023-09-24 PROCEDURE — 82947 ASSAY GLUCOSE BLOOD QUANT: CPT

## 2023-09-24 PROCEDURE — 6370000000 HC RX 637 (ALT 250 FOR IP): Performed by: NURSE PRACTITIONER

## 2023-09-24 PROCEDURE — 97530 THERAPEUTIC ACTIVITIES: CPT

## 2023-09-24 PROCEDURE — 80048 BASIC METABOLIC PNL TOTAL CA: CPT

## 2023-09-24 PROCEDURE — 2580000003 HC RX 258: Performed by: INTERNAL MEDICINE

## 2023-09-24 PROCEDURE — 85025 COMPLETE CBC W/AUTO DIFF WBC: CPT

## 2023-09-24 PROCEDURE — 99232 SBSQ HOSP IP/OBS MODERATE 35: CPT | Performed by: NURSE PRACTITIONER

## 2023-09-24 PROCEDURE — 84100 ASSAY OF PHOSPHORUS: CPT

## 2023-09-24 PROCEDURE — 2580000003 HC RX 258: Performed by: NURSE PRACTITIONER

## 2023-09-24 PROCEDURE — 6360000002 HC RX W HCPCS: Performed by: NURSE PRACTITIONER

## 2023-09-24 PROCEDURE — 36415 COLL VENOUS BLD VENIPUNCTURE: CPT

## 2023-09-24 PROCEDURE — 83735 ASSAY OF MAGNESIUM: CPT

## 2023-09-24 PROCEDURE — 51798 US URINE CAPACITY MEASURE: CPT

## 2023-09-24 PROCEDURE — 84295 ASSAY OF SERUM SODIUM: CPT

## 2023-09-24 PROCEDURE — 99233 SBSQ HOSP IP/OBS HIGH 50: CPT | Performed by: INTERNAL MEDICINE

## 2023-09-24 PROCEDURE — 84132 ASSAY OF SERUM POTASSIUM: CPT

## 2023-09-24 PROCEDURE — 83930 ASSAY OF BLOOD OSMOLALITY: CPT

## 2023-09-24 PROCEDURE — 99024 POSTOP FOLLOW-UP VISIT: CPT | Performed by: SURGERY

## 2023-09-24 PROCEDURE — 74176 CT ABD & PELVIS W/O CONTRAST: CPT

## 2023-09-24 RX ORDER — DEXTROSE MONOHYDRATE 100 MG/ML
INJECTION, SOLUTION INTRAVENOUS CONTINUOUS PRN
Status: DISCONTINUED | OUTPATIENT
Start: 2023-09-24 | End: 2023-09-29 | Stop reason: HOSPADM

## 2023-09-24 RX ADMIN — TRAMADOL HYDROCHLORIDE 100 MG: 50 TABLET, COATED ORAL at 21:44

## 2023-09-24 RX ADMIN — CARVEDILOL 6.25 MG: 6.25 TABLET, FILM COATED ORAL at 19:45

## 2023-09-24 RX ADMIN — PANTOPRAZOLE SODIUM 40 MG: 40 TABLET, DELAYED RELEASE ORAL at 06:25

## 2023-09-24 RX ADMIN — ONDANSETRON 4 MG: 2 INJECTION INTRAMUSCULAR; INTRAVENOUS at 10:37

## 2023-09-24 RX ADMIN — INSULIN HUMAN 10 UNITS: 100 INJECTION, SOLUTION PARENTERAL at 06:44

## 2023-09-24 RX ADMIN — BARIUM SULFATE 450 ML: 20 SUSPENSION ORAL at 10:45

## 2023-09-24 RX ADMIN — SODIUM ZIRCONIUM CYCLOSILICATE 10 G: 5 POWDER, FOR SUSPENSION ORAL at 11:44

## 2023-09-24 RX ADMIN — METRONIDAZOLE 500 MG: 500 TABLET ORAL at 21:44

## 2023-09-24 RX ADMIN — SODIUM CHLORIDE, PRESERVATIVE FREE 10 ML: 5 INJECTION INTRAVENOUS at 10:34

## 2023-09-24 RX ADMIN — ENOXAPARIN SODIUM 40 MG: 100 INJECTION SUBCUTANEOUS at 11:01

## 2023-09-24 RX ADMIN — INSULIN HUMAN 10 UNITS: 100 INJECTION, SOLUTION PARENTERAL at 11:52

## 2023-09-24 RX ADMIN — PRAVASTATIN SODIUM 40 MG: 40 TABLET ORAL at 21:44

## 2023-09-24 RX ADMIN — TRAMADOL HYDROCHLORIDE 100 MG: 50 TABLET, COATED ORAL at 08:48

## 2023-09-24 RX ADMIN — GABAPENTIN 300 MG: 300 CAPSULE ORAL at 10:34

## 2023-09-24 RX ADMIN — TRAMADOL HYDROCHLORIDE 100 MG: 50 TABLET, COATED ORAL at 00:15

## 2023-09-24 RX ADMIN — CARVEDILOL 6.25 MG: 6.25 TABLET, FILM COATED ORAL at 10:33

## 2023-09-24 RX ADMIN — METRONIDAZOLE 500 MG: 500 TABLET ORAL at 06:25

## 2023-09-24 RX ADMIN — CEFTRIAXONE SODIUM 2000 MG: 2 INJECTION, POWDER, FOR SOLUTION INTRAMUSCULAR; INTRAVENOUS at 21:31

## 2023-09-24 RX ADMIN — DEXTROSE MONOHYDRATE 250 ML: 100 INJECTION, SOLUTION INTRAVENOUS at 12:01

## 2023-09-24 RX ADMIN — SODIUM ZIRCONIUM CYCLOSILICATE 10 G: 5 POWDER, FOR SUSPENSION ORAL at 20:07

## 2023-09-24 RX ADMIN — SODIUM CHLORIDE, PRESERVATIVE FREE 10 ML: 5 INJECTION INTRAVENOUS at 21:45

## 2023-09-24 RX ADMIN — METRONIDAZOLE 500 MG: 500 TABLET ORAL at 15:03

## 2023-09-24 RX ADMIN — GABAPENTIN 300 MG: 300 CAPSULE ORAL at 21:44

## 2023-09-24 RX ADMIN — DEXTROSE MONOHYDRATE 250 ML: 100 INJECTION, SOLUTION INTRAVENOUS at 06:51

## 2023-09-24 ASSESSMENT — PAIN DESCRIPTION - LOCATION: LOCATION: NECK

## 2023-09-24 ASSESSMENT — PAIN SCALES - GENERAL
PAINLEVEL_OUTOF10: 7
PAINLEVEL_OUTOF10: 3
PAINLEVEL_OUTOF10: 8
PAINLEVEL_OUTOF10: 8

## 2023-09-24 ASSESSMENT — PAIN DESCRIPTION - DESCRIPTORS: DESCRIPTORS: ACHING

## 2023-09-24 NOTE — PROGRESS NOTES
[facility] -   Infectious diseases evaluation  admission date 9/12/2023    reason for consultation:   Bacteremia    Impression :   Current:  E. coli bacteremia, likely biliary source  Abdominal pain/elevated liver enzymes and biliary dilatation. Status post*upper GI endoscopy with EUS 9/15/2023  Perforated bowel status post repair of duodenal perforation 9/15/2023  Leukocytosis  Diabetes mellitus  Hypertension  Hyperlipidemia  History of breast cancer  Allergies to Sulfa, PCN. Tolerating Ceftriaxone. Recommendations   ceftriaxone 2g IV daily  through 9/26/2023  Flagyl 500 mg po every 8 hours  through 9/26/2023  Follow CBC and renal function  Supportive care. Discussed with patient and daughter at the bedside    Infection Control Recommendations   Mead Precautions      Antimicrobial Stewardship Recommendations   Simplification of therapy  Targeted therapy      History of Present Illness:   Initial history:  Boyd Mello is a 82y.o.-year-old female who was transferred from [facility] 9/12/2023. She presented with right upper quadrant abdominal pain for several days associated with the nausea and vomiting. Symptoms moderate to severe, no alleviating or aggravating factors. CT abdomen pelvis completed at [facility] showed biliary duct dilatation with the common bile duct measuring 1.4 cm. Liver enzymes were elevated with ALT of 130, , WBC 12.2. Blood cultures done at [facility] grew E. coli that was sensitive to cefazolin and ceftriaxone. Gallbladder ultrasound showed gallbladder sludge with dilated common bile duct  GI consulted, MRCP was completed but limited due to motion artifact. She had EUS 6/38/6760, complicated by perforated bowel status post duodenal perforation repair 9/15/2023. Interval changes  9/23/2023   T 100. Low-grade temps off and on. Denies any chills, n/v/d.   Midline abdominal incision

## 2023-09-24 NOTE — PROGRESS NOTES
[address] [facility] Internal Medicine  [address] [facility] Internal Medicine  Brianna Hartley MD; Angela Rivas MD; Genesis Meehan MD; Winslow Dubin, MD Melynda Dura, MD; Eliud Gonzalez MD; Blanca Denis MD        Progress Note    9/24/2023    12:12 PM    Name:   Kristen Orona  MRN:     [id number]     Acct:      [de-identified]   Room:   211/[facility]  IP Day:  12  Admit Date:  9/12/2023  3:31 AM    PCP:   No primary care provider on file. Code Status:  Full Code    Subjective:     C/C: No chief complaint on file. Abdominal pain      Interval History Status: Improving    HPI:     Initially admitted for management of abdominal pain, noted to have choledocholithiasis  9/18  Patient, clinically doing almost the same  Complaining of abdominal pain  NG in situ  Had episode of vomiting , during UGI STUDIES   Did not pass stools, flatus  9/20   Patient, clinically doing better  Working with therapy  On IV antibiotic  Passing stools  On TPN    9/21  Patient clinically doing better  Abdominal pain is improving  Nauseous but no vomiting  Tolerating clear liquid diet which is getting advanced  Reglan  On TPN    9/22  Patient seen and examined, was sitting on the chair today, states that she is feeling good, still have some abdominal pain,  Diet advanced today  Did not have any bowel movement today and yesterday, passing gas      8/23  Patient seen and examined, off TPN, states that she is feeling better had bowel movement today  Afebrile    /24  Patient seen and examined, states that she has been feeling okay complaining of pain, afebrile no chest pain shortness of breath    Review of Systems:     Denies any shortness of breath or cough  Denies chest pain or palpitations  Appropriate postsurgical abdominal pain, no vomiting  Denies any new numbness tremors or weakness. Medications: Allergies:     Allergies   Allergen Reactions    Ace Inhibitors Nausea Only    Mixed Ragweed     Sulfa Antibiotics board, abdominal CT ordered  Will repeat blood cultures as well  On Rocephin and Flagyl  Diet has been advanced  Did have bowel movement yesterday  Has been off TPN  Hemoglobin 8.1, continue to monitor.   Mirela Contreras MD  9/24/2023  12:12 PM

## 2023-09-24 NOTE — PROGRESS NOTES
Rn notified Dr. Mayela Sanders of bladder scan 351ml an K recheck of 5.7. New orders place babb and give Marcy Ashby at 2000 an recheck in 2hrs STAT order. Call if K is >5.1.

## 2023-09-24 NOTE — PLAN OF CARE
Problem: Discharge Planning  Goal: Discharge to home or other facility with appropriate resources  9/24/2023 0342 by Ena Fairbanks RN  Outcome: Progressing       Problem: Safety - Adult  Goal: Free from fall injury  9/24/2023 0342 by Ena Fairbanks RN  Outcome: Progressing         Problem: Pain  Goal: Verbalizes/displays adequate comfort level or baseline comfort level  9/24/2023 0342 by Ena Fairbanks RN  Outcome: Progressing       Problem: Chronic Conditions and Co-morbidities  Goal: Patient's chronic conditions and co-morbidity symptoms are monitored and maintained or improved  9/24/2023 0342 by Ena Fairbanks RN  Outcome: Progressing    Problem: Skin/Tissue Integrity  Goal: Absence of new skin breakdown  Description: 1. Monitor for areas of redness and/or skin breakdown  2. Assess vascular access sites hourly  3. Every 4-6 hours minimum:  Change oxygen saturation probe site  4. Every 4-6 hours:  If on nasal continuous positive airway pressure, respiratory therapy assess nares and determine need for appliance change or resting period.   9/24/2023 0342 by Ena Fairbanks RN  Outcome: Progressing       Problem: ABCDS Injury Assessment  Goal: Absence of physical injury  9/24/2023 0342 by Ena Fairbanks RN  Outcome: Progressing    Problem: Nutrition Deficit:  Goal: Optimize nutritional status  9/24/2023 0342 by Ena Fairbanks RN  Outcome: Progressing

## 2023-09-24 NOTE — PLAN OF CARE
Problem: Discharge Planning  Goal: Discharge to home or other facility with appropriate resources  Outcome: Progressing     Problem: Safety - Adult  Goal: Free from fall injury  Outcome: Progressing     Problem: Pain  Goal: Verbalizes/displays adequate comfort level or baseline comfort level  Outcome: Progressing     Problem: Chronic Conditions and Co-morbidities  Goal: Patient's chronic conditions and co-morbidity symptoms are monitored and maintained or improved  Outcome: Progressing     Problem: Skin/Tissue Integrity  Goal: Absence of new skin breakdown  Description: 1.   Monitor for areas of redness and/or skin breakdown  Outcome: Progressing     Problem: ABCDS Injury Assessment  Goal: Absence of physical injury  Outcome: Progressing     Problem: Nutrition Deficit:  Goal: Optimize nutritional status  Outcome: Progressing  Flowsheets (Taken 9/23/2023 1355 by Víctor Willingham, RD, LD)  Nutrient intake appropriate for improving, restoring, or maintaining nutritional needs: Monitor oral intake, labs, and treatment plans

## 2023-09-24 NOTE — CONSULTS
NEPHROLOGY CONSULT       Reason for Consult: Acute hyperkalemia      Chief Complaint: Patient presented with abdominal pain  History Obtained From: EHR Chart records    History of Present Illness: This is a 82 y.o. female with history of type 2 diabetes mellitus hypertension who had initially presented on 9/12/2023 with abdominal pain and managed for choledocholithiasis with E. coli bacteremia, complicated by perforated bowel status post repair of duodenal perforation on 9/15/2023. She had been on total parenteral nutrition which was discontinued yesterday and transitioned to advanced diet. Nephrology was consulted today for acute hyperkalemia of 6.3 mmol/l. Renal function had been stable with baseline creatinine of 0.6 mg/dL. Patient had a potassium of 5.4 mmol/L on 9/22/23 which increased to 5.6 and 6.3 mmol/l this morning. Sodium was 129 mmol/L  Patient has been on losartan daily and potassium in the TPN. No NSAID use, no evidence of urinary retention with urine output of 1.1 L yesterday. Patient has been hemodynamically stable. Acid-base status is normal.Patient initially received D50 insulin x2 and started on Lokelma today.         Past Medical History:        Diagnosis Date    Arthritis     Cancer ([address])     Chronic back pain     Diabetes mellitus ([address])     DM (diabetes mellitus) ([address])     GERD (gastroesophageal reflux disease)     Hypertension     Spinal stenosis     Spondylolysis        Past Surgical History:        Procedure Laterality Date    BACK SURGERY      BREAST SURGERY      JOINT REPLACEMENT      LAPAROTOMY N/A 9/15/2023    LAPAROTOMY EXPLORATORY Drain placement performed by Elly Rangel MD at  [address]  9/15/2023    EGD ESOPHAGOGASTRODUODENOSCOPY performed by Elly Rangel MD at [address] N/A 9/15/2023    EGD BIOPSY EUS ULTRASOUND LINEAR performed by Matthieu Larios MD at [address] [facility]

## 2023-09-24 NOTE — PROGRESS NOTES
Physical Therapy  Facility/Department: [facility] PROGRESSIVE CARE  Daily Treatment Note  NAME: Johnny Breaux  : 1940  MRN: [id number]    Date of Service: 2023    Discharge Recommendations:  Patient would benefit from continued therapy after discharge, Therapy recommended at discharge        Patient Diagnosis(es): The primary encounter diagnosis was Cardiac arrhythmia, unspecified cardiac arrhythmia type. Diagnoses of Calculus of gallbladder with acute cholecystitis without obstruction and Perforated bowel ([address]) were also pertinent to this visit. Assessment   Activity Tolerance: Patient tolerated treatment well     Plan    Physcial Therapy Plan  General Plan: 5-7 times per week  Specific Instructions for Next Treatment: steps  Current Treatment Recommendations: Strengthening;Gait training;Balance training;Functional mobility training;Stair training; Safety education & training; Therapeutic activities; Patient/Caregiver education & training;Transfer training; Endurance training;Equipment evaluation, education, & procurement     Restrictions  Restrictions/Precautions  Restrictions/Precautions: Fall Risk  Required Braces or Orthoses?: No  Implants present? : Metal implants (right TKR)  Position Activity Restriction  Other position/activity restrictions: NO BP, IV sticks or venipunctures right UE. Subjective    Subjective  Subjective: Pt in bed; agreeable to PT. Daughter arrived during session. Pt left in chair after session with call light in reach and breakfast tray readily available. Pain: 7-8/10 abdominal pain; RN administered pain medication prior to mobility  Orientation  Overall Orientation Status: Within Functional Limits     Objective   Bed Mobility Training  Bed Mobility Training: Yes  Interventions: Safety awareness training;Verbal cues; Tactile cues  Rolling:  Moderate assistance  Supine to Sit: Moderate assistance;Assist X1;Additional time (cues given for sequencing; pt relies heavily on tactile cues

## 2023-09-24 NOTE — CARE COORDINATION
DISCHARGE PLANNING NOTE:    POD #9 duodenal repair. TPN complete 9/23. Auth good through 9/24 per LSW note. Surgery would like to have CT abd/pelvis with PO contrast.    Nephro consulted for acute hyperkalemia. ID-rocephin 2 g daily through 9/26, PO flagy through 9/26. Plan is to discharge to [facility] once medically clear; will need to resubmit authorization. Family updated.      Electronically signed by Kavita Pinto RN on 9/24/2023 at 6:21 PM

## 2023-09-24 NOTE — PLAN OF CARE
Problem: Discharge Planning  Goal: Discharge to home or other facility with appropriate resources  9/24/2023 1653 by Sterling Joshua RN  Outcome: Progressing     Problem: Safety - Adult  Goal: Free from fall injury  9/24/2023 1653 by Sterling Joshua RN  Outcome: Progressing     Problem: Pain  Goal: Verbalizes/displays adequate comfort level or baseline comfort level  9/24/2023 1653 by Sterling Joshua RN  Outcome: Progressing     Problem: Chronic Conditions and Co-morbidities  Goal: Patient's chronic conditions and co-morbidity symptoms are monitored and maintained or improved  9/24/2023 1653 by Sterling Joshua RN  Outcome: Progressing

## 2023-09-24 NOTE — PROGRESS NOTES
Nutrition Note    K: 6.3. Ensure High Protein removed from breakfast tray before it was served and order to be discontinued. Add Low Potassium to diet order. Note insulin and D10 given. Zohreh Jenkins R.D., L.D.   Phone: [phone]

## 2023-09-24 NOTE — PROGRESS NOTES
81yo female POD#9 s/p duodenal repair. Doing well. Nausea improved, having Bms, tolerating reg diet. Continues to have hyperkalemia. Leukocytosis stable (~13). Hyperkalemia on AM labs (increasing despite repeat hyperK+ treatments). TPN stopped yesterday. She has stable leukocytosis and worsening electrolyte derangements. Will obtain CT abd/pelvis with PO contrast to assess for any intraabdominal process that is leading to slight renal insufficiency. I have discussed hyperkalemia and its treatment with the primary team as well as the CT. Will follow up CT.        Froilan Botello MD

## 2023-09-24 NOTE — PROGRESS NOTES
Nutrition Note    Pt states she would like to continue to receive liquid oral nutriton supplements with breakfast and dinner. Nepro is lowest in K and will be provided. Rai Lombardo R.D., L.LOC.   Phone: [phone]

## 2023-09-24 NOTE — PROGRESS NOTES
Rn spoke w/ Dr. Stephanie Mauricio-nephrology new order to increase lokelma to 10g BID an potassium to be rechecked 2 1/2 hrs after insulin an dextrose is given. Also asked for EKG but ordered already by primary.

## 2023-09-25 LAB
ALBUMIN SERPL-MCNC: 2.8 G/DL (ref 3.5–5.2)
ALP SERPL-CCNC: 48 U/L (ref 35–104)
ALT SERPL-CCNC: 13 U/L (ref 5–33)
ANION GAP SERPL CALCULATED.3IONS-SCNC: 9 MMOL/L (ref 9–17)
AST SERPL-CCNC: 24 U/L
BASOPHILS # BLD: 0 K/UL (ref 0–0.2)
BASOPHILS NFR BLD: 0 % (ref 0–2)
BILIRUB DIRECT SERPL-MCNC: 0.1 MG/DL
BILIRUB INDIRECT SERPL-MCNC: 0.1 MG/DL (ref 0–1)
BILIRUB SERPL-MCNC: 0.2 MG/DL (ref 0.3–1.2)
BUN SERPL-MCNC: 35 MG/DL (ref 8–23)
CALCIUM SERPL-MCNC: 9.2 MG/DL (ref 8.6–10.4)
CHLORIDE SERPL-SCNC: 95 MMOL/L (ref 98–107)
CO2 SERPL-SCNC: 24 MMOL/L (ref 20–31)
CORTIS SERPL-MCNC: 16.5 UG/DL (ref 2.5–19.5)
CORTISOL COLLECTION INFO: NORMAL
CREAT SERPL-MCNC: 0.8 MG/DL (ref 0.5–0.9)
DATE, STOOL #1: NORMAL
EOSINOPHIL # BLD: 0.5 K/UL (ref 0–0.4)
EOSINOPHILS RELATIVE PERCENT: 5 % (ref 0–4)
ERYTHROCYTE [DISTWIDTH] IN BLOOD BY AUTOMATED COUNT: 13.7 % (ref 11.5–14.9)
GFR SERPL CREATININE-BSD FRML MDRD: >60 ML/MIN/1.73M2
GLUCOSE BLD-MCNC: 105 MG/DL (ref 65–105)
GLUCOSE BLD-MCNC: 118 MG/DL (ref 65–105)
GLUCOSE BLD-MCNC: 135 MG/DL (ref 65–105)
GLUCOSE BLD-MCNC: 156 MG/DL (ref 65–105)
GLUCOSE SERPL-MCNC: 87 MG/DL (ref 70–99)
HCT VFR BLD AUTO: 23.8 % (ref 36–46)
HEMOCCULT SP1 STL QL: NEGATIVE
HGB BLD-MCNC: 7.8 G/DL (ref 12–16)
IRON SATN MFR SERPL: 11 % (ref 20–55)
IRON SERPL-MCNC: 18 UG/DL (ref 37–145)
LYMPHOCYTES NFR BLD: 1.8 K/UL (ref 1–4.8)
LYMPHOCYTES RELATIVE PERCENT: 17 % (ref 24–44)
MAGNESIUM SERPL-MCNC: 2.1 MG/DL (ref 1.6–2.6)
MCH RBC QN AUTO: 29.8 PG (ref 26–34)
MCHC RBC AUTO-ENTMCNC: 32.6 G/DL (ref 31–37)
MCV RBC AUTO: 91.3 FL (ref 80–100)
MONOCYTES NFR BLD: 1.5 K/UL (ref 0.1–1.3)
MONOCYTES NFR BLD: 14 % (ref 1–7)
NEUTROPHILS NFR BLD: 64 % (ref 36–66)
NEUTS SEG NFR BLD: 6.7 K/UL (ref 1.3–9.1)
PHOSPHATE SERPL-MCNC: 4.3 MG/DL (ref 2.6–4.5)
PLATELET # BLD AUTO: 311 K/UL (ref 150–450)
PMV BLD AUTO: 8 FL (ref 6–12)
POTASSIUM SERPL-SCNC: 4.7 MMOL/L (ref 3.7–5.3)
POTASSIUM SERPL-SCNC: 4.9 MMOL/L (ref 3.7–5.3)
POTASSIUM SERPL-SCNC: 5.2 MMOL/L (ref 3.7–5.3)
PROT SERPL-MCNC: 5.4 G/DL (ref 6.4–8.3)
RBC # BLD AUTO: 2.61 M/UL (ref 4–5.2)
SODIUM SERPL-SCNC: 128 MMOL/L (ref 135–144)
SODIUM SERPL-SCNC: 128 MMOL/L (ref 135–144)
SODIUM SERPL-SCNC: 129 MMOL/L (ref 135–144)
TIBC SERPL-MCNC: 168 UG/DL (ref 250–450)
TIME, STOOL #1: NORMAL
UNSATURATED IRON BINDING CAPACITY: 150 UG/DL (ref 112–347)
WBC OTHER # BLD: 10.5 K/UL (ref 3.5–11)

## 2023-09-25 PROCEDURE — 6370000000 HC RX 637 (ALT 250 FOR IP): Performed by: INTERNAL MEDICINE

## 2023-09-25 PROCEDURE — 97530 THERAPEUTIC ACTIVITIES: CPT

## 2023-09-25 PROCEDURE — 99232 SBSQ HOSP IP/OBS MODERATE 35: CPT | Performed by: INTERNAL MEDICINE

## 2023-09-25 PROCEDURE — 2580000003 HC RX 258: Performed by: INTERNAL MEDICINE

## 2023-09-25 PROCEDURE — 82270 OCCULT BLOOD FECES: CPT

## 2023-09-25 PROCEDURE — 6360000002 HC RX W HCPCS: Performed by: INTERNAL MEDICINE

## 2023-09-25 PROCEDURE — 99233 SBSQ HOSP IP/OBS HIGH 50: CPT | Performed by: INTERNAL MEDICINE

## 2023-09-25 PROCEDURE — 84295 ASSAY OF SERUM SODIUM: CPT

## 2023-09-25 PROCEDURE — 83735 ASSAY OF MAGNESIUM: CPT

## 2023-09-25 PROCEDURE — 2060000000 HC ICU INTERMEDIATE R&B

## 2023-09-25 PROCEDURE — 82947 ASSAY GLUCOSE BLOOD QUANT: CPT

## 2023-09-25 PROCEDURE — 36415 COLL VENOUS BLD VENIPUNCTURE: CPT

## 2023-09-25 PROCEDURE — 51798 US URINE CAPACITY MEASURE: CPT

## 2023-09-25 PROCEDURE — 83550 IRON BINDING TEST: CPT

## 2023-09-25 PROCEDURE — 82533 TOTAL CORTISOL: CPT

## 2023-09-25 PROCEDURE — 84132 ASSAY OF SERUM POTASSIUM: CPT

## 2023-09-25 PROCEDURE — 83540 ASSAY OF IRON: CPT

## 2023-09-25 PROCEDURE — 6370000000 HC RX 637 (ALT 250 FOR IP): Performed by: NURSE PRACTITIONER

## 2023-09-25 PROCEDURE — 84100 ASSAY OF PHOSPHORUS: CPT

## 2023-09-25 PROCEDURE — 85025 COMPLETE CBC W/AUTO DIFF WBC: CPT

## 2023-09-25 PROCEDURE — 97116 GAIT TRAINING THERAPY: CPT

## 2023-09-25 PROCEDURE — 80076 HEPATIC FUNCTION PANEL: CPT

## 2023-09-25 PROCEDURE — 80048 BASIC METABOLIC PNL TOTAL CA: CPT

## 2023-09-25 PROCEDURE — 6360000002 HC RX W HCPCS: Performed by: NURSE PRACTITIONER

## 2023-09-25 RX ORDER — CARVEDILOL 3.12 MG/1
3.12 TABLET ORAL 2 TIMES DAILY WITH MEALS
Status: DISCONTINUED | OUTPATIENT
Start: 2023-09-25 | End: 2023-09-29

## 2023-09-25 RX ADMIN — PANTOPRAZOLE SODIUM 40 MG: 40 TABLET, DELAYED RELEASE ORAL at 05:44

## 2023-09-25 RX ADMIN — CARVEDILOL 3.12 MG: 3.12 TABLET, FILM COATED ORAL at 17:43

## 2023-09-25 RX ADMIN — ACETAMINOPHEN 650 MG: 325 TABLET ORAL at 08:43

## 2023-09-25 RX ADMIN — METRONIDAZOLE 500 MG: 500 TABLET ORAL at 14:40

## 2023-09-25 RX ADMIN — METRONIDAZOLE 500 MG: 500 TABLET ORAL at 05:44

## 2023-09-25 RX ADMIN — CEFTRIAXONE SODIUM 2000 MG: 2 INJECTION, POWDER, FOR SOLUTION INTRAMUSCULAR; INTRAVENOUS at 20:01

## 2023-09-25 RX ADMIN — TRAMADOL HYDROCHLORIDE 50 MG: 50 TABLET, COATED ORAL at 14:40

## 2023-09-25 RX ADMIN — GABAPENTIN 300 MG: 300 CAPSULE ORAL at 20:04

## 2023-09-25 RX ADMIN — TRAMADOL HYDROCHLORIDE 100 MG: 50 TABLET, COATED ORAL at 21:00

## 2023-09-25 RX ADMIN — CARVEDILOL 6.25 MG: 6.25 TABLET, FILM COATED ORAL at 09:24

## 2023-09-25 RX ADMIN — ACETAMINOPHEN 650 MG: 325 TABLET ORAL at 22:57

## 2023-09-25 RX ADMIN — ENOXAPARIN SODIUM 40 MG: 100 INJECTION SUBCUTANEOUS at 09:24

## 2023-09-25 RX ADMIN — SODIUM CHLORIDE, PRESERVATIVE FREE 10 ML: 5 INJECTION INTRAVENOUS at 14:40

## 2023-09-25 RX ADMIN — TRAMADOL HYDROCHLORIDE 100 MG: 50 TABLET, COATED ORAL at 05:44

## 2023-09-25 RX ADMIN — SODIUM ZIRCONIUM CYCLOSILICATE 10 G: 5 POWDER, FOR SUSPENSION ORAL at 20:02

## 2023-09-25 RX ADMIN — SODIUM ZIRCONIUM CYCLOSILICATE 10 G: 5 POWDER, FOR SUSPENSION ORAL at 09:24

## 2023-09-25 RX ADMIN — SODIUM CHLORIDE, PRESERVATIVE FREE 10 ML: 5 INJECTION INTRAVENOUS at 20:05

## 2023-09-25 RX ADMIN — METRONIDAZOLE 500 MG: 500 TABLET ORAL at 21:01

## 2023-09-25 RX ADMIN — GABAPENTIN 300 MG: 300 CAPSULE ORAL at 09:24

## 2023-09-25 RX ADMIN — PRAVASTATIN SODIUM 40 MG: 40 TABLET ORAL at 20:04

## 2023-09-25 ASSESSMENT — PAIN SCALES - GENERAL
PAINLEVEL_OUTOF10: 7
PAINLEVEL_OUTOF10: 3
PAINLEVEL_OUTOF10: 3
PAINLEVEL_OUTOF10: 6

## 2023-09-25 ASSESSMENT — PAIN DESCRIPTION - LOCATION: LOCATION: ABDOMEN;NECK

## 2023-09-25 ASSESSMENT — PAIN DESCRIPTION - DESCRIPTORS: DESCRIPTORS: ACHING

## 2023-09-25 NOTE — PROGRESS NOTES
PROGRESS NOTE          [id number]  DATE: 9/25/2023    HD: # 13      Patient Active Problem List   Diagnosis    Choledocholithiasis    Benign essential HTN    Diabetes mellitus type 2 in nonobese ([address])    Mixed hyperlipidemia    Acute gastroenteritis    Dilation of biliary tract    Abnormal LFTs    Right upper quadrant abdominal pain    Weight loss    Bacteremia due to Escherichia coli    Cardiac arrhythmia    Calculus of gallbladder with acute cholecystitis without obstruction    Perforated bowel (HCC)    Leukocytosis    Allergy to multiple antibiotics       DIAGNOSIS AND PLAN    POD#10 s/p duodenal repair   -Leukocytosis resolved, WBC 10.5, down from 13.4 yesterday   -Potassium 4.7, down from 5.7 yesterday   -TPN off   -tolerating regular diet without nausea   -RLQ ELENI drain with 185 ml/24 hours, serous fluid    -CT/abd/pelvis 9/24, no acute process/intra-abd abscess    -will follow     E-coli bacteremia on BC at [facility]   -ID following, on ceftriaxone and flagyl, end date 9/26    DM 2   -managed by primary    Dispo: [address] when medically cleared      Chief Complaint: \"hello, I'm good\"    SUBJECTIVE    Patient seen and examined in the chair. She is is very good spirits and feeling better overall. She is tolerating a regular diet, abdominal pain is still present but improved. She denies nausea/vomiting. She is passing flatus and having BM's. Her leukocytosis has improved from 13.4 yesterday to 10.5 today. Potassium also improved from 5.7 yesterday to 4.7 today. She denies nausea/vomiting. OBJECTIVE  VITALS:   Vitals:    09/25/23 0832   BP: (!) 114/56   Pulse:    Resp:    Temp:    SpO2:      Physical Exam  Vitals reviewed. Constitutional:       General: She is not in acute distress. Appearance: Normal appearance. HENT:      Head: Normocephalic and atraumatic. Cardiovascular:      Rate and Rhythm: Normal rate.    Pulmonary:      Effort:

## 2023-09-25 NOTE — PROGRESS NOTES
Dr Mayela Sanders paged regarding babb placement ordered. Patient and family would not like writer to place babb at this time. Awaiting call back.

## 2023-09-25 NOTE — PLAN OF CARE
Problem: Discharge Planning  Goal: Discharge to home or other facility with appropriate resources  9/25/2023 1628 by Jewels Torres RN  Outcome: Progressing     Problem: Safety - Adult  Goal: Free from fall injury  9/25/2023 1628 by Jewesl Torres RN  Outcome: Progressing     Problem: Pain  Goal: Verbalizes/displays adequate comfort level or baseline comfort level  9/25/2023 1628 by Jewels Torres RN  Outcome: Progressing

## 2023-09-25 NOTE — PLAN OF CARE
Problem: Discharge Planning  Goal: Discharge to home or other facility with appropriate resources  9/25/2023 0610 by Ena Fairbanks RN  Outcome: Progressing    Problem: Safety - Adult  Goal: Free from fall injury  9/25/2023 0610 by Ena Fairbanks RN  Outcome: Progressing       Problem: Pain  Goal: Verbalizes/displays adequate comfort level or baseline comfort level  9/25/2023 0610 by Ena Fairbanks RN  Outcome: Progressing       Problem: Chronic Conditions and Co-morbidities  Goal: Patient's chronic conditions and co-morbidity symptoms are monitored and maintained or improved  9/25/2023 0610 by Ena Fairbanks RN  Outcome: Progressing       Problem: Skin/Tissue Integrity  Goal: Absence of new skin breakdown  Description: 1. Monitor for areas of redness and/or skin breakdown  2. Assess vascular access sites hourly  3. Every 4-6 hours minimum:  Change oxygen saturation probe site  4. Every 4-6 hours:  If on nasal continuous positive airway pressure, respiratory therapy assess nares and determine need for appliance change or resting period.   Outcome: Progressing     Problem: ABCDS Injury Assessment  Goal: Absence of physical injury  Outcome: Progressing     Problem: Nutrition Deficit:  Goal: Optimize nutritional status  Outcome: Progressing

## 2023-09-25 NOTE — CARE COORDINATION
Writer placed call to pt daughter Leonela Novoa to update on authorization status with [phone]. No further questions at this time.   Electronically signed by MCIKY Scott on 9/25/2023 at 3:59 PM

## 2023-09-25 NOTE — PROGRESS NOTES
09/23/2023    Cardiac arrhythmia [I49.9] 09/16/2023    Calculus of gallbladder with acute cholecystitis without obstruction [K80.00] 09/16/2023    Perforated bowel ([address]) [K63.1] 09/16/2023    Bacteremia due to Escherichia coli [R78.81, B96.20] 09/13/2023    Choledocholithiasis [K80.50] 09/12/2023    Benign essential HTN [I10] 09/12/2023    Diabetes mellitus type 2 in nonobese ([address]) [E11.9] 09/12/2023    Mixed hyperlipidemia [E78.2] 09/12/2023    Acute gastroenteritis [K52.9] 09/12/2023    Dilation of biliary tract [K83.8] 09/12/2023    Abnormal LFTs [R79.89] 09/12/2023    Right upper quadrant abdominal pain [R10.11] 09/12/2023    Weight loss [R63.4] 09/12/2023           DVT prophylaxis: Mechanical only  Antibiotics: Cefepime and Flagyl    Plan:        82-year-old female initially admitted with acute abdominal pain likely due to choledocholithiasis, 1.4 cm CBD dilatation, elevated LFT, ultrasound no cholecystitis, MRCP unremarkable    Also treated for gastroenteritis-Rocephin and Flagyl started  Type 2 diabetes  HTN, HLD  Iron deficiency anemia with very low iron  Noted to have E. coli bacteremia ID was consulted likely biliary source    On 9/14 she developed chest pain, SVT, cardiology was consulted  EUS on 9/15-concern for duodenal perforation General surgery consulted, perforation confirmed on CT abdomen and emergent laparotomy was done on 9/15    9/16  Patient is on 3 L nasal cannula oxygen  She is awake alert oriented cooperative with exam  Blood pressure on the high side-as needed hydralazine added  Remains n.p.o., LFTs have downtrended since admission  Urine output has been good  Blood sugar controlled  Hemoglobin overall stable  Patient currently on cefepime and Flagyl via PICC line all oral medications have been held  Plan is for n.p.o. until Monday probably repeat EGD after  9/18.    Awaiting upper GI studies  NG will be removed after that  On IV Rocephin  Family has a concern that patient will not able to losartan  EKG did not show any changes  Cardiology on board as well  Consult nephrology, give insulin dextrose Lokelma calcium  EKG reviewed  Continues to have leukocytosis, had low-grade temp 100 yesterday evening, ID on board, abdominal CT ordered  Will repeat blood cultures as well  On Rocephin and Flagyl  Diet has been advanced  Did have bowel movement yesterday  Has been off TPN  Hemoglobin 8.1, continue to monitor.     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 iron  Abdominal CT yesterday did not show any abscess  Megan Tse MD  9/25/2023  1:41 PM

## 2023-09-25 NOTE — PROGRESS NOTES
Physical Therapy  Facility/Department: [facility] PROGRESSIVE CARE  Physical Therapy     NAME: Greer Fuentes  : 1940 (82 y.o.)  MRN: [id number]  CODE STATUS: Full Code    Date of Service: 23      Past Medical History:   Diagnosis Date    Arthritis     Cancer ([facility])     Chronic back pain     Diabetes mellitus ([facility])     DM (diabetes mellitus) ([facility])     GERD (gastroesophageal reflux disease)     Hypertension     Spinal stenosis     Spondylolysis      Past Surgical History:   Procedure Laterality Date    BACK SURGERY      BREAST SURGERY      JOINT REPLACEMENT      LAPAROTOMY N/A 9/15/2023    LAPAROTOMY EXPLORATORY Drain placement performed by Krystal Ruiz MD at  [address]  9/15/2023    EGD ESOPHAGOGASTRODUODENOSCOPY performed by Krystal Ruiz MD at [facility] N/A 9/15/2023    EGD BIOPSY EUS ULTRASOUND LINEAR performed by India Jj MD at [facility]       Patient assessed for rehabilitation services?: Yes  Family / Caregiver Present: Yes (dtr)  Referring Practitioner: Dr. Marcelina Barnes  Referral Date : 23  Diagnosis: choledocholithiasis, cardiac arrthymia  Other (Comment): OK per nurse [place] Arlene to proceed w/ PT treatment    Restrictions:  Restrictions/Precautions: Fall Risk  Position Activity Restriction  Other position/activity restrictions: NO BP, IV sticks or venipunctures right UE. (Dr. Jessica Montana pt for BP in RUE due to PICC line in LUE)     SUBJECTIVE  Pain: 7/10 abdominal pain; and neck pain. RN called to see if pt is due for pain medication prior to mobility.       Functional Mobility  Bed mobility  Rolling to Left: Stand by assistance (using HR)  Supine to Sit: Moderate assistance (HOB elevated used HR, writer assisted w/uprighting trunk)  Scooting: Minimal assistance (hips towards EOB, pt pulled on writers hand)  Bed Mobility Comments: increased time required to complete task  Transfers  Sit to Stand: Rosa Payne, LEON, 09/25/23 at 9:23 AM

## 2023-09-25 NOTE — CARE COORDINATION
Writer placed call to [address] admissions to restart authorization. No answer, voicemail left. Electronically signed by MICKY Peraza on 9/25/2023 at 11:03 AM    Writer spoke to admissions with [address]. Will restart auth today.   Electronically signed by MICKY Peraza on 9/25/2023 at 12:57 PM

## 2023-09-26 LAB
ABSOLUTE BANDS #: 0.4 K/UL (ref 0–1)
ANION GAP SERPL CALCULATED.3IONS-SCNC: 7 MMOL/L (ref 9–17)
BANDS: 4 % (ref 0–10)
BASOPHILS # BLD: 0 K/UL (ref 0–0.2)
BASOPHILS NFR BLD: 0 % (ref 0–2)
BUN SERPL-MCNC: 29 MG/DL (ref 8–23)
CALCIUM SERPL-MCNC: 8.9 MG/DL (ref 8.6–10.4)
CHLORIDE SERPL-SCNC: 96 MMOL/L (ref 98–107)
CHLORIDE UR-SCNC: 37 MMOL/L
CO2 SERPL-SCNC: 27 MMOL/L (ref 20–31)
CREAT SERPL-MCNC: 0.6 MG/DL (ref 0.5–0.9)
EKG ATRIAL RATE: 72 BPM
EKG ATRIAL RATE: 77 BPM
EKG P AXIS: 47 DEGREES
EKG P AXIS: 57 DEGREES
EKG P-R INTERVAL: 150 MS
EKG P-R INTERVAL: 160 MS
EKG Q-T INTERVAL: 378 MS
EKG Q-T INTERVAL: 386 MS
EKG QRS DURATION: 66 MS
EKG QRS DURATION: 66 MS
EKG QTC CALCULATION (BAZETT): 422 MS
EKG QTC CALCULATION (BAZETT): 427 MS
EKG R AXIS: -18 DEGREES
EKG R AXIS: -8 DEGREES
EKG T AXIS: 43 DEGREES
EKG T AXIS: 63 DEGREES
EKG VENTRICULAR RATE: 72 BPM
EKG VENTRICULAR RATE: 77 BPM
EOSINOPHIL # BLD: 0.2 K/UL (ref 0–0.4)
EOSINOPHILS RELATIVE PERCENT: 2 % (ref 0–4)
ERYTHROCYTE [DISTWIDTH] IN BLOOD BY AUTOMATED COUNT: 13.6 % (ref 11.5–14.9)
GFR SERPL CREATININE-BSD FRML MDRD: >60 ML/MIN/1.73M2
GLUCOSE BLD-MCNC: 100 MG/DL (ref 65–105)
GLUCOSE BLD-MCNC: 116 MG/DL (ref 65–105)
GLUCOSE BLD-MCNC: 121 MG/DL (ref 65–105)
GLUCOSE BLD-MCNC: 136 MG/DL (ref 65–105)
GLUCOSE SERPL-MCNC: 107 MG/DL (ref 70–99)
HCT VFR BLD AUTO: 20.6 % (ref 36–46)
HCT VFR BLD AUTO: 21.6 % (ref 36–46)
HGB BLD-MCNC: 7 G/DL (ref 12–16)
HGB BLD-MCNC: 7 G/DL (ref 12–16)
LYMPHOCYTES NFR BLD: 1.4 K/UL (ref 1–4.8)
LYMPHOCYTES RELATIVE PERCENT: 14 % (ref 24–44)
MAGNESIUM SERPL-MCNC: 2 MG/DL (ref 1.6–2.6)
MCH RBC QN AUTO: 30.9 PG (ref 26–34)
MCHC RBC AUTO-ENTMCNC: 34 G/DL (ref 31–37)
MCV RBC AUTO: 91.1 FL (ref 80–100)
METAMYELOCYTES ABSOLUTE COUNT: 0.1 K/UL
METAMYELOCYTES: 1 %
MONOCYTES NFR BLD: 1.2 K/UL (ref 0.1–1.3)
MONOCYTES NFR BLD: 12 % (ref 1–7)
MORPHOLOGY: NORMAL
NEUTROPHILS NFR BLD: 67 % (ref 36–66)
NEUTS SEG NFR BLD: 6.7 K/UL (ref 1.3–9.1)
PHOSPHATE SERPL-MCNC: 3.3 MG/DL (ref 2.6–4.5)
PLATELET # BLD AUTO: 270 K/UL (ref 150–450)
PMV BLD AUTO: 9.5 FL (ref 6–12)
POTASSIUM SERPL-SCNC: 3.9 MMOL/L (ref 3.7–5.3)
POTASSIUM, UR: 22.1 MMOL/L
PREALB SERPL-MCNC: 16.2 MG/DL (ref 20–40)
RBC # BLD AUTO: 2.26 M/UL (ref 4–5.2)
SODIUM SERPL-SCNC: 127 MMOL/L (ref 135–144)
SODIUM SERPL-SCNC: 130 MMOL/L (ref 135–144)
SODIUM SERPL-SCNC: 131 MMOL/L (ref 135–144)
SODIUM UR-SCNC: 57 MMOL/L
WBC OTHER # BLD: 10 K/UL (ref 3.5–11)

## 2023-09-26 PROCEDURE — 6370000000 HC RX 637 (ALT 250 FOR IP): Performed by: INTERNAL MEDICINE

## 2023-09-26 PROCEDURE — 84100 ASSAY OF PHOSPHORUS: CPT

## 2023-09-26 PROCEDURE — 82436 ASSAY OF URINE CHLORIDE: CPT

## 2023-09-26 PROCEDURE — 6360000002 HC RX W HCPCS: Performed by: INTERNAL MEDICINE

## 2023-09-26 PROCEDURE — 2580000003 HC RX 258: Performed by: INTERNAL MEDICINE

## 2023-09-26 PROCEDURE — 85018 HEMOGLOBIN: CPT

## 2023-09-26 PROCEDURE — 82947 ASSAY GLUCOSE BLOOD QUANT: CPT

## 2023-09-26 PROCEDURE — 85025 COMPLETE CBC W/AUTO DIFF WBC: CPT

## 2023-09-26 PROCEDURE — 97530 THERAPEUTIC ACTIVITIES: CPT

## 2023-09-26 PROCEDURE — 84300 ASSAY OF URINE SODIUM: CPT

## 2023-09-26 PROCEDURE — 84134 ASSAY OF PREALBUMIN: CPT

## 2023-09-26 PROCEDURE — 85014 HEMATOCRIT: CPT

## 2023-09-26 PROCEDURE — 99232 SBSQ HOSP IP/OBS MODERATE 35: CPT | Performed by: INTERNAL MEDICINE

## 2023-09-26 PROCEDURE — 83735 ASSAY OF MAGNESIUM: CPT

## 2023-09-26 PROCEDURE — 6370000000 HC RX 637 (ALT 250 FOR IP): Performed by: NURSE PRACTITIONER

## 2023-09-26 PROCEDURE — 84295 ASSAY OF SERUM SODIUM: CPT

## 2023-09-26 PROCEDURE — 99233 SBSQ HOSP IP/OBS HIGH 50: CPT | Performed by: INTERNAL MEDICINE

## 2023-09-26 PROCEDURE — 80048 BASIC METABOLIC PNL TOTAL CA: CPT

## 2023-09-26 PROCEDURE — 36415 COLL VENOUS BLD VENIPUNCTURE: CPT

## 2023-09-26 PROCEDURE — 2060000000 HC ICU INTERMEDIATE R&B

## 2023-09-26 PROCEDURE — 99231 SBSQ HOSP IP/OBS SF/LOW 25: CPT | Performed by: NURSE PRACTITIONER

## 2023-09-26 PROCEDURE — 97116 GAIT TRAINING THERAPY: CPT

## 2023-09-26 PROCEDURE — 84133 ASSAY OF URINE POTASSIUM: CPT

## 2023-09-26 PROCEDURE — 6360000002 HC RX W HCPCS: Performed by: NURSE PRACTITIONER

## 2023-09-26 RX ADMIN — TRAMADOL HYDROCHLORIDE 100 MG: 50 TABLET, COATED ORAL at 03:22

## 2023-09-26 RX ADMIN — GABAPENTIN 300 MG: 300 CAPSULE ORAL at 09:39

## 2023-09-26 RX ADMIN — TRAMADOL HYDROCHLORIDE 100 MG: 50 TABLET, COATED ORAL at 12:47

## 2023-09-26 RX ADMIN — CEFTRIAXONE SODIUM 2000 MG: 2 INJECTION, POWDER, FOR SOLUTION INTRAMUSCULAR; INTRAVENOUS at 20:27

## 2023-09-26 RX ADMIN — METRONIDAZOLE 500 MG: 500 TABLET ORAL at 21:43

## 2023-09-26 RX ADMIN — CARVEDILOL 3.12 MG: 3.12 TABLET, FILM COATED ORAL at 09:39

## 2023-09-26 RX ADMIN — IRON SUCROSE 200 MG: 20 INJECTION, SOLUTION INTRAVENOUS at 17:18

## 2023-09-26 RX ADMIN — SALINE NASAL SPRAY 1 SPRAY: 1.5 SOLUTION NASAL at 16:32

## 2023-09-26 RX ADMIN — ENOXAPARIN SODIUM 40 MG: 100 INJECTION SUBCUTANEOUS at 09:37

## 2023-09-26 RX ADMIN — METRONIDAZOLE 500 MG: 500 TABLET ORAL at 16:32

## 2023-09-26 RX ADMIN — SODIUM CHLORIDE, PRESERVATIVE FREE 10 ML: 5 INJECTION INTRAVENOUS at 20:28

## 2023-09-26 RX ADMIN — CARVEDILOL 3.12 MG: 3.12 TABLET, FILM COATED ORAL at 18:37

## 2023-09-26 RX ADMIN — SODIUM CHLORIDE, PRESERVATIVE FREE 10 ML: 5 INJECTION INTRAVENOUS at 09:46

## 2023-09-26 RX ADMIN — ALTEPLASE 1 MG: 2.2 INJECTION, POWDER, LYOPHILIZED, FOR SOLUTION INTRAVENOUS at 16:32

## 2023-09-26 RX ADMIN — ALTEPLASE 1 MG: 2.2 INJECTION, POWDER, LYOPHILIZED, FOR SOLUTION INTRAVENOUS at 16:34

## 2023-09-26 RX ADMIN — METRONIDAZOLE 500 MG: 500 TABLET ORAL at 05:25

## 2023-09-26 RX ADMIN — PRAVASTATIN SODIUM 40 MG: 40 TABLET ORAL at 20:28

## 2023-09-26 RX ADMIN — GABAPENTIN 300 MG: 300 CAPSULE ORAL at 20:28

## 2023-09-26 RX ADMIN — PANTOPRAZOLE SODIUM 40 MG: 40 TABLET, DELAYED RELEASE ORAL at 05:25

## 2023-09-26 ASSESSMENT — PAIN SCALES - GENERAL
PAINLEVEL_OUTOF10: 7
PAINLEVEL_OUTOF10: 0
PAINLEVEL_OUTOF10: 5
PAINLEVEL_OUTOF10: 4
PAINLEVEL_OUTOF10: 8
PAINLEVEL_OUTOF10: 2

## 2023-09-26 ASSESSMENT — PAIN DESCRIPTION - ORIENTATION: ORIENTATION: LEFT

## 2023-09-26 ASSESSMENT — PAIN DESCRIPTION - DESCRIPTORS: DESCRIPTORS: ACHING

## 2023-09-26 ASSESSMENT — PAIN DESCRIPTION - LOCATION
LOCATION: BACK
LOCATION: NECK;BACK

## 2023-09-26 ASSESSMENT — PAIN DESCRIPTION - FREQUENCY: FREQUENCY: CONTINUOUS

## 2023-09-26 NOTE — PLAN OF CARE
Problem: Discharge Planning  Goal: Discharge to home or other facility with appropriate resources  9/26/2023 0410 by Keara Samano RN  Outcome: Progressing       Problem: Safety - Adult  Goal: Free from fall injury  9/26/2023 0410 by Keara Samano RN  Outcome: Progressing    Problem: Pain  Goal: Verbalizes/displays adequate comfort level or baseline comfort level  9/26/2023 0410 by Keara Samano RN  Outcome: Progressing       Problem: Chronic Conditions and Co-morbidities  Goal: Patient's chronic conditions and co-morbidity symptoms are monitored and maintained or improved  9/26/2023 0410 by Keara Samano RN  Outcome: Progressing    Problem: Skin/Tissue Integrity  Goal: Absence of new skin breakdown  Description: 1. Monitor for areas of redness and/or skin breakdown  2. Assess vascular access sites hourly  3. Every 4-6 hours minimum:  Change oxygen saturation probe site  4. Every 4-6 hours:  If on nasal continuous positive airway pressure, respiratory therapy assess nares and determine need for appliance change or resting period.   Outcome: Progressing     Problem: ABCDS Injury Assessment  Goal: Absence of physical injury  Outcome: Progressing     Problem: Nutrition Deficit:  Goal: Optimize nutritional status  Outcome: Progressing

## 2023-09-26 NOTE — PROGRESS NOTES
RLQ ELENI drain removed, steri strips applied, covered with drain sponge and paper tape. Patient tolerated well.

## 2023-09-26 NOTE — PROGRESS NOTES
Physical Therapy  Facility/Department: [facility] PROGRESSIVE CARE  Daily Treatment Note  NAME: Collins Pak  : 1940  MRN: [id number]    Date of Service: 2023    Discharge Recommendations:  Patient would benefit from continued therapy after discharge, Therapy recommended at discharge        Patient Diagnosis(es): The primary encounter diagnosis was Cardiac arrhythmia, unspecified cardiac arrhythmia type. Diagnoses of Calculus of gallbladder with acute cholecystitis without obstruction and Perforated bowel ([address]) were also pertinent to this visit. Plan    Physcial Therapy Plan  General Plan: 5-7 times per week  Specific Instructions for Next Treatment: steps  Current Treatment Recommendations: Strengthening;Gait training;Balance training;Functional mobility training;Stair training; Safety education & training; Therapeutic activities; Patient/Caregiver education & training;Transfer training; Endurance training;Equipment evaluation, education, & procurement     Restrictions  Restrictions/Precautions  Restrictions/Precautions: Fall Risk  Required Braces or Orthoses?: No  Implants present? : Metal implants (right TKR)  Position Activity Restriction  Other position/activity restrictions: NO BP, IV sticks or venipunctures right UE. (Dr. Macarena Vega pt for BP in RUE due to PICC line in LUE)     Subjective    Pt pleasant and agreeable to PT, ELENI drain coming out today, per M.D. Pain: denies pain    Cognition  Overall Cognitive Status: Exceptions  Arousal/Alertness: Delayed responses to stimuli  Following Commands: Follows one step commands with increased time; Follows one step commands with repetition  Attention Span: Attends with cues to redirect  Safety Judgement: Decreased awareness of need for assistance;Decreased awareness of need for safety  Problem Solving: Assistance required to generate solutions;Assistance required to implement solutions;Assistance required to identify errors made;Assistance required to

## 2023-09-26 NOTE — PROGRESS NOTES
Systems:     Denies any shortness of breath or cough  Denies chest pain or palpitations  Appropriate postsurgical abdominal pain, no vomiting  Denies any new numbness tremors or weakness. Medications: Allergies: Allergies   Allergen Reactions    Ace Inhibitors Nausea Only    Mixed Ragweed     Sulfa Antibiotics Nausea Only    Contrast [Iodides] Nausea And Vomiting    Pcn [Penicillins] Rash       Current Meds:   Scheduled Meds:    iron sucrose  200 mg IntraVENous Once    carvedilol  3.125 mg Oral BID WC    pantoprazole  40 mg Oral QAM AC    metroNIDAZOLE  500 mg Oral 3 times per day    insulin lispro  0-8 Units SubCUTAneous TID WC    insulin lispro  0-4 Units SubCUTAneous Nightly    lidocaine  1 patch TransDERmal Daily    scopolamine  1 patch TransDERmal Q72H    cefTRIAXone (ROCEPHIN) IV  2,000 mg IntraVENous Q24H    [Held by provider] sucralfate  1 g Oral 4 times per day    [Held by provider] aspirin  81 mg Oral Daily    gabapentin  300 mg Oral BID    pravastatin  40 mg Oral Daily    sodium chloride flush  5-40 mL IntraVENous 2 times per day    [Held by provider] enoxaparin  40 mg SubCUTAneous Daily     Continuous Infusions:    dextrose      sodium chloride 15 mL/hr at 09/21/23 1948     PRN Meds: glucose, glucagon (rDNA), dextrose, traMADol **OR** traMADol, glucose, dextrose bolus **OR** dextrose bolus, glucagon (rDNA), diatrizoate meglumine-sodium, hydrALAZINE, diatrizoate meglumine-sodium, fentanNYL, sodium chloride flush, sodium chloride, potassium chloride **OR** potassium alternative oral replacement **OR** potassium chloride, magnesium sulfate, ondansetron **OR** ondansetron, acetaminophen **OR** acetaminophen, polyethylene glycol    Data:     Past Medical History:   has a past medical history of Arthritis, Cancer ([address] Chronic back pain, Diabetes mellitus ([address] DM (diabetes mellitus) ([facility] [address] [facility]), GERD (gastroesophageal reflux disease), Hypertension, Spinal stenosis, and Spondylolysis.     Social 99.1, ID on board patient on Rocephin and Flagyl  Continue to monitor for any fevers  DVT prophylaxis Lovenox  We will put p.o. for pain tramadol for      9/24  Hyperkalemic this morning, will DC losartan  EKG did not show any changes  Cardiology on board as well  Consult nephrology, give insulin dextrose Lokelma calcium  EKG reviewed  Continues to have leukocytosis, had low-grade temp 100 yesterday evening, ID on board, abdominal CT ordered  Will repeat blood cultures as well  On Rocephin and Flagyl  Diet has been advanced  Did have bowel movement yesterday  Has been off TPN  Hemoglobin 8.1, continue to monitor. 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 iron  Abdominal CT yesterday did not show any abscess      9/26  Anemia of chronic disease, hemoglobin dropped to 7, stool occult was done which was negative, iron studies reviewed, will give 1 dose of IV iron  Hyponatremia improving, likely SIADH secondary to underlying pain, abdominal CT done did not show any abscess  Hyperkalemia has resolved, Lokelma DC'd  Vitals have been stable  On Rocephin and Flagyl as per ID  Blood pressure has been stable dose of Coreg was decreased yesterday, losartan discontinued  Hold onto Lovenox for DVT prophylaxis since patient has dropped hemoglobin.   Jalil Fernando MD  9/26/2023  2:40 PM

## 2023-09-26 NOTE — PROGRESS NOTES
[id number] [facility]   INPATIENT OCCUPATIONAL THERAPY  PROGRESS NOTE  Date: 2023  Patient Name: Tamra Gipson       Room: -  MRN: [id number]    : 1940  (82 y.o.)  Gender: female   Referring Practitioner: Abhay Abreu MD  Diagnosis: Choledocholithiasis      Discharge Recommendations:  Further Occupational Therapy is recommended upon facility discharge. OT Equipment Recommendations  Other: TBD    Restrictions/Precautions  Restrictions/Precautions  Restrictions/Precautions: Fall Risk  Required Braces or Orthoses?: No  Implants present? : Metal implants (right TKR)  Position Activity Restriction  Other position/activity restrictions: NO BP, IV sticks or venipunctures right UE. (Dr. Francisco Lechuga pt for BP in RUE due to PICC line in LUE)         Subjective  Subjective  Subjective: \"Okay lets go\" Pt is pleasant and cooperative for therapy. Subjective  Pain: Pt reports pain neck, R hip, R knee and abdomen incision rated at 7/10  Comments: LORRAINE murray tx. Objective  Orientation  Overall Orientation Status: Within Functional Limits  Cognition  Overall Cognitive Status: Exceptions  Arousal/Alertness: Delayed responses to stimuli  Following Commands: Follows one step commands with increased time; Follows one step commands with repetition  Attention Span: Attends with cues to redirect  Safety Judgement: Decreased awareness of need for assistance;Decreased awareness of need for safety  Problem Solving: Assistance required to generate solutions;Assistance required to implement solutions;Assistance required to identify errors made;Assistance required to correct errors made  Insights: Decreased awareness of deficits  Initiation: Requires cues for some  Sequencing: Requires cues for some    Activities of Daily Living  ADL  Feeding: Stand by assistance  Grooming: Stand by assistance  UE Bathing: Minimal assistance  LE Bathing: Maximum assistance  UE Dressing: Minimal assistance  LE

## 2023-09-26 NOTE — PROGRESS NOTES
Date:   9/26/2023  Patient name: Anahi Zuniga  Date of admission:  9/12/2023  3:31 AM  MRN:   [id number]  YOB: 1940  PCP: No primary care provider on file. Reason for Admission: Choledocholithiasis [K80.50]    Cardiology follow-up: Abnormal high-sensitivity troponin, elevated proBNP       Referring physician: Dr Prabhjot Mcconnell  Admission 9/12/23 with abdominal pain, fever and chills abnormal ALT AST, mildly dilated CBD  E. coli bacteremia, likely biliary source  9/15/2023 exploratory laparotomy, repair of duodenal perforation, EGD, drain placement  Gallbladder sludge  Elevated proBNP, borderline abnormal high-sensitivity troponin  Anemia, severe iron deficiency  Hypertension  Hyperlipidemia  Chronic back pain  Severe iron deficiency with anemia  History of breast cancer status post surgery     Investigation work-up     ECG 9/14/2023 -12:30 PM  Sinus rhythm, no ischemic ECG changes     MRI abdomen without contrast  Mild prominence of the common bile duct without intrahepatic biliary dilatation or convincing evidence for choledocholithiasis  Gallbladder ultrasound 9/12/2023  Borderline dilated common bile duct small right pleural effusion    2D echo 9/14/2023  Normal LV size mild LVH normal wall motion ejection fraction more than 55%  Grade 1 LV diastolic dysfunction  No significant valvular abnormality noted  No pericardial effusion  Normal IVC diameter and respiratory variation     History of present illness     82years old female with a past medical history of pretension, hyperlipidemia, breast cancer status postsurgery, gastroesophageal reflux disease sleep presented to [facility] with abdominal pain. Her symptoms started about a week prior to the admission. She felt better after avoiding fatty food. She had a relapse of symptoms again a day before admission that prompted ER visit.   CT abdomen showed 1.4 cm CBD dilation that prompted her transport to [facility] quadrant pain, abnormal ALT AST, urinary tract infection fever and chills  Abnormal CT abdomen dilated CBD  Emergency laparotomy for perforated duodenum first portion 9/15/2023  Episode of lower chest, upper abdomen pain at rest like a heavy pressure lasted half an hour, no ECG changes noted, high-sensitivity troponin 28  Severe iron deficiency anemia  Hypokalemia and hypomagnesemia, improved  PAC and PVC, nonsustained SVT started on IV Lopressor      Patient Active Problem List:     Choledocholithiasis     Benign essential HTN     Diabetes mellitus type 2 in nonobese [facility])     Mixed hyperlipidemia     Acute gastroenteritis     Dilation of biliary tract     Abnormal LFTs     Right upper quadrant abdominal pain     Weight loss     Bacteremia due to Escherichia coli     Cardiac arrhythmia     Calculus of gallbladder with acute cholecystitis without obstruction     Perforated bowel ([address])     Leukocytosis     Allergy to multiple antibiotics      Plan of Treatment:   Medications reviewed  Continue current dose of carvedilol 3.125 mg twice daily  Continue other medication  She needs warm up before physical therapy prevent dizziness  General surgery, internal medicine notes reviewed  Neurology notes reviewed    Electronically signed by Klever Morales MD on 9/26/2023 at 4:17 PM

## 2023-09-26 NOTE — PROGRESS NOTES
recognition program.  While intending to generate adocument that actually reflects the content of the visit, the document can still have some errors including those of syntax and sound a like substitutions which may escape proof reading. It such instances, actual meaningcan be extrapolated by contextual diversion.     Sherry Kerr MD  Office: [phone]  Perfect serve / office [phone]

## 2023-09-26 NOTE — PROGRESS NOTES
Comprehensive Nutrition Assessment    Type and Reason for Visit:  Reassess    Nutrition Recommendations/Plan:   Continue diet as ordered. Malnutrition Assessment:  Malnutrition Status: At risk for malnutrition (Comment) (09/17/23 0949)    Context:  Acute Illness     Findings of the 6 clinical characteristics of malnutrition:  Energy Intake:  50% or less of estimated energy requirements for 5 or more days  Weight Loss:  Unable to assess     Body Fat Loss:  Unable to assess     Muscle Mass Loss:  Unable to assess    Fluid Accumulation:  Mild Extremities   Strength:  Not Performed    Nutrition Assessment:    Pt is tolerating diet with potassium down to 3.9. Nutrition Related Findings:    Trace edema DENISE RICHAJEF (9/22). Labs and meds reviewed. Wound Type: Surgical Incision       Current Nutrition Intake & Therapies:    Average Meal Intake: %  Average Supplements Intake: %  ADULT ORAL NUTRITION SUPPLEMENT; Breakfast, Dinner; Renal Oral Supplement  ADULT DIET; Regular; 4 carb choices (60 gm/meal); Low Potassium (Less than 3000 mg/day); 1500 ml    Anthropometric Measures:  Height: 5' (152.4 cm)  Ideal Body Weight (IBW): 100 lbs (45 kg)    Admission Body Weight: 130 lb (59 kg)  Current Body Weight: 130 lb 15.3 oz (59.4 kg) (9/21),   IBW. Weight Source: Bed Scale  Current BMI (kg/m2): 25.6                          BMI Categories: Overweight (BMI 25.0-29. 9)    Estimated Daily Nutrient Needs:  Energy Requirements Based On: Kcal/kg  Weight Used for Energy Requirements: Admission  Energy (kcal/day): 1475 kcals based on 25 kcals/kg (revised)  Weight Used for Protein Requirements: Admission  Protein (g/day): 1.5g/kg= 85-90 g  Method Used for Fluid Requirements: 1 ml/kcal  Fluid (ml/day): 1475 ml    Nutrition Diagnosis:   Inadequate oral intake related to altered GI function as evidenced by intake 26-50%, intake 51-75% (Previously)    Nutrition Interventions:   Food and/or Nutrient Delivery: Continue Current

## 2023-09-27 LAB
ALBUMIN SERPL-MCNC: 2.6 G/DL (ref 3.5–5.2)
ALP SERPL-CCNC: 43 U/L (ref 35–104)
ALT SERPL-CCNC: 12 U/L (ref 5–33)
ANION GAP SERPL CALCULATED.3IONS-SCNC: 5 MMOL/L (ref 9–17)
AST SERPL-CCNC: 19 U/L
BASOPHILS # BLD: 0 K/UL (ref 0–0.2)
BASOPHILS NFR BLD: 0 % (ref 0–2)
BILIRUB DIRECT SERPL-MCNC: <0.1 MG/DL
BILIRUB INDIRECT SERPL-MCNC: ABNORMAL MG/DL (ref 0–1)
BILIRUB SERPL-MCNC: 0.2 MG/DL (ref 0.3–1.2)
BUN SERPL-MCNC: 18 MG/DL (ref 8–23)
CALCIUM SERPL-MCNC: 9.2 MG/DL (ref 8.6–10.4)
CHLORIDE SERPL-SCNC: 97 MMOL/L (ref 98–107)
CO2 SERPL-SCNC: 28 MMOL/L (ref 20–31)
CREAT SERPL-MCNC: 0.5 MG/DL (ref 0.5–0.9)
EOSINOPHIL # BLD: 0.19 K/UL (ref 0–0.4)
EOSINOPHILS RELATIVE PERCENT: 2 % (ref 0–4)
ERYTHROCYTE [DISTWIDTH] IN BLOOD BY AUTOMATED COUNT: 13.6 % (ref 11.5–14.9)
GFR SERPL CREATININE-BSD FRML MDRD: >60 ML/MIN/1.73M2
GLUCOSE BLD-MCNC: 112 MG/DL (ref 65–105)
GLUCOSE BLD-MCNC: 125 MG/DL (ref 65–105)
GLUCOSE BLD-MCNC: 127 MG/DL (ref 65–105)
GLUCOSE BLD-MCNC: 90 MG/DL (ref 65–105)
GLUCOSE SERPL-MCNC: 109 MG/DL (ref 70–99)
HCT VFR BLD AUTO: 21 % (ref 36–46)
HCT VFR BLD AUTO: 26.7 % (ref 36–46)
HGB BLD-MCNC: 6.9 G/DL (ref 12–16)
HGB BLD-MCNC: 8.7 G/DL (ref 12–16)
LYMPHOCYTES NFR BLD: 1.06 K/UL (ref 1–4.8)
LYMPHOCYTES RELATIVE PERCENT: 11 % (ref 24–44)
MCH RBC QN AUTO: 29.4 PG (ref 26–34)
MCHC RBC AUTO-ENTMCNC: 32.6 G/DL (ref 31–37)
MCV RBC AUTO: 90.3 FL (ref 80–100)
MONOCYTES NFR BLD: 1.25 K/UL (ref 0.1–1.3)
MONOCYTES NFR BLD: 13 % (ref 1–7)
MORPHOLOGY: NORMAL
NEUTROPHILS NFR BLD: 74 % (ref 36–66)
NEUTS SEG NFR BLD: 7.1 K/UL (ref 1.3–9.1)
PLATELET # BLD AUTO: 272 K/UL (ref 150–450)
PMV BLD AUTO: 8.5 FL (ref 6–12)
POTASSIUM SERPL-SCNC: 4.1 MMOL/L (ref 3.7–5.3)
PROT SERPL-MCNC: 5.3 G/DL (ref 6.4–8.3)
RBC # BLD AUTO: 2.33 M/UL (ref 4–5.2)
SODIUM SERPL-SCNC: 130 MMOL/L (ref 135–144)
SODIUM SERPL-SCNC: 132 MMOL/L (ref 135–144)
WBC OTHER # BLD: 9.6 K/UL (ref 3.5–11)

## 2023-09-27 PROCEDURE — 86900 BLOOD TYPING SEROLOGIC ABO: CPT

## 2023-09-27 PROCEDURE — 2580000003 HC RX 258: Performed by: INTERNAL MEDICINE

## 2023-09-27 PROCEDURE — 6370000000 HC RX 637 (ALT 250 FOR IP): Performed by: NURSE PRACTITIONER

## 2023-09-27 PROCEDURE — 6360000002 HC RX W HCPCS: Performed by: INTERNAL MEDICINE

## 2023-09-27 PROCEDURE — 85018 HEMOGLOBIN: CPT

## 2023-09-27 PROCEDURE — 85014 HEMATOCRIT: CPT

## 2023-09-27 PROCEDURE — 86850 RBC ANTIBODY SCREEN: CPT

## 2023-09-27 PROCEDURE — 82947 ASSAY GLUCOSE BLOOD QUANT: CPT

## 2023-09-27 PROCEDURE — 80076 HEPATIC FUNCTION PANEL: CPT

## 2023-09-27 PROCEDURE — 6370000000 HC RX 637 (ALT 250 FOR IP): Performed by: INTERNAL MEDICINE

## 2023-09-27 PROCEDURE — 99233 SBSQ HOSP IP/OBS HIGH 50: CPT | Performed by: INTERNAL MEDICINE

## 2023-09-27 PROCEDURE — 36430 TRANSFUSION BLD/BLD COMPNT: CPT

## 2023-09-27 PROCEDURE — 86901 BLOOD TYPING SEROLOGIC RH(D): CPT

## 2023-09-27 PROCEDURE — 80048 BASIC METABOLIC PNL TOTAL CA: CPT

## 2023-09-27 PROCEDURE — 85025 COMPLETE CBC W/AUTO DIFF WBC: CPT

## 2023-09-27 PROCEDURE — 84295 ASSAY OF SERUM SODIUM: CPT

## 2023-09-27 PROCEDURE — 36415 COLL VENOUS BLD VENIPUNCTURE: CPT

## 2023-09-27 PROCEDURE — P9016 RBC LEUKOCYTES REDUCED: HCPCS

## 2023-09-27 PROCEDURE — 99232 SBSQ HOSP IP/OBS MODERATE 35: CPT | Performed by: INTERNAL MEDICINE

## 2023-09-27 PROCEDURE — 86920 COMPATIBILITY TEST SPIN: CPT

## 2023-09-27 PROCEDURE — 2060000000 HC ICU INTERMEDIATE R&B

## 2023-09-27 RX ORDER — SODIUM CHLORIDE 9 MG/ML
INJECTION, SOLUTION INTRAVENOUS PRN
Status: DISCONTINUED | OUTPATIENT
Start: 2023-09-27 | End: 2023-09-29 | Stop reason: HOSPADM

## 2023-09-27 RX ADMIN — CARVEDILOL 3.12 MG: 3.12 TABLET, FILM COATED ORAL at 10:32

## 2023-09-27 RX ADMIN — GABAPENTIN 300 MG: 300 CAPSULE ORAL at 10:32

## 2023-09-27 RX ADMIN — IRON SUCROSE 200 MG: 20 INJECTION, SOLUTION INTRAVENOUS at 18:24

## 2023-09-27 RX ADMIN — CARVEDILOL 3.12 MG: 3.12 TABLET, FILM COATED ORAL at 18:20

## 2023-09-27 RX ADMIN — METRONIDAZOLE 500 MG: 500 TABLET ORAL at 20:46

## 2023-09-27 RX ADMIN — PANTOPRAZOLE SODIUM 40 MG: 40 TABLET, DELAYED RELEASE ORAL at 06:36

## 2023-09-27 RX ADMIN — METRONIDAZOLE 500 MG: 500 TABLET ORAL at 18:18

## 2023-09-27 RX ADMIN — SODIUM CHLORIDE, PRESERVATIVE FREE 10 ML: 5 INJECTION INTRAVENOUS at 10:33

## 2023-09-27 RX ADMIN — GABAPENTIN 300 MG: 300 CAPSULE ORAL at 20:46

## 2023-09-27 RX ADMIN — METRONIDAZOLE 500 MG: 500 TABLET ORAL at 06:36

## 2023-09-27 RX ADMIN — ONDANSETRON 4 MG: 2 INJECTION INTRAMUSCULAR; INTRAVENOUS at 18:18

## 2023-09-27 RX ADMIN — TRAMADOL HYDROCHLORIDE 100 MG: 50 TABLET, COATED ORAL at 20:54

## 2023-09-27 RX ADMIN — PRAVASTATIN SODIUM 40 MG: 40 TABLET ORAL at 20:46

## 2023-09-27 ASSESSMENT — PAIN DESCRIPTION - LOCATION: LOCATION: ABDOMEN

## 2023-09-27 ASSESSMENT — PAIN SCALES - GENERAL: PAINLEVEL_OUTOF10: 6

## 2023-09-27 NOTE — PLAN OF CARE
Problem: Discharge Planning  Goal: Discharge to home or other facility with appropriate resources  9/27/2023 0431 by Geovanny Lagos RN  Outcome: Progressing  9/26/2023 1719 by Miller Timmons RN  Outcome: Progressing     Problem: Safety - Adult  Goal: Free from fall injury  9/27/2023 0431 by Geovanny Lagos RN  Outcome: Progressing  9/26/2023 1719 by Miller Timmons RN  Outcome: Progressing     Problem: Pain  Goal: Verbalizes/displays adequate comfort level or baseline comfort level  9/27/2023 0431 by Geovanny Lagos RN  Outcome: Progressing  9/26/2023 1719 by Miller Timmons RN  Outcome: Progressing     Problem: Chronic Conditions and Co-morbidities  Goal: Patient's chronic conditions and co-morbidity symptoms are monitored and maintained or improved  9/27/2023 0431 by Geovanny Lagos RN  Outcome: Progressing  9/26/2023 1719 by Miller Timmons RN  Outcome: Progressing     Problem: Skin/Tissue Integrity  Goal: Absence of new skin breakdown  Description: 1. Monitor for areas of redness and/or skin breakdown  2. Assess vascular access sites hourly  3. Every 4-6 hours minimum:  Change oxygen saturation probe site  4. Every 4-6 hours:  If on nasal continuous positive airway pressure, respiratory therapy assess nares and determine need for appliance change or resting period.   9/27/2023 0431 by Geovanny Lagos RN  Outcome: Progressing  9/26/2023 1719 by Miller Timmons RN  Outcome: Progressing     Problem: ABCDS Injury Assessment  Goal: Absence of physical injury  9/27/2023 0431 by Geovanny Lagos RN  Outcome: Progressing  9/26/2023 1719 by Miller Timmons RN  Outcome: Progressing     Problem: Nutrition Deficit:  Goal: Optimize nutritional status  9/27/2023 0431 by Geovanny Lagos RN  Outcome: Progressing  9/26/2023 1719 by Miller Timmons RN  Outcome: Progressing

## 2023-09-27 NOTE — CARE COORDINATION
Writer received call from the [address] [place] [address], peer to peer option available for this pt.    [phone] option #4   Reference # [id number] - must be completed by 1200. Writer placed perfectserve message to Dr. Amanda Loja regarding this. Dr. Amanda Loja agreeable. Writer will place call to [facility] to schedule this for Dr. Amanda Loja. Electronically signed by MICKY Brewster on 9/27/2023 at 8:30 AM    Writer placed call to [facility] peer to peer line. Peer to peer scheduled for 9/27 between [phone].   Electronically signed by MICKY Brewster on 9/27/2023 at 9:43 AM

## 2023-09-27 NOTE — PROGRESS NOTES
[facility] -   Infectious diseases evaluation  admission date 9/12/2023    reason for consultation:   Bacteremia    Impression :   Current:  E. coli bacteremia, likely biliary source  Abdominal pain/elevated liver enzymes and biliary dilatation. Status post*upper GI endoscopy with EUS 9/15/2023  Perforated bowel status post repair of duodenal perforation 9/15/2023  Leukocytosis  Diabetes mellitus  Hypertension  Hyperlipidemia  History of breast cancer  Allergies to Sulfa, PCN. Tolerating Ceftriaxone. Recommendations   Ceftriaxone course completed 9/26/2023   Flagyl through 9/28/2023  Follow CBC and renal function  Supportive care. Infection Control Recommendations   [place] Precautions      Antimicrobial Stewardship Recommendations   Simplification of therapy  Targeted therapy      History of Present Illness:   Initial history:  Jatin Shelby is a 82y.o.-year-old female who was transferred from [facility] 9/12/2023. She presented with right upper quadrant abdominal pain for several days associated with the nausea and vomiting. Symptoms moderate to severe, no alleviating or aggravating factors. CT abdomen pelvis completed at [facility] showed biliary duct dilatation with the common bile duct measuring 1.4 cm. Liver enzymes were elevated with ALT of 130, , WBC 12.2. Blood cultures done at [facility] grew E. coli that was sensitive to cefazolin and ceftriaxone. Gallbladder ultrasound showed gallbladder sludge with dilated common bile duct  GI consulted, MRCP was completed but limited due to motion artifact. She had EUS 8/29/8750, complicated by perforated bowel status post duodenal perforation repair 9/15/2023. Interval changes  9/27/2023   She is afebrile, no new complaints. WBC 9.6  Repeat CT abdomen pelvis showed no abscess, did show urinary retention. LUE midline - site clean.   9/24 FL UGI w small \"BC\" in the last 72 hours. Lab Results   Component Value Date/Time    CREATININE 0.5 09/27/2023 06:23 AM    GLUCOSE 109 09/27/2023 06:23 AM       Detailed results: Thank you for allowing us to participate in the care of this patient. Please call with questions. This note is created with the assistance of a speech recognition program.  While intending to generate adocument that actually reflects the content of the visit, the document can still have some errors including those of syntax and sound a like substitutions which may escape proof reading. It such instances, actual meaningcan be extrapolated by contextual diversion.     Puneet Urena MD  Office: [phone]  Perfect serve / office [phone]

## 2023-09-27 NOTE — PROGRESS NOTES
09/27/23 9655   Encounter Summary   Encounter Overview/Reason  Spiritual/Emotional Needs   Service Provided For: Patient and family together   Referral/Consult From: Rounding   Last Encounter  09/27/23   Complexity of Encounter Low   Spiritual/Emotional needs   Type Spiritual Support   Assessment/Intervention/Outcome   Assessment Calm   Intervention Active listening;Sustaining Presence/Ministry of presence   Outcome Coping

## 2023-09-27 NOTE — PROGRESS NOTES
[address] [facility]   OCCUPATIONAL THERAPY MISSED TREATMENT NOTE   INPATIENT   Date: 23  Patient Name: Dayanara Holland       Room: 4-01  MRN: [id number]   Account #: [de-identified]    : 1940  (82 y.o.)  Gender: female   Referring Practitioner: Santi Hussein MD  Diagnosis: Choledocholithiasis             REASON FOR MISSED TREATMENT:  23    -   Other - per RN Carmina Lepe, pt's Hgb is 6.9, pt able to do what she can tolerate however pt sleeping soundly at this time. OT will check back in PM as time permits.     [id number]         Electronically signed by YINKA Knowles on 23 at 9:18 AM EDT

## 2023-09-27 NOTE — PLAN OF CARE
Problem: Discharge Planning  Goal: Discharge to home or other facility with appropriate resources  9/27/2023 1623 by Emanuel Vega RN  Outcome: Progressing  9/27/2023 0431 by Esteban De La Garza RN  Outcome: Progressing     Problem: Safety - Adult  Goal: Free from fall injury  9/27/2023 1623 by Emanuel Vega RN  Outcome: Progressing  9/27/2023 0431 by Esteban De La Garza RN  Outcome: Progressing     Problem: Pain  Goal: Verbalizes/displays adequate comfort level or baseline comfort level  9/27/2023 1623 by Emanuel Vega RN  Outcome: Progressing  9/27/2023 0431 by Esteban De La Garza RN  Outcome: Progressing     Problem: Chronic Conditions and Co-morbidities  Goal: Patient's chronic conditions and co-morbidity symptoms are monitored and maintained or improved  9/27/2023 1623 by Emanuel Vega RN  Outcome: Progressing  9/27/2023 0431 by Esteban De La Garza RN  Outcome: Progressing     Problem: Skin/Tissue Integrity  Goal: Absence of new skin breakdown  Description: 1. Monitor for areas of redness and/or skin breakdown  2. Assess vascular access sites hourly  3. Every 4-6 hours minimum:  Change oxygen saturation probe site  4. Every 4-6 hours:  If on nasal continuous positive airway pressure, respiratory therapy assess nares and determine need for appliance change or resting period.   9/27/2023 1623 by Emanuel Vega RN  Outcome: Progressing  9/27/2023 0431 by Esteban De La Garza RN  Outcome: Progressing     Problem: ABCDS Injury Assessment  Goal: Absence of physical injury  9/27/2023 1623 by Emanuel Vega RN  Outcome: Progressing  9/27/2023 0431 by Esteban De La Garza RN  Outcome: Progressing     Problem: Nutrition Deficit:  Goal: Optimize nutritional status  9/27/2023 1623 by Emanuel Vega RN  Outcome: Progressing  9/27/2023 0431 by Esteban De La Garza RN  Outcome: Progressing

## 2023-09-27 NOTE — PROGRESS NOTES
[address] [facility] Internal Medicine  [facility] Internal Medicine  Kinjal Renee MD; Hao Blanca MD; Rona Brown MD; MD Ashley Hauser MD; Hanna Rosales MD; Samir Garland MD        Progress Note    9/27/2023    10:15 AM    Name:   Torrie Oliver  MRN:     [id number]     Acct:      [de-identified]   Room:   2114/2114-01  IP Day:  13  Admit Date:  9/12/2023  3:31 AM    PCP:   No primary care provider on file. Code Status:  Full Code    Subjective:     C/C: No chief complaint on file. Abdominal pain      Interval History Status: Improving    HPI:     Initially admitted for management of abdominal pain, noted to have choledocholithiasis  9/18  Patient, clinically doing almost the same  Complaining of abdominal pain  NG in situ  Had episode of vomiting , during UGI STUDIES   Did not pass stools, flatus  9/20   Patient, clinically doing better  Working with therapy  On IV antibiotic  Passing stools  On TPN    9/21  Patient clinically doing better  Abdominal pain is improving  Nauseous but no vomiting  Tolerating clear liquid diet which is getting advanced  Reglan  On TPN    9/22  Patient seen and examined, was sitting on the chair today, states that she is feeling good, still have some abdominal pain,  Diet advanced today  Did not have any bowel movement today and yesterday, passing gas      9/27  Patient feeling good  Patient having bowel movements, denies any pain, states that she does not want pain medication since pain is controlled    Review of Systems:     Denies any shortness of breath or cough  Denies chest pain or palpitations  Appropriate postsurgical abdominal pain, no vomiting  Denies any new numbness tremors or weakness. Medications: Allergies:     Allergies   Allergen Reactions    Ace Inhibitors Nausea Only    Mixed Ragweed     Sulfa Antibiotics Nausea Only    Contrast [Iodides] Nausea And Vomiting    Pcn [Penicillins] Rash       Current Meds:

## 2023-09-27 NOTE — PROGRESS NOTES
Physical Therapy        Physical Therapy Cancel Note      DATE: 2023    NAME: Johnny Breaux  MRN: [id number]   : 1940      Patient not seen this date for Physical Therapy due to: Other: Per RN, pt has low hgb of 6.9, but ok'd treatment to pts tolerance. Attempted to see pt 3x, at 900- sleeping soundly, 1000 and 1020- pt was unavailable to [facility] staff both times. Will continue to follow and reattempt as time permits.        Electronically signed by See Barraza PTA on 2023 at 10:26 AM

## 2023-09-28 LAB
ANION GAP SERPL CALCULATED.3IONS-SCNC: 5 MMOL/L (ref 9–17)
BASOPHILS # BLD: 0.09 K/UL (ref 0–0.2)
BASOPHILS NFR BLD: 1 % (ref 0–2)
BUN SERPL-MCNC: 20 MG/DL (ref 8–23)
CALCIUM SERPL-MCNC: 9.4 MG/DL (ref 8.6–10.4)
CHLORIDE SERPL-SCNC: 101 MMOL/L (ref 98–107)
CO2 SERPL-SCNC: 28 MMOL/L (ref 20–31)
CREAT SERPL-MCNC: 0.6 MG/DL (ref 0.5–0.9)
EOSINOPHIL # BLD: 0.35 K/UL (ref 0–0.4)
EOSINOPHILS RELATIVE PERCENT: 4 % (ref 0–4)
ERYTHROCYTE [DISTWIDTH] IN BLOOD BY AUTOMATED COUNT: 13.8 % (ref 11.5–14.9)
GFR SERPL CREATININE-BSD FRML MDRD: >60 ML/MIN/1.73M2
GLUCOSE BLD-MCNC: 137 MG/DL (ref 65–105)
GLUCOSE BLD-MCNC: 152 MG/DL (ref 65–105)
GLUCOSE BLD-MCNC: 166 MG/DL (ref 65–105)
GLUCOSE BLD-MCNC: 95 MG/DL (ref 65–105)
GLUCOSE SERPL-MCNC: 94 MG/DL (ref 70–99)
HCT VFR BLD AUTO: 25.9 % (ref 36–46)
HCT VFR BLD AUTO: 26 % (ref 36–46)
HCT VFR BLD AUTO: 26.6 % (ref 36–46)
HGB BLD-MCNC: 8.5 G/DL (ref 12–16)
HGB BLD-MCNC: 8.7 G/DL (ref 12–16)
HGB BLD-MCNC: 8.7 G/DL (ref 12–16)
LYMPHOCYTES NFR BLD: 1.39 K/UL (ref 1–4.8)
LYMPHOCYTES RELATIVE PERCENT: 16 % (ref 24–44)
MCH RBC QN AUTO: 30.4 PG (ref 26–34)
MCHC RBC AUTO-ENTMCNC: 33.4 G/DL (ref 31–37)
MCV RBC AUTO: 90.9 FL (ref 80–100)
MONOCYTES NFR BLD: 1.39 K/UL (ref 0.1–1.3)
MONOCYTES NFR BLD: 16 % (ref 1–7)
MORPHOLOGY: ABNORMAL
NEUTROPHILS NFR BLD: 63 % (ref 36–66)
NEUTS SEG NFR BLD: 5.48 K/UL (ref 1.3–9.1)
PLATELET # BLD AUTO: 305 K/UL (ref 150–450)
PMV BLD AUTO: 8 FL (ref 6–12)
POTASSIUM SERPL-SCNC: 4.6 MMOL/L (ref 3.7–5.3)
RBC # BLD AUTO: 2.86 M/UL (ref 4–5.2)
SODIUM SERPL-SCNC: 134 MMOL/L (ref 135–144)
WBC OTHER # BLD: 8.7 K/UL (ref 3.5–11)

## 2023-09-28 PROCEDURE — 82947 ASSAY GLUCOSE BLOOD QUANT: CPT

## 2023-09-28 PROCEDURE — 2060000000 HC ICU INTERMEDIATE R&B

## 2023-09-28 PROCEDURE — 97110 THERAPEUTIC EXERCISES: CPT

## 2023-09-28 PROCEDURE — 85025 COMPLETE CBC W/AUTO DIFF WBC: CPT

## 2023-09-28 PROCEDURE — 2580000003 HC RX 258: Performed by: INTERNAL MEDICINE

## 2023-09-28 PROCEDURE — 99233 SBSQ HOSP IP/OBS HIGH 50: CPT | Performed by: INTERNAL MEDICINE

## 2023-09-28 PROCEDURE — 6370000000 HC RX 637 (ALT 250 FOR IP): Performed by: INTERNAL MEDICINE

## 2023-09-28 PROCEDURE — 97530 THERAPEUTIC ACTIVITIES: CPT

## 2023-09-28 PROCEDURE — 6370000000 HC RX 637 (ALT 250 FOR IP): Performed by: NURSE PRACTITIONER

## 2023-09-28 PROCEDURE — 99232 SBSQ HOSP IP/OBS MODERATE 35: CPT | Performed by: INTERNAL MEDICINE

## 2023-09-28 PROCEDURE — 85014 HEMATOCRIT: CPT

## 2023-09-28 PROCEDURE — 80048 BASIC METABOLIC PNL TOTAL CA: CPT

## 2023-09-28 PROCEDURE — 97116 GAIT TRAINING THERAPY: CPT

## 2023-09-28 PROCEDURE — 85018 HEMOGLOBIN: CPT

## 2023-09-28 PROCEDURE — 97535 SELF CARE MNGMENT TRAINING: CPT

## 2023-09-28 PROCEDURE — 36415 COLL VENOUS BLD VENIPUNCTURE: CPT

## 2023-09-28 RX ORDER — CARVEDILOL 3.12 MG/1
3.12 TABLET ORAL 2 TIMES DAILY WITH MEALS
Qty: 60 TABLET | Refills: 3 | Status: SHIPPED
Start: 2023-09-29 | End: 2023-09-29 | Stop reason: HOSPADM

## 2023-09-28 RX ADMIN — GABAPENTIN 300 MG: 300 CAPSULE ORAL at 21:31

## 2023-09-28 RX ADMIN — METRONIDAZOLE 500 MG: 500 TABLET ORAL at 06:02

## 2023-09-28 RX ADMIN — PANTOPRAZOLE SODIUM 40 MG: 40 TABLET, DELAYED RELEASE ORAL at 06:02

## 2023-09-28 RX ADMIN — PRAVASTATIN SODIUM 40 MG: 40 TABLET ORAL at 21:31

## 2023-09-28 RX ADMIN — SODIUM CHLORIDE, PRESERVATIVE FREE 10 ML: 5 INJECTION INTRAVENOUS at 21:40

## 2023-09-28 RX ADMIN — CARVEDILOL 3.12 MG: 3.12 TABLET, FILM COATED ORAL at 09:34

## 2023-09-28 RX ADMIN — TRAMADOL HYDROCHLORIDE 50 MG: 50 TABLET, COATED ORAL at 12:05

## 2023-09-28 RX ADMIN — CARVEDILOL 3.12 MG: 3.12 TABLET, FILM COATED ORAL at 17:34

## 2023-09-28 RX ADMIN — SODIUM CHLORIDE, PRESERVATIVE FREE 5 ML: 5 INJECTION INTRAVENOUS at 09:39

## 2023-09-28 RX ADMIN — TRAMADOL HYDROCHLORIDE 100 MG: 50 TABLET, COATED ORAL at 21:31

## 2023-09-28 RX ADMIN — TRAMADOL HYDROCHLORIDE 50 MG: 50 TABLET, COATED ORAL at 06:02

## 2023-09-28 RX ADMIN — GABAPENTIN 300 MG: 300 CAPSULE ORAL at 09:33

## 2023-09-28 ASSESSMENT — PAIN DESCRIPTION - DESCRIPTORS: DESCRIPTORS: ACHING

## 2023-09-28 ASSESSMENT — PAIN DESCRIPTION - LOCATION
LOCATION: ABDOMEN
LOCATION: ABDOMEN

## 2023-09-28 ASSESSMENT — PAIN SCALES - GENERAL
PAINLEVEL_OUTOF10: 8
PAINLEVEL_OUTOF10: 0
PAINLEVEL_OUTOF10: 6
PAINLEVEL_OUTOF10: 6

## 2023-09-28 ASSESSMENT — PAIN DESCRIPTION - ORIENTATION: ORIENTATION: MID

## 2023-09-28 NOTE — PLAN OF CARE
Problem: Discharge Planning  Goal: Discharge to home or other facility with appropriate resources  9/28/2023 1357 by Claritza Delaney RN  Outcome: Progressing  Flowsheets (Taken 9/28/2023 0845)  Discharge to home or other facility with appropriate resources:   Identify barriers to discharge with patient and caregiver   Arrange for needed discharge resources and transportation as appropriate   Identify discharge learning needs (meds, wound care, etc)   Arrange for interpreters to assist at discharge as needed   Refer to discharge planning if patient needs post-hospital services based on physician order or complex needs related to functional status, cognitive ability or social support system  9/28/2023 1357 by Claritza Delaney RN  Outcome: Progressing  Flowsheets (Taken 9/28/2023 0845)  Discharge to home or other facility with appropriate resources:   Identify barriers to discharge with patient and caregiver   Arrange for needed discharge resources and transportation as appropriate   Identify discharge learning needs (meds, wound care, etc)   Arrange for interpreters to assist at discharge as needed   Refer to discharge planning if patient needs post-hospital services based on physician order or complex needs related to functional status, cognitive ability or social support system     Problem: Safety - Adult  Goal: Free from fall injury  9/28/2023 1357 by Claritza Delaney RN  Outcome: Progressing  Flowsheets (Taken 9/28/2023 0845)  Free From Fall Injury:   Instruct family/caregiver on patient safety   Based on caregiver fall risk screen, instruct family/caregiver to ask for assistance with transferring infant if caregiver noted to have fall risk factors  9/28/2023 1357 by Claritza Delaney RN  Outcome: Progressing  Flowsheets (Taken 9/28/2023 0845)  Free From Fall Injury:   Instruct family/caregiver on patient safety   Based on caregiver fall risk screen, instruct family/caregiver to ask for assistance with transferring

## 2023-09-28 NOTE — PROGRESS NOTES
[facility] -   Infectious diseases evaluation  admission date 9/12/2023    reason for consultation:   Bacteremia    Impression :   Current:  E. coli bacteremia, likely biliary source  Abdominal pain/elevated liver enzymes and biliary dilatation. Status post*upper GI endoscopy with EUS 9/15/2023  Perforated bowel status post repair of duodenal perforation 9/15/2023  Leukocytosis  Diabetes mellitus  Hypertension  Hyperlipidemia  History of breast cancer  Allergies to Sulfa, PCN. Tolerating Ceftriaxone. Recommendations   Ceftriaxone course completed 9/26/2023   Discontinue Flagyl  Remove midline if not needed  Follow CBC and renal function  Supportive care. Infection Control Recommendations   [place] Precautions      Antimicrobial Stewardship Recommendations   Simplification of therapy  Targeted therapy      History of Present Illness:   Initial history:  Rajeev Cook is a 82y.o.-year-old female who was transferred from [facility] 9/12/2023. She presented with right upper quadrant abdominal pain for several days associated with the nausea and vomiting. Symptoms moderate to severe, no alleviating or aggravating factors. CT abdomen pelvis completed at [facility] showed biliary duct dilatation with the common bile duct measuring 1.4 cm. Liver enzymes were elevated with ALT of 130, , WBC 12.2. Blood cultures done at [facility] grew E. coli that was sensitive to cefazolin and ceftriaxone. Gallbladder ultrasound showed gallbladder sludge with dilated common bile duct  GI consulted, MRCP was completed but limited due to motion artifact. She had EUS 7/19/7299, complicated by perforated bowel status post duodenal perforation repair 9/15/2023. Interval changes  9/28/2023   She is afebrile more than 24 hours, temperature max was 100 on 9/26/2023, denied abdominal pain, no new complaints.   Hemoglobin was 6.9 yesterday,

## 2023-09-28 NOTE — PROGRESS NOTES
Physical Therapy  Facility/Department: [facility] CARE   Physical Therapy   NAME: Valentina Or  : 1940 (82 y.o.)  MRN: [id number]  CODE STATUS: Full Code    Date of Service: 23      Past Medical History:   Diagnosis Date    Arthritis     Cancer ([address])     Chronic back pain     Diabetes mellitus ([address])     DM (diabetes mellitus) ([address])     GERD (gastroesophageal reflux disease)     Hypertension     Spinal stenosis     Spondylolysis      Past Surgical History:   Procedure Laterality Date    BACK SURGERY      BREAST SURGERY      JOINT REPLACEMENT      LAPAROTOMY N/A 9/15/2023    LAPAROTOMY EXPLORATORY Drain placement performed by Marian Haines MD at  [address]  9/15/2023    EGD ESOPHAGOGASTRODUODENOSCOPY performed by Marian Haines MD at [address] N/A 9/15/2023    EGD BIOPSY EUS ULTRASOUND LINEAR performed by Rm Granado MD at [address]       Patient assessed for rehabilitation services?: Yes  Family / Caregiver Present: Yes (son)  Referring Practitioner: Dr. Sindy Franklin  Referral Date : 23  Diagnosis: choledocholithiasis, cardiac arrthymia  General Comment  Comments: Pt resting in bed upon arrival to room. Agreeable to work with writer. Restrictions:  Restrictions/Precautions: Fall Risk  Position Activity Restriction  Other position/activity restrictions: NO BP, IV sticks or venipunctures right UE. (Dr. Livia Arzate pt for BP in RUE due to PICC line in LUE)     SUBJECTIVE  Pain: 7/10 abdominal pain    Functional Mobility  Bed mobility  Supine to Sit: Moderate assistance (Will Cagey assisted w/uprighting trunk.)  Scooting: Stand by assistance  Bed Mobility Comments: increased time required to complete task. Pt guarded. Sleeps in an adjustable bed at home without HR's.   Transfers  Sit to Stand: Contact guard assistance  Stand to Sit: Contact guard assistance  Stand Pivot Transfers: Contact guard assistance (used wheeled walker)  Balance  Posture: Fair  Sitting - Static: Good  Sitting - Dynamic: Good  Standing - Static: Good;- (used wheeled walker)  Standing - Dynamic: Fair;+ (used wheeled walker)    Environmental Mobility  Ambulation  Surface: Level tile  Device: Rolling Walker  Assistance: Contact guard assistance  Quality of Gait: slow guarded gait, however steady. Gait Deviations: Slow Rosey;Decreased step length  Distance: 5ft x 1, 61ft x1  Comments: C/O dizziness with amb, however tolerable. Pt left sitting up in reciner with (B) LE's elevated on foot stool pad. Pt was set up with breakfast. (BP post amb 148/65, HR 86bpm)  Stairs/Curb  Stairs?: No (has 1 ROMEO w/o rail at home)    PT Exercises  A/AROM Exercises: (B) LE seated ex x 15  Resistive Exercises: Seated lime green t-band x 15  Functional Mobility Circuit Training: toilet transfer x 1  Dynamic Standing Balance Exercises: stood at [employer] for amb and  for briana care. steady, no LOB    ASSESSMENT  Vitals  Pulse: 86  BP: (!) 148/65  BP Location: Right upper arm  MAP (Calculated): 93  O2 Device: None (Room air)    Activity Tolerance  Activity Tolerance: Patient tolerated treatment well    Assessment  Treatment Diagnosis: impaired mobility due to weakness  Therapy Prognosis: Good  Decision Making: Medium Complexity  History: pt admitted due to choldocholithiasis  Exam: ROM, MMT, balance and mobility assessments  Barriers to Learning: none  Discharge Recommendations: Patient would benefit from continued therapy after discharge; Therapy recommended at discharge      GOALS  Patient Goals   Patient Goals : get stronger  Short Term Goals  Time Frame for Short Term Goals: 5-7 treatments/ week  Short Term Goal 1: pt to tolerate 1/2 hour of therapuetic exercise and activity  Short Term Goal 2: pt to demonstrate good technique for exercise program for general strengthening and balance activities  Short Term Goal 3: pt to demonstrate  independent bed mobility

## 2023-09-28 NOTE — PROGRESS NOTES
discharge  Short Term Goal 1: Pt will perform functional transfers/mobility with Mod I, good safety, and us of least restrictive device  Short Term Goal 2: Pt will perform BADLs with Mod I, good safety, and use of AE/DME/Modified techniques as needed  Short Term Goal 3: Pt will tolerate standing for 10+ minutes, Mod I, with 0-1 UE support and no LOB during self-care/functional activity for improved dynamic balance  Short Term Goal 4: Pt will actively participate in 15+ minutes of therapeutic exercise/functional activity to promote safety and independence with self-care  Short Term Goal 5: Pt will verbalize/demonstrate good understanding of home safety/fall prevention strategies to promote safety and independence with self-care  Occupational Therapy Plan  Times Per Week: 4-6  Times Per Day:  Once a day  Current Treatment Recommendations: Self-Care / ADL, Strengthening, ROM, Balance training, Functional mobility training, Endurance training, Pain management, Safety education & training, Patient/Caregiver education & training, Equipment evaluation, education, & procurement, Home management training    Assessment  Activity Tolerance  Activity Tolerance: Patient Tolerated treatment well, Patient limited by fatigue  Assessment  Performance deficits / Impairments: Decreased functional mobility , Decreased ADL status, Decreased ROM, Decreased strength, Decreased safe awareness, Decreased endurance, Decreased balance, Decreased high-level IADLs  Treatment Diagnosis: Impaired self-care status  Prognosis: Good  Decision Making: Medium Complexity  Discharge Recommendations: Patient would benefit from continued therapy after discharge  OT Equipment Recommendations  Other: TBD  Safety Devices  Type of Devices: Left in bed, Call light within reach, All fall risk precautions in place    AM-PAC Daily Activities Inpatient  AM-PAC Daily Activity - Inpatient   How much help is needed for putting on and taking off regular lower body

## 2023-09-28 NOTE — CARE COORDINATION
Patient will discharge to [facility]   [address] [facility]  V:[phone]  F: [phone]   at 1600 on 9/29  via [address] [facility] faxed to facility. Patient/Family informed of discharge and is agreeable. Bedside RN notified, Call report to [phone].

## 2023-09-28 NOTE — PROGRESS NOTES
Date:   9/28/2023  Patient name: Jose Muro  Date of admission:  9/12/2023  3:31 AM  MRN:   [id number]  YOB: 1940  PCP: No primary care provider on file. Reason for Admission: Choledocholithiasis [K80.50]    Cardiology follow-up: Abnormal high-sensitivity troponin, elevated proBNP       Referring physician: Dr Doris Macias  Admission 9/12/23 with abdominal pain, fever and chills abnormal ALT AST, mildly dilated CBD  E. coli bacteremia, likely biliary source  9/15/2023 exploratory laparotomy, repair of duodenal perforation, EGD, drain placement  Gallbladder sludge  Elevated proBNP, borderline abnormal high-sensitivity troponin  Anemia, severe iron deficiency  Hypertension  Hyperlipidemia  Chronic back pain  Severe iron deficiency with anemia  History of breast cancer status post surgery     Investigation work-up     ECG 9/14/2023 -12:30 PM  Sinus rhythm, no ischemic ECG changes     MRI abdomen without contrast  Mild prominence of the common bile duct without intrahepatic biliary dilatation or convincing evidence for choledocholithiasis  Gallbladder ultrasound 9/12/2023  Borderline dilated common bile duct small right pleural effusion    2D echo 9/14/2023  Normal LV size mild LVH normal wall motion ejection fraction more than 55%  Grade 1 LV diastolic dysfunction  No significant valvular abnormality noted  No pericardial effusion  Normal IVC diameter and respiratory variation     History of present illness     82years old female with a past medical history of pretension, hyperlipidemia, breast cancer status postsurgery, gastroesophageal reflux disease sleep presented to [facility] with abdominal pain. Her symptoms started about a week prior to the admission. She felt better after avoiding fatty food. She had a relapse of symptoms again a day before admission that prompted ER visit.   CT abdomen showed 1.4 cm CBD dilation that prompted her transport to [facility].  She has elevated AST and ALT however alkaline phosphatase and bilirubin normal.  Patient also reported having chills and fever with a temperature of 101-102. Further history examination she has been experiencing tiredness and shortness of breath for many weeks. Has no previous history of coronary intervention. Early this morning she had lower chest and upper abdominal discomfort like a very heavy pressure which lasted for about half an hour. No nausea no diaphoresis no palpitation. Lab work on admission  proBNP 5509, high-sensitivity troponin 28  Sodium 130, potassium 5.3, BUN 23, creatinine 1.0, glucose 172  Albumin 3.3, alkaline phosphatase 59, , , bilirubin 1.3  WBC 15.9, hemoglobin 8.6, MCV 90.9, neutrophils 68, platelets 777  Ferritin 238, iron 13, saturation 5, TIBC 237, B12 850, INR 1.      Current evaluation  Patient seen and examined yesterday and today  Son was in the room  She was resting on chair  Continue to experience generalized abdominal pain  He is tolerating oral feeding  He is afebrile hemodynamically stable  Renal function is stable  Blood pressure 124/56 heart rate 76, temperature 98.3, oxygen saturation 93%    Albumin 2.6 glucose 152, potassium 4.6, sodium 134, hemoglobin 8.7      Medications:   Scheduled Meds:   carvedilol  3.125 mg Oral BID WC    pantoprazole  40 mg Oral QAM AC    insulin lispro  0-8 Units SubCUTAneous TID WC    insulin lispro  0-4 Units SubCUTAneous Nightly    lidocaine  1 patch TransDERmal Daily    scopolamine  1 patch TransDERmal Q72H    [Held by provider] sucralfate  1 g Oral 4 times per day    aspirin  81 mg Oral Daily    gabapentin  300 mg Oral BID    pravastatin  40 mg Oral Daily    sodium chloride flush  5-40 mL IntraVENous 2 times per day    [Held by provider] enoxaparin  40 mg SubCUTAneous Daily     Continuous Infusions:   sodium chloride      dextrose      sodium chloride 15 mL/hr at 09/21/23 1948     CBC:   Recent Labs

## 2023-09-28 NOTE — CARE COORDINATION
Writer spoke to Dr. Gerald Salvador regarding peer to peer 9/27. Authorization was granted for 24 hours. Pt is not currently medically ready for discharge at this time.   Electronically signed by MICKY Henry on 9/28/2023 at 9:32 AM No

## 2023-09-28 NOTE — PROGRESS NOTES
[address] [facility] Internal Medicine  [facility] Internal Medicine  Petra Salas MD; Kizzy Yi MD; Caroll Duverney, MD; MD Veronica Lloyd MD; Ramiro Guillaume MD; Esther Boyle MD        Progress Note    9/28/2023    1:40 PM    Name:   Anahi Zuniga  MRN:     [id number]     Acct:      [de-identified]   Room:   2114/2114-01  IP Day:  12  Admit Date:  9/12/2023  3:31 AM    PCP:   No primary care provider on file. Code Status:  Full Code    Subjective:     C/C: No chief complaint on file. Abdominal pain      Interval History Status: Improving    HPI:     Initially admitted for management of abdominal pain, noted to have choledocholithiasis  9/18  Patient, clinically doing almost the same  Complaining of abdominal pain  NG in situ  Had episode of vomiting , during UGI STUDIES   Did not pass stools, flatus  9/20   Patient, clinically doing better  Working with therapy  On IV antibiotic  Passing stools  On TPN    9/21  Patient clinically doing better  Abdominal pain is improving  Nauseous but no vomiting  Tolerating clear liquid diet which is getting advanced  Reglan  On TPN    9/22  Patient seen and examined, was sitting on the chair today, states that she is feeling good, still have some abdominal pain,  Diet advanced today  Did not have any bowel movement today and yesterday, passing gas      9/27  Patient feeling good  Patient having bowel movements, denies any pain, states that she does not want pain medication since pain is controlled    Review of Systems:     Denies any shortness of breath or cough  Denies chest pain or palpitations  Appropriate postsurgical abdominal pain, no vomiting  Denies any new numbness tremors or weakness. Medications: Allergies:     Allergies   Allergen Reactions    Ace Inhibitors Nausea Only    Mixed Ragweed     Sulfa Antibiotics Nausea Only    Contrast [Iodides] Nausea And Vomiting    Pcn [Penicillins] Rash       Current Meds: [I10] 09/12/2023    Diabetes mellitus type 2 in nonobese ([address]) [E11.9] 09/12/2023    Mixed hyperlipidemia [E78.2] 09/12/2023    Acute gastroenteritis [K52.9] 09/12/2023    Dilation of biliary tract [K83.8] 09/12/2023    Abnormal LFTs [R79.89] 09/12/2023    Right upper quadrant abdominal pain [R10.11] 09/12/2023    Weight loss [R63.4] 09/12/2023           DVT prophylaxis: Mechanical only  Antibiotics: Cefepime and Flagyl    Plan:        33-year-old female initially admitted with acute abdominal pain likely due to choledocholithiasis, 1.4 cm CBD dilatation, elevated LFT, ultrasound no cholecystitis, MRCP unremarkable    Also treated for gastroenteritis-Rocephin and Flagyl started  Type 2 diabetes  HTN, HLD  Iron deficiency anemia with very low iron  Noted to have E. coli bacteremia ID was consulted likely biliary source    On 9/14 she developed chest pain, SVT, cardiology was consulted  EUS on 9/15-concern for duodenal perforation General surgery consulted, perforation confirmed on CT abdomen and emergent laparotomy was done on 9/15    9/16  Patient is on 3 L nasal cannula oxygen  She is awake alert oriented cooperative with exam  Blood pressure on the high side-as needed hydralazine added  Remains n.p.o., LFTs have downtrended since admission  Urine output has been good  Blood sugar controlled  Hemoglobin overall stable  Patient currently on cefepime and Flagyl via PICC line all oral medications have been held  Plan is for n.p.o. until Monday probably repeat EGD after  9/18. Awaiting upper GI studies  NG will be removed after that  On IV Rocephin  Family has a concern that patient will not able to go home, will have  talk to the family Will need arrangement to SNF placement  Liver functions are improving  Slightly elevated white count patient is already on antibiotic  Hemoglobin stable  Changing fluid to half-normal saline. Lovenox for DVT prophylaxis once okay with operating surgeon.   Awaiting

## 2023-09-28 NOTE — CARE COORDINATION
Writer is following for potential discharge to [facility]. Writer placed call to Peer to Peer line regarding authorization status. Jeri Croft is showing this pt is approved for four days after peer to peer. Will fax authorization to writer. Writer placed call to Jazmyn Segovia to inform of this and that pt will be discharge ready tomorrow per Dr. Nuria Crabtree.   Electronically signed by MICKY Dinh on 9/28/2023 at 2:22 PM

## 2023-09-29 VITALS
TEMPERATURE: 97.8 F | WEIGHT: 130.95 LBS | HEART RATE: 84 BPM | SYSTOLIC BLOOD PRESSURE: 134 MMHG | DIASTOLIC BLOOD PRESSURE: 78 MMHG | RESPIRATION RATE: 18 BRPM | OXYGEN SATURATION: 95 % | BODY MASS INDEX: 25.71 KG/M2 | HEIGHT: 60 IN

## 2023-09-29 LAB
ABO/RH: NORMAL
ALBUMIN SERPL-MCNC: 2.7 G/DL (ref 3.5–5.2)
ALP SERPL-CCNC: 45 U/L (ref 35–104)
ALT SERPL-CCNC: 12 U/L (ref 5–33)
ANION GAP SERPL CALCULATED.3IONS-SCNC: 7 MMOL/L (ref 9–17)
ANTIBODY SCREEN: NEGATIVE
ARM BAND NUMBER: NORMAL
AST SERPL-CCNC: 18 U/L
BASOPHILS # BLD: 0.1 K/UL (ref 0–0.2)
BASOPHILS NFR BLD: 1 % (ref 0–2)
BILIRUB DIRECT SERPL-MCNC: 0.1 MG/DL
BILIRUB INDIRECT SERPL-MCNC: 0.1 MG/DL (ref 0–1)
BILIRUB SERPL-MCNC: 0.2 MG/DL (ref 0.3–1.2)
BLOOD BANK BLOOD PRODUCT EXPIRATION DATE: NORMAL
BLOOD BANK DISPENSE STATUS: NORMAL
BLOOD BANK ISBT PRODUCT BLOOD TYPE: 6200
BLOOD BANK PRODUCT CODE: NORMAL
BLOOD BANK SAMPLE EXPIRATION: NORMAL
BLOOD BANK UNIT TYPE AND RH: NORMAL
BPU ID: NORMAL
BUN SERPL-MCNC: 23 MG/DL (ref 8–23)
CALCIUM SERPL-MCNC: 9.6 MG/DL (ref 8.6–10.4)
CHLORIDE SERPL-SCNC: 101 MMOL/L (ref 98–107)
CO2 SERPL-SCNC: 27 MMOL/L (ref 20–31)
COMPONENT: NORMAL
CREAT SERPL-MCNC: 0.5 MG/DL (ref 0.5–0.9)
CROSSMATCH RESULT: NORMAL
EOSINOPHIL # BLD: 0.4 K/UL (ref 0–0.4)
EOSINOPHILS RELATIVE PERCENT: 4 % (ref 0–4)
ERYTHROCYTE [DISTWIDTH] IN BLOOD BY AUTOMATED COUNT: 13.7 % (ref 11.5–14.9)
GFR SERPL CREATININE-BSD FRML MDRD: >60 ML/MIN/1.73M2
GLUCOSE BLD-MCNC: 133 MG/DL (ref 65–105)
GLUCOSE BLD-MCNC: 137 MG/DL (ref 65–105)
GLUCOSE BLD-MCNC: 86 MG/DL (ref 65–105)
GLUCOSE SERPL-MCNC: 93 MG/DL (ref 70–99)
HCT VFR BLD AUTO: 27.2 % (ref 36–46)
HGB BLD-MCNC: 8.8 G/DL (ref 12–16)
LYMPHOCYTES NFR BLD: 1.7 K/UL (ref 1–4.8)
LYMPHOCYTES RELATIVE PERCENT: 16 % (ref 24–44)
MCH RBC QN AUTO: 29.9 PG (ref 26–34)
MCHC RBC AUTO-ENTMCNC: 32.4 G/DL (ref 31–37)
MCV RBC AUTO: 92.3 FL (ref 80–100)
MICROORGANISM SPEC CULT: NORMAL
MICROORGANISM SPEC CULT: NORMAL
MONOCYTES NFR BLD: 1.4 K/UL (ref 0.1–1.3)
MONOCYTES NFR BLD: 14 % (ref 1–7)
NEUTROPHILS NFR BLD: 65 % (ref 36–66)
NEUTS SEG NFR BLD: 6.6 K/UL (ref 1.3–9.1)
PLATELET # BLD AUTO: 283 K/UL (ref 150–450)
PMV BLD AUTO: 8.9 FL (ref 6–12)
POTASSIUM SERPL-SCNC: 4.9 MMOL/L (ref 3.7–5.3)
PROT SERPL-MCNC: 5.5 G/DL (ref 6.4–8.3)
RBC # BLD AUTO: 2.95 M/UL (ref 4–5.2)
SERVICE CMNT-IMP: NORMAL
SERVICE CMNT-IMP: NORMAL
SODIUM SERPL-SCNC: 135 MMOL/L (ref 135–144)
SPECIMEN DESCRIPTION: NORMAL
SPECIMEN DESCRIPTION: NORMAL
TRANSFUSION STATUS: NORMAL
UNIT DIVISION: 0
UNIT ISSUE DATE/TIME: NORMAL
WBC OTHER # BLD: 10.1 K/UL (ref 3.5–11)

## 2023-09-29 PROCEDURE — 99232 SBSQ HOSP IP/OBS MODERATE 35: CPT | Performed by: INTERNAL MEDICINE

## 2023-09-29 PROCEDURE — 6370000000 HC RX 637 (ALT 250 FOR IP): Performed by: NURSE PRACTITIONER

## 2023-09-29 PROCEDURE — 85025 COMPLETE CBC W/AUTO DIFF WBC: CPT

## 2023-09-29 PROCEDURE — 80048 BASIC METABOLIC PNL TOTAL CA: CPT

## 2023-09-29 PROCEDURE — 97530 THERAPEUTIC ACTIVITIES: CPT

## 2023-09-29 PROCEDURE — 97116 GAIT TRAINING THERAPY: CPT

## 2023-09-29 PROCEDURE — 6370000000 HC RX 637 (ALT 250 FOR IP): Performed by: INTERNAL MEDICINE

## 2023-09-29 PROCEDURE — 99233 SBSQ HOSP IP/OBS HIGH 50: CPT | Performed by: INTERNAL MEDICINE

## 2023-09-29 PROCEDURE — 82947 ASSAY GLUCOSE BLOOD QUANT: CPT

## 2023-09-29 PROCEDURE — 36415 COLL VENOUS BLD VENIPUNCTURE: CPT

## 2023-09-29 PROCEDURE — 2580000003 HC RX 258: Performed by: INTERNAL MEDICINE

## 2023-09-29 PROCEDURE — 80076 HEPATIC FUNCTION PANEL: CPT

## 2023-09-29 RX ORDER — CARVEDILOL 6.25 MG/1
6.25 TABLET ORAL 2 TIMES DAILY WITH MEALS
Status: DISCONTINUED | OUTPATIENT
Start: 2023-09-29 | End: 2023-09-29 | Stop reason: HOSPADM

## 2023-09-29 RX ORDER — BUMETANIDE 1 MG/1
1 TABLET ORAL DAILY
Status: DISCONTINUED | OUTPATIENT
Start: 2023-09-29 | End: 2023-09-29 | Stop reason: HOSPADM

## 2023-09-29 RX ORDER — CARVEDILOL 6.25 MG/1
6.25 TABLET ORAL 2 TIMES DAILY WITH MEALS
Qty: 60 TABLET | Refills: 1 | Status: SHIPPED | OUTPATIENT
Start: 2023-09-29

## 2023-09-29 RX ORDER — TRAMADOL HYDROCHLORIDE 50 MG/1
50 TABLET ORAL EVERY 6 HOURS PRN
Qty: 20 TABLET | Refills: 0 | Status: SHIPPED | OUTPATIENT
Start: 2023-09-29 | End: 2023-10-04

## 2023-09-29 RX ORDER — CARVEDILOL 3.12 MG/1
6.25 TABLET ORAL 2 TIMES DAILY WITH MEALS
Qty: 60 TABLET | Refills: 1 | Status: CANCELLED | OUTPATIENT
Start: 2023-09-29

## 2023-09-29 RX ORDER — BUMETANIDE 2 MG/1
1 TABLET ORAL DAILY
Qty: 4 TABLET | Refills: 0 | Status: CANCELLED | OUTPATIENT
Start: 2023-09-29 | End: 2023-10-07

## 2023-09-29 RX ORDER — BUMETANIDE 1 MG/1
1 TABLET ORAL DAILY
Qty: 7 TABLET | Refills: 0 | Status: SHIPPED | OUTPATIENT
Start: 2023-09-29 | End: 2023-10-06

## 2023-09-29 RX ORDER — GABAPENTIN 300 MG/1
300 CAPSULE ORAL 2 TIMES DAILY
Qty: 60 CAPSULE | Refills: 0 | Status: SHIPPED | OUTPATIENT
Start: 2023-09-29 | End: 2023-10-29

## 2023-09-29 RX ADMIN — CARVEDILOL 6.25 MG: 6.25 TABLET, FILM COATED ORAL at 17:12

## 2023-09-29 RX ADMIN — TRAMADOL HYDROCHLORIDE 50 MG: 50 TABLET, COATED ORAL at 09:31

## 2023-09-29 RX ADMIN — GABAPENTIN 300 MG: 300 CAPSULE ORAL at 09:31

## 2023-09-29 RX ADMIN — SODIUM CHLORIDE, PRESERVATIVE FREE 10 ML: 5 INJECTION INTRAVENOUS at 09:33

## 2023-09-29 RX ADMIN — SODIUM CHLORIDE, PRESERVATIVE FREE 10 ML: 5 INJECTION INTRAVENOUS at 09:32

## 2023-09-29 RX ADMIN — ASPIRIN 81 MG: 81 TABLET, COATED ORAL at 09:31

## 2023-09-29 RX ADMIN — CARVEDILOL 3.12 MG: 3.12 TABLET, FILM COATED ORAL at 09:31

## 2023-09-29 RX ADMIN — PANTOPRAZOLE SODIUM 40 MG: 40 TABLET, DELAYED RELEASE ORAL at 06:36

## 2023-09-29 RX ADMIN — TRAMADOL HYDROCHLORIDE 50 MG: 50 TABLET, COATED ORAL at 17:12

## 2023-09-29 RX ADMIN — BUMETANIDE 1 MG: 1 TABLET ORAL at 13:09

## 2023-09-29 ASSESSMENT — PAIN SCALES - GENERAL
PAINLEVEL_OUTOF10: 4
PAINLEVEL_OUTOF10: 3
PAINLEVEL_OUTOF10: 5
PAINLEVEL_OUTOF10: 6

## 2023-09-29 ASSESSMENT — PAIN DESCRIPTION - LOCATION
LOCATION: ABDOMEN
LOCATION: ABDOMEN

## 2023-09-29 NOTE — PLAN OF CARE
Problem: Discharge Planning  Goal: Discharge to home or other facility with appropriate resources  9/29/2023 0350 by Olivia Velarde RN  Outcome: Progressing     Problem: Safety - Adult  Goal: Free from fall injury  9/29/2023 0350 by Olivia Velarde RN  Outcome: Progressing     Problem: Pain  Goal: Verbalizes/displays adequate comfort level or baseline comfort level  9/29/2023 0350 by Olivia Velarde RN  Outcome: Progressing     Problem: Chronic Conditions and Co-morbidities  Goal: Patient's chronic conditions and co-morbidity symptoms are monitored and maintained or improved  9/29/2023 0350 by Olivia Velarde RN  Outcome: Progressing     Problem: Skin/Tissue Integrity  Goal: Absence of new skin breakdown  Description: 1. Monitor for areas of redness and/or skin breakdown  2. Assess vascular access sites hourly  3. Every 4-6 hours minimum:  Change oxygen saturation probe site  4. Every 4-6 hours:  If on nasal continuous positive airway pressure, respiratory therapy assess nares and determine need for appliance change or resting period.   9/29/2023 0350 by Olivia Velarde RN  Outcome: Progressing     Problem: ABCDS Injury Assessment  Goal: Absence of physical injury  9/29/2023 0350 by Olivia Velarde RN  Outcome: Progressing     Problem: Nutrition Deficit:  Goal: Optimize nutritional status  9/29/2023 0350 by Olivia Velarde RN  Outcome: Progressing

## 2023-09-29 NOTE — PROGRESS NOTES
Pt up to chair after going to the bathroom. Son at bedside. Pt declined for a person;a/chair alarm to be used. Pt and son educated on safety factors and importance of calling for assist when wanting to get up. Both acknowledged.

## 2023-09-29 NOTE — CARE COORDINATION
HENS complete.   Document ID : [id number]  Electronically signed by MICKY Lr on 9/29/2023 at 11:15 AM

## 2023-09-29 NOTE — PROGRESS NOTES
09/29/23 1515   Encounter Summary   Encounter Overview/Reason  Spiritual/Emotional Needs   Service Provided For: Patient and family together   Referral/Consult From: [address] [place] [address]   Last Encounter  09/29/23   Complexity of Encounter Low   Spiritual/Emotional needs   Type Spiritual Support   Assessment/Intervention/Outcome   Assessment Calm;Coping;Peaceful   Intervention Active listening;Prayer (assurance of)/[place];Sustaining Presence/Ministry of presence   Outcome Comfort;Coping;Engaged in conversation;Expressed Gratitude

## 2023-09-29 NOTE — PROGRESS NOTES
(24Hr): Intake/Output Summary (Last 24 hours) at 9/29/2023 0913  Last data filed at 9/29/2023 0440  Gross per 24 hour   Intake --   Output 300 ml   Net -300 ml         Labs:    Lab Results   Component Value Date    WBC 10.1 09/29/2023    HGB 8.8 (L) 09/29/2023    HCT 27.2 (L) 09/29/2023    MCV 92.3 09/29/2023     09/29/2023     Lab Results   Component Value Date/Time     09/29/2023 04:25 AM    K 4.9 09/29/2023 04:25 AM     09/29/2023 04:25 AM    CO2 27 09/29/2023 04:25 AM    BUN 23 09/29/2023 04:25 AM    CREATININE 0.5 09/29/2023 04:25 AM    GLUCOSE 93 09/29/2023 04:25 AM    CALCIUM 9.6 09/29/2023 04:25 AM          No results found for: \"SPECIAL\"  Lab Results   Component Value Date/Time    CULTURE NO GROWTH 4 DAYS 09/24/2023 12:30 PM    CULTURE NO GROWTH 4 DAYS 09/24/2023 12:30 PM         Radiology:    Recent data reviewed    Physical Examination:        General appearance:  alert, cooperative and no distress  Eyes: Anicteric sclera. Pupils are equally round and reactive to light. Extraocular movements are intact.   Lungs:  clear to auscultation bilaterally, normal effort  Heart:  regular rate and rhythm, no murmur  Abdomen:  soft, abdominal binder and drains present, appropriate postsurgical tenderness, good bowel sounds, NG is out  Extremities:  no edema, redness, tenderness in the calves  Skin:  no gross lesions, rashes, induration  Neuro:  Alert, oriented X 3, no new focal weakness  Assessment:        Primary Problem  Choledocholithiasis    Active Hospital Problems    Diagnosis Date Noted    Leukocytosis [D72.829] 09/23/2023    Allergy to multiple antibiotics [Z88.1] 09/23/2023    Cardiac arrhythmia [I49.9] 09/16/2023    Calculus of gallbladder with acute cholecystitis without obstruction [K80.00] 09/16/2023    Perforated bowel ([address]) [K63.1] 09/16/2023    Bacteremia due to Escherichia coli [R78.81, B96.20] 09/13/2023    Choledocholithiasis [K80.50] 09/12/2023    Benign essential HTN [I10] stable, afebrile and maintaining oxygen saturation on RA. Blood pressure remains slightly on the higher side with systolic 451-4 18Y, continued on Coreg 3.125 twice daily. BMP stable, hyponatremia resolved and potassium levels within range. Liver functions back at baseline. Hemoglobin remained stable above 8, no leukocytosis. Patient is pending discharge to SNF facility probably today, medically stable for discharge. Fabian Sampson MD  Internal medicine resident, PGY-2  9/29/2023  9:13 AM     Attestation and add on       I have discussed the care of Anahi Zuniga , including pertinent history and exam findings,      9/29/23    with the resident. I have seen and examined the patient and the key elements of all parts of the encounter have been performed by me . I agree with the assessment, plan and orders as documented by the resident. Going patient is going to SNF  Coreg dose was increased to 6.25 twice daily  To continue diuretic therapy  Discharge meds reconciled  Going to's skilled nursing facility today     MD RICKEY Shah[address], [address].    Phone [phone]   Fax: [phone]  Answering Service: [phone]

## 2023-09-29 NOTE — PROGRESS NOTES
edema  Neurologic: Mental status: Alert, oriented, thought content appropriate    EKG: normal sinus rhythm. ECHO: reviewed. Ejection fraction: 60% mild LVH, no obvious valvular abnormality, normal wall motions  Stress Test: not obtained. Cardiac Angiography: not obtained.     Assessment / Acute Cardiac Problems:   Patient admitted with right upper quadrant pain, abnormal ALT AST, urinary tract infection fever and chills  Abnormal CT abdomen dilated CBD  Emergency laparotomy for perforated duodenum first portion 9/15/2023  Episode of lower chest, upper abdomen pain at rest like a heavy pressure lasted half an hour, no ECG changes noted, high-sensitivity troponin 28  Severe iron deficiency anemia  Hypokalemia and hypomagnesemia, improved  PAC and PVC, nonsustained SVT started beta-blockers improved    Patient Active Problem List:     Choledocholithiasis     Benign essential HTN     Diabetes mellitus type 2 in nonobese [facility])     Mixed hyperlipidemia     Acute gastroenteritis     Dilation of biliary tract     Abnormal LFTs     Right upper quadrant abdominal pain     Weight loss     Bacteremia due to Escherichia coli     Cardiac arrhythmia     Calculus of gallbladder with acute cholecystitis without obstruction     Perforated bowel ([address])     Leukocytosis     Allergy to multiple antibiotics      Plan of Treatment:   Medications reviewed  Increase carvedilol to 6.25 mg twice a day  Add Bumex 1 mg a day for 1 week  High-protein diet  He is going to be discharged to rehab unit today  They should check BMP and CBC every week  Discussed with the patient's son about low-dose diuretics, her son is a physician      Electronically signed by Klever Morales MD on 9/29/2023 at 9:28 AM

## 2023-09-29 NOTE — CARE COORDINATION
Writer placed call to Maikol Gannon at [facility] ED regarding authorization status. No answer, voicemail left. Electronically signed by MICKY Torres on 9/29/2023 at 9:31 AM    Writer spoke to Maikol Gannon at the [facility]. Facility has received authorization.   Electronically signed by MICKY Torres on 9/29/2023 at 12:21 PM

## 2023-09-29 NOTE — CARE COORDINATION
Writer placed call to pt daughter Manoj Lowry regarding discharge plans. No further questions at this time.   Electronically signed by MICKY Lr on 9/29/2023 at 11:10 AM

## 2023-09-29 NOTE — PROGRESS NOTES
[facility] -   Infectious diseases evaluation  admission date 9/12/2023    reason for consultation:   Bacteremia    Impression :   Current:  E. coli bacteremia, likely biliary source  Abdominal pain/elevated liver enzymes and biliary dilatation. Status post*upper GI endoscopy with EUS 9/15/2023  Perforated bowel status post repair of duodenal perforation 9/15/2023  Leukocytosis  Diabetes mellitus  Hypertension  Hyperlipidemia  History of breast cancer  Allergies to Sulfa, PCN. Tolerating Ceftriaxone. Recommendations   Ceftriaxone course completed 9/26/2023   Flagyl course completed  No objection for discharge from infectious disease point of view      Infection Control Recommendations   [place] Precautions      Antimicrobial Stewardship Recommendations   Simplification of therapy  Targeted therapy      History of Present Illness:   Initial history:  Kieran Masters is a 82y.o.-year-old female who was transferred from [facility] 9/12/2023. She presented with right upper quadrant abdominal pain for several days associated with the nausea and vomiting. Symptoms moderate to severe, no alleviating or aggravating factors. CT abdomen pelvis completed at [facility] showed biliary duct dilatation with the common bile duct measuring 1.4 cm. Liver enzymes were elevated with ALT of 130, , WBC 12.2. Blood cultures done at [facility] grew E. coli that was sensitive to cefazolin and ceftriaxone. Gallbladder ultrasound showed gallbladder sludge with dilated common bile duct  GI consulted, MRCP was completed but limited due to motion artifact. She had EUS 2/81/7043, complicated by perforated bowel status post duodenal perforation repair 9/15/2023.     Interval changes  9/29/2023   She is afebrile, postoperative pain controlled, tolerating oral, had bowel movement, no new events  Repeat CT abdomen pelvis 9/24/2023 showed no use: Not on file    Sexual activity: Not on file   Other Topics Concern    Not on file   Social History Narrative    Not on file     Social Determinants of Health     Financial Resource Strain: Not on file   Food Insecurity: Not on file   Transportation Needs: Not on file   Physical Activity: Not on file   Stress: Not on file   Social Connections: Not on file   Intimate Partner Violence: Not on file   Housing Stability: Not on file       Family History:   History reviewed. No pertinent family history. Medical Decision Making:   I have independently reviewed/ordered the following labs:    CBC with Differential:   Recent Labs     09/28/23  0501 09/28/23  1339 09/28/23  2136 09/29/23  0425   WBC 8.7  --   --  10.1   HGB 8.7*   < > 8.5* 8.8*   HCT 26.0*   < > 25.9* 27.2*     --   --  283   LYMPHOPCT 16*  --   --  16*   MONOPCT 16*  --   --  14*    < > = values in this interval not displayed. BMP:  Recent Labs     09/28/23  0501 09/29/23  0425   * 135   K 4.6 4.9    101   CO2 28 27   BUN 20 23   CREATININE 0.6 0.5       Hepatic Function Panel:   Recent Labs     09/27/23  0623 09/29/23  0425   PROT 5.3* 5.5*   LABALBU 2.6* 2.7*   BILIDIR <0.1 0.1   IBILI Can not be calculated 0.1   BILITOT 0.2* 0.2*   ALKPHOS 43 45   ALT 12 12   AST 19 18       No results for input(s): \"RPR\" in the last 72 hours. No results for input(s): \"HIV\" in the last 72 hours. No results for input(s): \"BC\" in the last 72 hours. Lab Results   Component Value Date/Time    CREATININE 0.5 09/29/2023 04:25 AM    GLUCOSE 93 09/29/2023 04:25 AM       Detailed results: Thank you for allowing us to participate in the care of this patient. Please call with questions.     This note is created with the assistance of a speech recognition program.  While intending to generate adocument that actually reflects the content of the visit, the document can still have some errors including those of syntax and sound a like substitutions

## 2023-09-29 NOTE — PROGRESS NOTES
Physical Therapy  Facility/Department: [facility] PROGRESSIVE CARE  Daily Treatment Note  NAME: Jatin Shelby  : 1940  MRN: [id number]    Date of Service: 2023    Discharge Recommendations:  Patient would benefit from continued therapy after discharge, Therapy recommended at discharge        Patient Diagnosis(es): The primary encounter diagnosis was Cardiac arrhythmia, unspecified cardiac arrhythmia type. Diagnoses of Calculus of gallbladder with acute cholecystitis without obstruction, Perforated bowel ([address]), Allergy to multiple antibiotics, Bacteremia due to Escherichia coli, and Right upper quadrant abdominal pain were also pertinent to this visit. Assessment   Activity Tolerance: Patient tolerated treatment well     Plan    Physcial Therapy Plan  General Plan: 5-7 times per week  Specific Instructions for Next Treatment: steps  Current Treatment Recommendations: Strengthening;Gait training;Balance training;Functional mobility training;Stair training; Safety education & training; Therapeutic activities; Patient/Caregiver education & training;Transfer training; Endurance training;Equipment evaluation, education, & procurement     Restrictions  Restrictions/Precautions  Restrictions/Precautions: Fall Risk  Required Braces or Orthoses?: No  Implants present? : Metal implants  Position Activity Restriction  Other position/activity restrictions: NO BP, IV sticks or venipunctures right UE. Subjective    Subjective  Subjective: Pt sitting up in recliner chair on writers arrival. Pt is pleasant and cooperative with therapy. Pt requested to return to bed at end of treatment  Pain: Reports 5/10 pain in abdomen  Orientation  Overall Orientation Status: Within Functional Limits  Cognition  Overall Cognitive Status: Exceptions  Arousal/Alertness: Delayed responses to stimuli  Following Commands: Follows one step commands with increased time; Follows one step commands with repetition  Attention Span: Attends with cues

## 2023-09-29 NOTE — CARE COORDINATION
IMM letter provided to patient son. Patient son offered four hours to make informed decision regarding appeal process; patient son agreeable to discharge.      Electronically signed by MICKY Ryan on 9/29/2023 at 1:54 PM

## 2023-09-29 NOTE — CARE COORDINATION
Writer received all from [place] transport had emergencies, pt transport re-scheduled for 1900. Writer informed bedside RN and Madie Dean.   Electronically signed by MICKY Coleman on 9/29/2023 at 12:19 PM

## 2023-09-30 NOTE — PROGRESS NOTES
Pt discharged from room via stretcher. Pt's son accompanied transport downstairs with all of patients belongings, planning to follow to facility. Discharge packet handed to transport.

## 2023-10-05 ENCOUNTER — OFFICE VISIT (OUTPATIENT)
Age: 83
End: 2023-10-05

## 2023-10-05 VITALS
BODY MASS INDEX: 25.52 KG/M2 | HEIGHT: 60 IN | HEART RATE: 77 BPM | WEIGHT: 130 LBS | DIASTOLIC BLOOD PRESSURE: 33 MMHG | SYSTOLIC BLOOD PRESSURE: 103 MMHG

## 2023-10-05 DIAGNOSIS — K26.5 DUODENAL ULCER PERFORATION (HCC): Primary | ICD-10-CM

## 2023-10-05 ASSESSMENT — ENCOUNTER SYMPTOMS
RESPIRATORY NEGATIVE: 1
EYES NEGATIVE: 1

## 2023-10-05 NOTE — PROGRESS NOTES
[facility], [address]  [place], [address]  [address] [facility], [address]   [address], [address]  [phone]  [employer]    Clinic Note - Post-op Patient      Patient: Torrie Oliver  MRN: [id number]  YOB: 1940 (82 y.o.)    Date of Office Visit: October 5, 2023     CC: Post op follow up    SUBJECTIVE:  Torrie Oliver is a 82 y.o. female who is seen at the [facility] surgery clinic for post op follow up from post op duodenal ulcer repair 9/16/2023 [facility]) after egd EUS perforation-> immediate extravasation and taken to OR by [facility]. Discharged from hospital 9/29/2023    Eating a regular diet without difficulty. Bowel movements are Normal.  The patient is not having any pain. .     Review of Systems:  Review of Systems   Constitutional: Negative. Eyes: Negative. Respiratory: Negative. Genitourinary: Negative. Neurological: Negative. Physical Exam:    There were no vitals filed for this visit. Physical Exam  HENT:      Head: Atraumatic. Cardiovascular:      Rate and Rhythm: Normal rate and regular rhythm. Abdominal:      Palpations: Abdomen is soft. Musculoskeletal:         General: Normal range of motion. Skin:     General: Skin is warm. Neurological:      General: No focal deficit present. Mental Status: She is alert. Pathology:     Component 9/15/23 1305   Surgical Pathology Report Path Number: [id number]     -- Diagnosis --     Stomach, biopsy:     -Intestinal metaplasia present, negative for dysplasia. -Reactive change and moderate chronic gastritis. -Negative for Helicobacter. Assessment/Plan:  Staples removed  Rtc 1 month        We will order the following:  No orders of the defined types were placed in this encounter. she is instructed to follow up in4 weeks. Discussed plan with patient and all questions answered.     GRACE Ko -

## 2023-10-06 NOTE — DISCHARGE SUMMARY
[facility]  Yuir Overton MD; Samra Bo MD; Pam Holly MD; MD Ronny Adan MD; Taniya Phan MD      [facility]  [address]    Discharge Summary     Patient ID: Zoran Cartwright  :  1940   MRN: [id number]     ACCOUNT:  [de-identified]   Patient's PCP: No primary care provider on file. Admit Date: 2023   Discharge Date: 2023  Length of Stay: 17  Code Status:  Prior  Admitting Physician: Toni Murphy MD  Discharge Physician: Melene Carrel, MD     Active Discharge Diagnoses:     Hospital Problem Lists:  Principal Problem:    Choledocholithiasis  Active Problems:    Benign essential HTN    Diabetes mellitus type 2 in nonobese [facility])    Mixed hyperlipidemia    Acute gastroenteritis    Dilation of biliary tract    Abnormal LFTs    Right upper quadrant abdominal pain    Weight loss    Bacteremia due to Escherichia coli    Cardiac arrhythmia    Calculus of gallbladder with acute cholecystitis without obstruction    Perforated bowel ([address])    Leukocytosis    Allergy to multiple antibiotics  Resolved Problems:    * No resolved hospital problems. *      Admission Condition:  poor     Discharged Condition: fair    Hospital Stay:     Hospital Course:  Zoran Cartwright is a 82 y.o. female who was admitted for the management of   Choledocholithiasis , presented to ER with No chief complaint on file. On admission ;  82-year-old female initially admitted with acute abdominal pain likely due to choledocholithiasis, 1.4 cm CBD dilatation, elevated LFT, ultrasound no cholecystitis, MRCP unremarkable     Also treated for gastroenteritis-Rocephin and Flagyl started  Type 2 diabetes  HTN, HLD  Iron deficiency anemia with very low iron  Noted to have E. coli bacteremia ID was consulted likely biliary source        Hospital course ;    Anemia of chronic disease, hemoglobin dropped to 7, stool

## 2023-12-21 NOTE — PROGRESS NOTES
How Severe Is Your Scar?: moderate Physical Therapy  Facility/Department: [facility] PROGRESSIVE CARE  Daily Treatment Note  NAME: Milind Jaime  : 1940  MRN: [id number]    Date of Service: 2023    Discharge Recommendations:  Patient would benefit from continued therapy after discharge, Therapy recommended at discharge        Patient Diagnosis(es): The primary encounter diagnosis was Cardiac arrhythmia, unspecified cardiac arrhythmia type. Diagnoses of Calculus of gallbladder with acute cholecystitis without obstruction and Perforated bowel ([address]) were also pertinent to this visit. Assessment   Activity Tolerance: Patient tolerated treatment well     Plan    Physcial Therapy Plan  General Plan: 5-7 times per week  Specific Instructions for Next Treatment: advance gait distance using wheeled walker, advance to 1 step and instruct in exercise program  Current Treatment Recommendations: Strengthening;Gait training;Balance training;Functional mobility training;Stair training; Safety education & training; Therapeutic activities; Patient/Caregiver education & training;Transfer training; Endurance training;Equipment evaluation, education, & procurement     Restrictions  Restrictions/Precautions  Restrictions/Precautions: Fall Risk  Required Braces or Orthoses?: No  Implants present? : Metal implants (right TKR)  Position Activity Restriction  Other position/activity restrictions: NO BP, IV sticks or venipunctures right UE. Subjective    Subjective  Subjective: Pt in chair upon arrival; just finishing with OT. Son visiting. Pt agreeable to therapy.   Pain: 10/10 abdominal and back pain  Orientation  Overall Orientation Status: Within Functional Limits  Orientation Level: Oriented to person;Oriented to place;Oriented to time;Disoriented to situation      Objective   Bed Mobility Training  Bed Mobility Training: No (pt in chair upon arrival)  Balance  Sitting:  (in chair with back support)  Standing: With support (pt stood with RW for 6-7 minutes with Is This A New Presentation, Or A Follow-Up?: Scar

## 2024-07-10 NOTE — PROGRESS NOTES
Contact-guard assistance  Gait Training  Gait Training: Yes  Gait  Overall Level of Assistance: Contact-guard assistance  Base of Support: Narrowed  Speed/Rosey: Feliciano; Ganesh Martins decreased (< 100 feet/min)  Step Length: Right shortened;Left shortened  Gait Abnormalities: Antalgic;Decreased step clearance  Distance (ft): 4 Feet  Assistive Device: Gait belt;Walker, rolling (required assist with IV pole)     PT Exercises  A/AROM Exercises: seated BLE ex's x 10 reps  Functional Mobility Circuit Training: STS x4  Static Standing Balance Exercises: Standing in RW ~2mins         [facility] Basic Mobility - Inpatient   How much help is needed turning from your back to your side while in a flat bed without using bedrails?: A Little  How much help is needed moving from lying on your back to sitting on the side of a flat bed without using bedrails?: A Little  How much help is needed moving to and from a bed to a chair?: A Little  How much help is needed standing up from a chair using your arms?: A Little  How much help is needed walking in hospital room?: A Little  How much help is needed climbing 3-5 steps with a railing?: A Lot  AM-PAC Inpatient Mobility Raw Score : 17  AM-PAC Inpatient T-Scale Score : 42.13  Mobility Inpatient CMS 0-100% Score: 50.57  Mobility Inpatient CMS G-Code Modifier : CK      Goals  Short Term Goals  Time Frame for Short Term Goals: 5-7 treatments/ week  Short Term Goal 1: pt to tolerate 1/2 hour of therapuetic exercise and activity  Short Term Goal 2: pt to demonstrate good technique for exercise program for general strengthening and balance activities  Short Term Goal 3: pt to demonstrate  independent bed mobility for position change  Short Term Goal 4: pt to demonstrate transfers from various surfaces w/ SBA x 1 using wheeled walker  Short Term Goal 5: pt to demonstrate gait 50 - 80' w/ SBA x 1 using wheeled walker  Short Term Goal 6: pt to demonstrate good ambulatory balance using wheeled walker  Short Term Goal 7: pt to demonstrate ability to ascend/ descend 5-6\" platform step using AD w/ min x 1  Short Term Goal 8: pt to demonstrate increased LE strength by 1/2 MMG to assist w/ transfers  Patient Goals   Patient Goals : get stronger    Education  Patient Education  Education Given To: Patient; Family  Education Provided: Role of Therapy;Plan of Care; Fall Prevention Strategies; Home Exercise Program;Transfer Training  Education Provided Comments: Elevating B LE for pressure relief.   Education Method: Demonstration;Verbal  Barriers to Learning: None  Education Outcome: Verbalized understanding;Demonstrated understanding    Safety measures utilized: Gait belt, Call light within reach, Sitting in chair, Chair alarm, Nurse notified, and Heels offloaded, and  Daughter present    Therapy Time   Individual Concurrent Group Co-treatment   Time In 1315         Time Out 1353         Minutes [address] [place] [address] Adult

## (undated) DEVICE — DEFENDO AIR WATER SUCTION AND BIOPSY VALVE KIT FOR  OLYMPUS: Brand: DEFENDO AIR/WATER/SUCTION AND BIOPSY VALVE

## (undated) DEVICE — GOWN,SIRUS,POLYRNF,XLN/3XL,18/CS: Brand: MEDLINE

## (undated) DEVICE — GLOVE SURG BIOGEL PI ULTRATCH PF 8

## (undated) DEVICE — PAD,NON-ADHERENT,3X8,STERILE,LF,1/PK: Brand: MEDLINE

## (undated) DEVICE — 1LYRTR 16FR10ML100%SILI UMSNAP: Brand: MEDLINE INDUSTRIES, INC.

## (undated) DEVICE — DRAIN SURG 19FR 100% SIL RADPQ RND CHN FULL FLUT

## (undated) DEVICE — SEALANT TISS 10 CC FIBRIN VISTASEAL

## (undated) DEVICE — BITEBLOCK 54FR W/ DENT RIM BLOX

## (undated) DEVICE — SOLUTION IRRIG 1000ML STRL H2O USP PLAS POUR BTL

## (undated) DEVICE — TUBING, SUCTION, 3/16" X 10', STRAIGHT: Brand: MEDLINE

## (undated) DEVICE — BLANKET WRM W29.9XL79.1IN UP BODY FORC AIR MISTRAL-AIR

## (undated) DEVICE — ERBE NESSY® OMEGA PLATE USA (85+23)CM² , WITH CABLE 3 M: Brand: ERBE

## (undated) DEVICE — DRESSING TRNSPAR W4XL10IN FLM MIC POR SURESITE 123

## (undated) DEVICE — SPONGE DRN W4XL4IN RAYON/POLYESTER 6 PLY NONWOVEN PRECUT 2 PER PK

## (undated) DEVICE — SUTURE VCRL + SZ 3-0 L18IN ABSRB VLT SH-1 L22MM 1/2 CIR VCP772D

## (undated) DEVICE — SINGLE PORT MANIFOLD: Brand: NEPTUNE 2

## (undated) DEVICE — YANKAUER,POOLE TIP,STERILE,50/CS: Brand: MEDLINE

## (undated) DEVICE — ST CHARLES MAJOR ABDOMINAL PK: Brand: MEDLINE INDUSTRIES, INC.

## (undated) DEVICE — DRAPE,MEDI-SLUSH,STERILE: Brand: MEDLINE

## (undated) DEVICE — SYRINGE MED 3ML CLR PLAS STD N CTRL LUERLOCK TIP DISP

## (undated) DEVICE — SOLUTION IRRIG 1000ML 0.9% SOD CHL USP POUR PLAS BTL

## (undated) DEVICE — SYSTEM BX CAP BILI RAP EXCHG CAP LOK DEV COMPATIBLE W/ OLY

## (undated) DEVICE — GAUZE,SPONGE,FLUFF,6"X6.75",STRL,5/TRAY: Brand: MEDLINE

## (undated) DEVICE — Device: Brand: DEFENDO VALVE AND CONNECTOR KIT

## (undated) DEVICE — SEALER ENDOSCP NANO COAT OPN DIV CRV L JAW LIGASURE IMPACT

## (undated) DEVICE — Device: Brand: MEDICAL ACTION INDUSTRIES

## (undated) DEVICE — MERCY HEALTH ST CHARLES: Brand: MEDLINE INDUSTRIES, INC.

## (undated) DEVICE — Device

## (undated) DEVICE — APPLICATOR VISTASEAL DUAL

## (undated) DEVICE — SINGLE USE DISTAL COVER MAJ-2315: Brand: SINGLE USE DISTAL COVER

## (undated) DEVICE — HYPODERMIC SAFETY NEEDLE: Brand: MAGELLAN

## (undated) DEVICE — GLOVE ORANGE PI 7 1/2   MSG9075

## (undated) DEVICE — SPHINCTEROTOME: Brand: DREAMTOME™ RX 44

## (undated) DEVICE — ENDO KIT W/SYRINGE: Brand: MEDLINE INDUSTRIES, INC.

## (undated) DEVICE — SYRINGE MED 10ML LUERLOCK TIP W/O SFTY DISP

## (undated) DEVICE — RESERVOIR,SUCTION,100CC,SILICONE: Brand: MEDLINE

## (undated) DEVICE — SUTURE PERMAHAND SZ 3-0 L18IN NONABSORBABLE BLK L26MM SH C013D

## (undated) DEVICE — SUTURE PDS II SZ 0 L60IN ABSRB VLT L48MM CTX 1/2 CIR Z990G